# Patient Record
Sex: FEMALE | Race: WHITE | NOT HISPANIC OR LATINO | Employment: FULL TIME | URBAN - METROPOLITAN AREA
[De-identification: names, ages, dates, MRNs, and addresses within clinical notes are randomized per-mention and may not be internally consistent; named-entity substitution may affect disease eponyms.]

---

## 2017-02-14 ENCOUNTER — HOSPITAL ENCOUNTER (EMERGENCY)
Facility: CLINIC | Age: 37
Discharge: HOME | End: 2017-02-14
Attending: EMERGENCY MEDICINE | Admitting: EMERGENCY MEDICINE
Payer: COMMERCIAL

## 2017-02-14 ENCOUNTER — APPOINTMENT (OUTPATIENT)
Dept: GENERAL RADIOLOGY | Facility: CLINIC | Age: 37
End: 2017-02-14
Attending: EMERGENCY MEDICINE
Payer: COMMERCIAL

## 2017-02-14 VITALS
TEMPERATURE: 98.6 F | HEART RATE: 100 BPM | OXYGEN SATURATION: 97 % | DIASTOLIC BLOOD PRESSURE: 65 MMHG | RESPIRATION RATE: 18 BRPM | WEIGHT: 200 LBS | BODY MASS INDEX: 32.28 KG/M2 | SYSTOLIC BLOOD PRESSURE: 99 MMHG

## 2017-02-14 DIAGNOSIS — J11.1 INFLUENZA-LIKE ILLNESS: ICD-10-CM

## 2017-02-14 DIAGNOSIS — R05.9 COUGH: ICD-10-CM

## 2017-02-14 LAB
ALBUMIN SERPL-MCNC: 3.9 G/DL (ref 3.4–5)
ALBUMIN UR-MCNC: 30 MG/DL
ALP SERPL-CCNC: 91 U/L (ref 40–150)
ALT SERPL W P-5'-P-CCNC: 30 U/L (ref 0–50)
ANION GAP SERPL CALCULATED.3IONS-SCNC: 8 MMOL/L (ref 3–14)
APPEARANCE UR: ABNORMAL
AST SERPL W P-5'-P-CCNC: 30 U/L (ref 0–45)
BASOPHILS # BLD AUTO: 0 10E9/L (ref 0–0.2)
BASOPHILS NFR BLD AUTO: 0.1 %
BILIRUB SERPL-MCNC: 0.8 MG/DL (ref 0.2–1.3)
BILIRUB UR QL STRIP: NEGATIVE
BUN SERPL-MCNC: 19 MG/DL (ref 7–30)
CALCIUM SERPL-MCNC: 8.6 MG/DL (ref 8.5–10.1)
CHLORIDE SERPL-SCNC: 104 MMOL/L (ref 94–109)
CO2 SERPL-SCNC: 28 MMOL/L (ref 20–32)
COLOR UR AUTO: YELLOW
CREAT SERPL-MCNC: 1.21 MG/DL (ref 0.52–1.04)
DIFFERENTIAL METHOD BLD: ABNORMAL
EOSINOPHIL # BLD AUTO: 0.1 10E9/L (ref 0–0.7)
EOSINOPHIL NFR BLD AUTO: 0.7 %
ERYTHROCYTE [DISTWIDTH] IN BLOOD BY AUTOMATED COUNT: 14.4 % (ref 10–15)
FLUAV+FLUBV AG SPEC QL: NEGATIVE
FLUAV+FLUBV AG SPEC QL: NORMAL
GFR SERPL CREATININE-BSD FRML MDRD: 50 ML/MIN/1.7M2
GLUCOSE SERPL-MCNC: 115 MG/DL (ref 70–99)
GLUCOSE UR STRIP-MCNC: NEGATIVE MG/DL
HCG SERPL QL: NEGATIVE
HCT VFR BLD AUTO: 34.6 % (ref 35–47)
HGB BLD-MCNC: 11.4 G/DL (ref 11.7–15.7)
HGB UR QL STRIP: NEGATIVE
IMM GRANULOCYTES # BLD: 0 10E9/L (ref 0–0.4)
IMM GRANULOCYTES NFR BLD: 0.5 %
KETONES UR STRIP-MCNC: NEGATIVE MG/DL
LEUKOCYTE ESTERASE UR QL STRIP: ABNORMAL
LIPASE SERPL-CCNC: 220 U/L (ref 73–393)
LYMPHOCYTES # BLD AUTO: 0.3 10E9/L (ref 0.8–5.3)
LYMPHOCYTES NFR BLD AUTO: 4.3 %
MCH RBC QN AUTO: 30.2 PG (ref 26.5–33)
MCHC RBC AUTO-ENTMCNC: 32.9 G/DL (ref 31.5–36.5)
MCV RBC AUTO: 92 FL (ref 78–100)
MONOCYTES # BLD AUTO: 0.4 10E9/L (ref 0–1.3)
MONOCYTES NFR BLD AUTO: 4.7 %
MUCOUS THREADS #/AREA URNS LPF: PRESENT /LPF
NEUTROPHILS # BLD AUTO: 6.7 10E9/L (ref 1.6–8.3)
NEUTROPHILS NFR BLD AUTO: 89.7 %
NITRATE UR QL: NEGATIVE
NRBC # BLD AUTO: 0 10*3/UL
NRBC BLD AUTO-RTO: 0 /100
PH UR STRIP: 6 PH (ref 5–7)
PLATELET # BLD AUTO: 154 10E9/L (ref 150–450)
POTASSIUM SERPL-SCNC: 3.8 MMOL/L (ref 3.4–5.3)
PROT SERPL-MCNC: 7.7 G/DL (ref 6.8–8.8)
RBC # BLD AUTO: 3.77 10E12/L (ref 3.8–5.2)
RBC #/AREA URNS AUTO: 1 /HPF (ref 0–2)
SODIUM SERPL-SCNC: 140 MMOL/L (ref 133–144)
SP GR UR STRIP: 1.02 (ref 1–1.03)
SPECIMEN SOURCE: NORMAL
SQUAMOUS #/AREA URNS AUTO: 13 /HPF (ref 0–1)
TRANS CELLS #/AREA URNS HPF: <1 /HPF (ref 0–1)
URN SPEC COLLECT METH UR: ABNORMAL
UROBILINOGEN UR STRIP-MCNC: NORMAL MG/DL (ref 0–2)
WBC # BLD AUTO: 7.4 10E9/L (ref 4–11)
WBC #/AREA URNS AUTO: 6 /HPF (ref 0–2)

## 2017-02-14 PROCEDURE — 71020 XR CHEST 2 VW: CPT

## 2017-02-14 PROCEDURE — 83690 ASSAY OF LIPASE: CPT | Performed by: EMERGENCY MEDICINE

## 2017-02-14 PROCEDURE — 96361 HYDRATE IV INFUSION ADD-ON: CPT

## 2017-02-14 PROCEDURE — 25000128 H RX IP 250 OP 636: Performed by: EMERGENCY MEDICINE

## 2017-02-14 PROCEDURE — 80053 COMPREHEN METABOLIC PANEL: CPT | Performed by: EMERGENCY MEDICINE

## 2017-02-14 PROCEDURE — 25000125 ZZHC RX 250: Performed by: EMERGENCY MEDICINE

## 2017-02-14 PROCEDURE — 87804 INFLUENZA ASSAY W/OPTIC: CPT | Performed by: EMERGENCY MEDICINE

## 2017-02-14 PROCEDURE — 25000132 ZZH RX MED GY IP 250 OP 250 PS 637: Performed by: EMERGENCY MEDICINE

## 2017-02-14 PROCEDURE — 99284 EMERGENCY DEPT VISIT MOD MDM: CPT | Mod: 25

## 2017-02-14 PROCEDURE — 96360 HYDRATION IV INFUSION INIT: CPT

## 2017-02-14 PROCEDURE — 84703 CHORIONIC GONADOTROPIN ASSAY: CPT | Performed by: EMERGENCY MEDICINE

## 2017-02-14 PROCEDURE — 81001 URINALYSIS AUTO W/SCOPE: CPT | Performed by: EMERGENCY MEDICINE

## 2017-02-14 PROCEDURE — 85025 COMPLETE CBC W/AUTO DIFF WBC: CPT | Performed by: EMERGENCY MEDICINE

## 2017-02-14 RX ORDER — ONDANSETRON 4 MG/1
4 TABLET, ORALLY DISINTEGRATING ORAL ONCE
Status: COMPLETED | OUTPATIENT
Start: 2017-02-14 | End: 2017-02-14

## 2017-02-14 RX ORDER — ACETAMINOPHEN 500 MG
1000 TABLET ORAL ONCE
Status: COMPLETED | OUTPATIENT
Start: 2017-02-14 | End: 2017-02-14

## 2017-02-14 RX ORDER — IBUPROFEN 600 MG/1
600 TABLET, FILM COATED ORAL ONCE
Status: COMPLETED | OUTPATIENT
Start: 2017-02-14 | End: 2017-02-14

## 2017-02-14 RX ORDER — ONDANSETRON 2 MG/ML
4 INJECTION INTRAMUSCULAR; INTRAVENOUS ONCE
Status: DISCONTINUED | OUTPATIENT
Start: 2017-02-14 | End: 2017-02-14 | Stop reason: HOSPADM

## 2017-02-14 RX ADMIN — SODIUM CHLORIDE 1000 ML: 9 INJECTION, SOLUTION INTRAVENOUS at 15:11

## 2017-02-14 RX ADMIN — IBUPROFEN 600 MG: 600 TABLET ORAL at 14:23

## 2017-02-14 RX ADMIN — ONDANSETRON 4 MG: 4 TABLET, ORALLY DISINTEGRATING ORAL at 14:27

## 2017-02-14 RX ADMIN — ACETAMINOPHEN 1000 MG: 500 TABLET, FILM COATED ORAL at 14:23

## 2017-02-14 ASSESSMENT — ENCOUNTER SYMPTOMS
FEVER: 1
FATIGUE: 1
CONSTIPATION: 0
CHILLS: 1
DIARRHEA: 0
MYALGIAS: 1
VOMITING: 1
ABDOMINAL PAIN: 1
BACK PAIN: 0
NAUSEA: 1

## 2017-02-14 NOTE — ED AVS SNAPSHOT
[facility] Emergency Department    [address]    [facility] [address]    Phone:  [phone]    Fax:  [phone]                                       Enoch Barrios   MRN: [id number]    Department:  [facility] Emergency Department   Date of Visit:  2/14/2017           After Visit Summary Signature Page     I have received my discharge instructions, and my questions have been answered. I have discussed any challenges I see with this plan with the nurse or doctor.    ..........................................................................................................................................  Patient/Patient Representative Signature      ..........................................................................................................................................  Patient Representative Print Name and Relationship to Patient    ..................................................               ................................................  Date                                            Time    ..........................................................................................................................................  Reviewed by Signature/Title    ...................................................              ..............................................  Date                                                            Time

## 2017-02-14 NOTE — DISCHARGE INSTRUCTIONS
Discharge Instructions  Bronchitis, Pneumonia, Bronchospasm    You were seen today for a chest infection or inflammation. If your doctor decided this was due to a bacterial infection, you may need an antibiotic. Sometimes these are caused by a virus, and then an antibiotic will not help.     Return to the Emergency Department if:    Your breathing gets much worse.    You are very weak, or feel much more ill.    You develop new symptoms, such as chest pain.    You cough up blood.    You are vomiting enough that you can t keep fluids or your medicine down.    What can I do to help myself?    Fill any prescriptions the doctor gave you and take them right away--especially antibiotics. Be sure to finish the whole antibiotic prescription.    You may be given a prescription for an inhaler, which can help loosen tight air passages.  Use this as needed, but not more often than directed. Inhalers work much better when used with a spacer.     You may be given a prescription for a steroid to reduce inflammation. Used long-term, these can have many serious side effects, but for short courses these do not happen. You may notice restlessness or increased appetite.        You may use non-prescription cough or cold medicines. Cough medicines may help, but don t make the cough go away completely.     Avoid smoke, because this can make your symptoms worse. If you smoke, this may be a good time to quit! Consider using nicotine lozenges, gum, or patches to reduce cravings.     If you have a fever, Tylenol  (acetaminophen), Motrin  (ibuprofen), or Advil  (ibuprofen) may help bring fever down and may help you feel more comfortable. Be sure to read and follow the package directions, and ask your doctor if you have questions.    Be sure to get your flu shot each year.  The pneumonia shot can help prevent pneumonia.  Probiotics: If you have been given an antibiotic, you may want to also take a probiotic pill or eat yogurt with live cultures.  "Probiotics have \"good bacteria\" to help your intestines stay healthy. Studies have shown that probiotics help prevent diarrhea and other intestine problems (including C. diff infection) when you take antibiotics. You can buy these without a prescription in the pharmacy section of the store.     If your doctor has told you to follow-up at your clinic, be sure to call right away and go to your appointment.  If there is any problem with keeping your appointment, call your doctor or return to the Emergency Department.    If you were given a prescription for medicine here today, be sure to read all of the information (including the package insert) that comes with your prescription.  This will include important information about the medicine, its side effects, and any warnings that you need to know about.  The pharmacist who fills the prescription can provide more information and answer questions you may have about the medicine.  If you have questions or concerns that the pharmacist cannot address, please call or return to the Emergency Department.     Opioid Medication Information    Pain medications are among the most commonly prescribed medicines, so we are including this information for all our patients. If you did not receive pain medication or get a prescription for pain medicine, you can ignore it.     You may have been given a prescription for an opioid (narcotic) pain medicine and/or have received a pain medicine while here in the Emergency Department. These medicines can make you drowsy or impaired. You must not drive, operate dangerous equipment, or engage in any other dangerous activities while taking these medications. If you drive while taking these medications, you could be arrested for DUI, or driving under the influence. Do not drink any alcohol while you are taking these medications.     Opioid pain medications can cause addiction. If you have a history of chemical dependency of any type, you are at a " higher risk of becoming addicted to pain medications.  Only take these prescribed medications to treat your pain when all other options have been tried. Take it for as short a time and as few doses as possible. Store your pain pills in a secure place, as they are frequently stolen and provide a dangerous opportunity for children or visitors in your house to start abusing these powerful medications. We will not replace any lost or stolen medicine.  As soon as your pain is better, you should flush all your remaining medication.     Many prescription pain medications contain Tylenol  (acetaminophen), including Vicodin , Tylenol #3 , Norco , Lortab , and Percocet .  You should not take any extra pills of Tylenol  if you are using these prescription medications or you can get very sick.  Do not ever take more than 3000 mg of acetaminophen in any 24 hour period.    All opioids tend to cause constipation. Drink plenty of water and eat foods that have a lot of fiber, such as fruits, vegetables, prune juice, apple juice and high fiber cereal.  Take a laxative if you don t move your bowels at least every other day. Miralax , Milk of Magnesia, Colace , or Senna  can be used to keep you regular.      Remember that you can always come back to the Emergency Department if you are not able to see your regular doctor in the amount of time listed above, if you get any new symptoms, or if there is anything that worries you.

## 2017-02-14 NOTE — LETTER
[facility] EMERGENCY DEPARTMENT  [address] [place] [address]  [phone]    Enoch Barrios  [address] [address] [address]  [phone] (home) none (work)    : 1980      To Whom it may concern:    Enoch Barrios was seen in our Emergency Department today, 2017.  Please excuse until fever free for 24 hours off tylenol/ibuprofen.    Sincerely,        Giovany Zavala

## 2017-02-14 NOTE — ED AVS SNAPSHOT
[facility] Emergency Department    [address] [facility] [address]    Phone:  [phone]    Fax:  [phone]                                       Enoch Barrios   MRN: [id number]    Department:  [facility] Emergency Department   Date of Visit:  2/14/2017           Patient Information     Date Of Birth          1980        Your diagnoses for this visit were:     Cough     Influenza-like illness        You were seen by Giovany Zavala MD.      Follow-up Information     Follow up with Josué Goodman MD. Schedule an appointment as soon as possible for a visit in 3 days.    Specialty:  Family Practice    Contact information:    [facility]  [address] [id number] [phone]          Follow up with [facility] Emergency Department.    Specialty:  EMERGENCY MEDICINE    Why:  If symptoms worsen    Contact information:    [address] [facility] [id number] [phone]        Discharge Instructions       Discharge Instructions  Bronchitis, Pneumonia, Bronchospasm    You were seen today for a chest infection or inflammation. If your doctor decided this was due to a bacterial infection, you may need an antibiotic. Sometimes these are caused by a virus, and then an antibiotic will not help.     Return to the Emergency Department if:    Your breathing gets much worse.    You are very weak, or feel much more ill.    You develop new symptoms, such as chest pain.    You cough up blood.    You are vomiting enough that you can t keep fluids or your medicine down.    What can I do to help myself?    Fill any prescriptions the doctor gave you and take them right away--especially antibiotics. Be sure to finish the whole antibiotic prescription.    You may be given a prescription for an inhaler, which can help loosen tight air passages.  Use this as needed, but not more often than directed. Inhalers work much  "better when used with a spacer.     You may be given a prescription for a steroid to reduce inflammation. Used long-term, these can have many serious side effects, but for short courses these do not happen. You may notice restlessness or increased appetite.        You may use non-prescription cough or cold medicines. Cough medicines may help, but don t make the cough go away completely.     Avoid smoke, because this can make your symptoms worse. If you smoke, this may be a good time to quit! Consider using nicotine lozenges, gum, or patches to reduce cravings.     If you have a fever, Tylenol  (acetaminophen), Motrin  (ibuprofen), or Advil  (ibuprofen) may help bring fever down and may help you feel more comfortable. Be sure to read and follow the package directions, and ask your doctor if you have questions.    Be sure to get your flu shot each year.  The pneumonia shot can help prevent pneumonia.  Probiotics: If you have been given an antibiotic, you may want to also take a probiotic pill or eat yogurt with live cultures. Probiotics have \"good bacteria\" to help your intestines stay healthy. Studies have shown that probiotics help prevent diarrhea and other intestine problems (including C. diff infection) when you take antibiotics. You can buy these without a prescription in the pharmacy section of the store.     If your doctor has told you to follow-up at your clinic, be sure to call right away and go to your appointment.  If there is any problem with keeping your appointment, call your doctor or return to the Emergency Department.    If you were given a prescription for medicine here today, be sure to read all of the information (including the package insert) that comes with your prescription.  This will include important information about the medicine, its side effects, and any warnings that you need to know about.  The pharmacist who fills the prescription can provide more information and answer questions you " may have about the medicine.  If you have questions or concerns that the pharmacist cannot address, please call or return to the Emergency Department.     Opioid Medication Information    Pain medications are among the most commonly prescribed medicines, so we are including this information for all our patients. If you did not receive pain medication or get a prescription for pain medicine, you can ignore it.     You may have been given a prescription for an opioid (narcotic) pain medicine and/or have received a pain medicine while here in the Emergency Department. These medicines can make you drowsy or impaired. You must not drive, operate dangerous equipment, or engage in any other dangerous activities while taking these medications. If you drive while taking these medications, you could be arrested for DUI, or driving under the influence. Do not drink any alcohol while you are taking these medications.     Opioid pain medications can cause addiction. If you have a history of chemical dependency of any type, you are at a higher risk of becoming addicted to pain medications.  Only take these prescribed medications to treat your pain when all other options have been tried. Take it for as short a time and as few doses as possible. Store your pain pills in a secure place, as they are frequently stolen and provide a dangerous opportunity for children or visitors in your house to start abusing these powerful medications. We will not replace any lost or stolen medicine.  As soon as your pain is better, you should flush all your remaining medication.     Many prescription pain medications contain Tylenol  (acetaminophen), including Vicodin , Tylenol #3 , Norco , Lortab , and Percocet .  You should not take any extra pills of Tylenol  if you are using these prescription medications or you can get very sick.  Do not ever take more than 3000 mg of acetaminophen in any 24 hour period.    All opioids tend to cause  constipation. Drink plenty of water and eat foods that have a lot of fiber, such as fruits, vegetables, prune juice, apple juice and high fiber cereal.  Take a laxative if you don t move your bowels at least every other day. Miralax , Milk of Magnesia, Colace , or Senna  can be used to keep you regular.      Remember that you can always come back to the Emergency Department if you are not able to see your regular doctor in the amount of time listed above, if you get any new symptoms, or if there is anything that worries you.          24 Hour Appointment Hotline       To make an appointment at any [facility], call [phone] ([phone]). If you don't have a family doctor or clinic, we will help you find one. [facility] clinics are conveniently located to serve the needs of you and your family.             Review of your medicines      Our records show that you are taking the medicines listed below. If these are incorrect, please call your family doctor or clinic.        Dose / Directions Last dose taken    metoprolol 50 MG tablet   Commonly known as:  LOPRESSOR   Dose:  50 mg        Take 50 mg by mouth 2 times daily   Refills:  0        ondansetron 4 MG ODT tab   Commonly known as:  ZOFRAN-ODT   Dose:  4 mg   Quantity:  10 tablet        Take 1 tablet (4 mg) by mouth every 6 hours as needed for nausea   Refills:  0        oxyCODONE 5 MG IR tablet   Commonly known as:  ROXICODONE   Dose:  5-10 mg   Quantity:  10 tablet        Take 1-2 tablets (5-10 mg) by mouth every 4 hours as needed for moderate to severe pain   Refills:  0        senna-docusate 8.6-50 MG per tablet   Commonly known as:  SENOKOT-S;PERICOLACE   Dose:  1-2 tablet   Quantity:  30 tablet        Take 1-2 tablets by mouth 2 times daily   Refills:  0        SYNTHROID PO   Dose:  250 mcg   Indication:  Underactive Thyroid        Take 250 mcg by mouth daily   Refills:  0                Procedures and tests performed during your visit     CBC with  platelets differential    Chest XR,  PA & LAT    Comprehensive metabolic panel    HCG QUALitative    Influenza A/B antigen    Lipase    UA with Microscopic      Orders Needing Specimen Collection     None      Pending Results     Date and Time Order Name Status Description    2/14/2017 1601 Chest XR,  PA & LAT Preliminary     2/14/2017 1429 UA with Microscopic In process             Pending Culture Results     Date and Time Order Name Status Description    2/14/2017 1429 UA with Microscopic In process              Test Results from your hospital stay     2/14/2017  1:40 PM - Interface, Flexilab Results      Component Results     Component Value Ref Range & Units Status    Influenza A/B Agn Specimen Nasal  Final    Influenza A Negative NEG Final    Influenza B  NEG Final    Negative   Test results must be correlated with clinical data. If necessary, results   should be confirmed by a molecular assay or viral culture.           2/14/2017  3:08 PM - Interface, Flexilab Results      Component Results     Component Value Ref Range & Units Status    WBC 7.4 4.0 - 11.0 10e9/L Final    RBC Count 3.77 (L) 3.8 - 5.2 10e12/L Final    Hemoglobin 11.4 (L) 11.7 - 15.7 g/dL Final    Hematocrit 34.6 (L) 35.0 - 47.0 % Final    MCV 92 78 - 100 fl Final    MCH 30.2 26.5 - 33.0 pg Final    MCHC 32.9 31.5 - 36.5 g/dL Final    RDW 14.4 10.0 - 15.0 % Final    Platelet Count 154 150 - 450 10e9/L Final    Diff Method Automated Method  Final    % Neutrophils 89.7 % Final    % Lymphocytes 4.3 % Final    % Monocytes 4.7 % Final    % Eosinophils 0.7 % Final    % Basophils 0.1 % Final    % Immature Granulocytes 0.5 % Final    Nucleated RBCs 0 0 /100 Final    Absolute Neutrophil 6.7 1.6 - 8.3 10e9/L Final    Absolute Lymphocytes 0.3 (L) 0.8 - 5.3 10e9/L Final    Absolute Monocytes 0.4 0.0 - 1.3 10e9/L Final    Absolute Eosinophils 0.1 0.0 - 0.7 10e9/L Final    Absolute Basophils 0.0 0.0 - 0.2 10e9/L Final    Abs Immature Granulocytes 0.0 0 - 0.4  10e9/L Final    Absolute Nucleated RBC 0.0  Final         2/14/2017  3:27 PM - Interface, Flexilab Results      Component Results     Component Value Ref Range & Units Status    Sodium 140 133 - 144 mmol/L Final    Potassium 3.8 3.4 - 5.3 mmol/L Final    Chloride 104 94 - 109 mmol/L Final    Carbon Dioxide 28 20 - 32 mmol/L Final    Anion Gap 8 3 - 14 mmol/L Final    Glucose 115 (H) 70 - 99 mg/dL Final    Urea Nitrogen 19 7 - 30 mg/dL Final    Creatinine 1.21 (H) 0.52 - 1.04 mg/dL Final    GFR Estimate 50 (L) >60 mL/min/1.7m2 Final    Non  GFR Calc    GFR Estimate If Black 60 (L) >60 mL/min/1.7m2 Final    African American GFR Calc    Calcium 8.6 8.5 - 10.1 mg/dL Final    Bilirubin Total 0.8 0.2 - 1.3 mg/dL Final    Albumin 3.9 3.4 - 5.0 g/dL Final    Protein Total 7.7 6.8 - 8.8 g/dL Final    Alkaline Phosphatase 91 40 - 150 U/L Final    ALT 30 0 - 50 U/L Final    AST 30 0 - 45 U/L Final         2/14/2017  3:27 PM - Interface, Flexilab Results      Component Results     Component Value Ref Range & Units Status    Lipase 220 73 - 393 U/L Final         2/14/2017  3:29 PM - Interface, Flexilab Results      Component Results     Component Value Ref Range & Units Status    HCG Qualitative Serum Negative NEG Final         2/14/2017  5:58 PM - Interface, Flexilab Results         2/14/2017  4:58 PM - Interface, Radiant Ib      Narrative     CHEST TWO VIEWS    2/14/2017 4:56 PM     HISTORY: Cough    COMPARISON: None.        Impression     IMPRESSION: Normal.                Clinical Quality Measure: Blood Pressure Screening     Your blood pressure was checked while you were in the emergency department today. The last reading we obtained was  BP: 99/65 . Please read the guidelines below about what these numbers mean and what you should do about them.  If your systolic blood pressure (the top number) is less than 120 and your diastolic blood pressure (the bottom number) is less than 80, then your blood pressure  "is normal. There is nothing more that you need to do about it.  If your systolic blood pressure (the top number) is 120-139 or your diastolic blood pressure (the bottom number) is 80-89, your blood pressure may be higher than it should be. You should have your blood pressure rechecked within a year by a primary care provider.  If your systolic blood pressure (the top number) is 140 or greater or your diastolic blood pressure (the bottom number) is 90 or greater, you may have high blood pressure. High blood pressure is treatable, but if left untreated over time it can put you at risk for heart attack, stroke, or kidney failure. You should have your blood pressure rechecked by a primary care provider within the next 4 weeks.  If your provider in the emergency department today gave you specific instructions to follow-up with your doctor or provider even sooner than that, you should follow that instruction and not wait for up to 4 weeks for your follow-up visit.        Thank you for choosing [facility]       Thank you for choosing [facility] for your care. Our goal is always to provide you with excellent care. Hearing back from our patients is one way we can continue to improve our services. Please take a few minutes to complete the written survey that you may receive in the mail after you visit with us. Thank you!        [employer] Information     [employer] lets you send messages to your doctor, view your test results, renew your prescriptions, schedule appointments and more. To sign up, go to [email or web address] . Click on \"Log in\" on the left side of the screen, which will take you to the Welcome page. Then click on \"Sign up Now\" on the right side of the page.     You will be asked to enter the access code listed below, as well as some personal information. Please follow the directions to create your username and password.     Your access code is: [email or web address]  Expires: 5/15/2017  5:47 PM     Your access code will  " in 90 days. If you need help or a new code, please call your [facility] clinic or [phone].        Care EveryWhere ID     This is your Care EveryWhere ID. This could be used by other organizations to access your [facility] medical records  [id number]        After Visit Summary       This is your record. Keep this with you and show to your community pharmacist(s) and doctor(s) at your next visit.

## 2017-02-14 NOTE — ED NOTES
Pt woke up at 4am with diffuse abdominal pain and vomiting. C/o body aches. ABC's intact, alert and oriented X3. Fever also, took Tylenol this morning, c/o cough also.

## 2017-05-10 ENCOUNTER — APPOINTMENT (OUTPATIENT)
Dept: CT IMAGING | Facility: CLINIC | Age: 37
End: 2017-05-10
Attending: EMERGENCY MEDICINE

## 2017-05-10 ENCOUNTER — HOSPITAL ENCOUNTER (EMERGENCY)
Facility: CLINIC | Age: 37
Discharge: HOME | End: 2017-05-10
Attending: EMERGENCY MEDICINE | Admitting: EMERGENCY MEDICINE

## 2017-05-10 VITALS
OXYGEN SATURATION: 96 % | HEART RATE: 86 BPM | TEMPERATURE: 98.2 F | SYSTOLIC BLOOD PRESSURE: 139 MMHG | DIASTOLIC BLOOD PRESSURE: 101 MMHG | RESPIRATION RATE: 16 BRPM

## 2017-05-10 DIAGNOSIS — S00.83XA FOREHEAD CONTUSION, INITIAL ENCOUNTER: ICD-10-CM

## 2017-05-10 DIAGNOSIS — S00.81XA FOREHEAD ABRASION, INITIAL ENCOUNTER: ICD-10-CM

## 2017-05-10 PROCEDURE — 70450 CT HEAD/BRAIN W/O DYE: CPT

## 2017-05-10 PROCEDURE — 25000132 ZZH RX MED GY IP 250 OP 250 PS 637: Performed by: EMERGENCY MEDICINE

## 2017-05-10 PROCEDURE — 99284 EMERGENCY DEPT VISIT MOD MDM: CPT | Mod: 25

## 2017-05-10 RX ORDER — IBUPROFEN 200 MG
600 TABLET ORAL EVERY 6 HOURS PRN
Qty: 60 TABLET | Refills: 0 | Status: ON HOLD | OUTPATIENT
Start: 2017-05-10 | End: 2019-12-02

## 2017-05-10 RX ORDER — HYDROCODONE BITARTRATE AND ACETAMINOPHEN 5; 325 MG/1; MG/1
1 TABLET ORAL ONCE
Status: COMPLETED | OUTPATIENT
Start: 2017-05-10 | End: 2017-05-10

## 2017-05-10 RX ADMIN — HYDROCODONE BITARTRATE AND ACETAMINOPHEN 1 TABLET: 5; 325 TABLET ORAL at 13:40

## 2017-05-10 ASSESSMENT — ENCOUNTER SYMPTOMS
WOUND: 1
HEADACHES: 0
DIZZINESS: 0

## 2017-05-10 NOTE — ED NOTES
A&Ox3, ABC's intact  Pt states she was sleeping and she awoke to her closet door falling on her, cut to forehead, denies LOC, also c/o headache. Hematoma to forehead, small lac doesn't appear too deep and bleeding controlled.  PMH: See hx  Meds: Epic up to date per pt

## 2017-05-10 NOTE — ED AVS SNAPSHOT
[facility] Emergency Department    [address]    [facility] [address]    Phone:  [phone]    Fax:  [phone]                                       Enoch Bariros   MRN: [id number]    Department:  [facility] Emergency Department   Date of Visit:  5/10/2017           After Visit Summary Signature Page     I have received my discharge instructions, and my questions have been answered. I have discussed any challenges I see with this plan with the nurse or doctor.    ..........................................................................................................................................  Patient/Patient Representative Signature      ..........................................................................................................................................  Patient Representative Print Name and Relationship to Patient    ..................................................               ................................................  Date                                            Time    ..........................................................................................................................................  Reviewed by Signature/Title    ...................................................              ..............................................  Date                                                            Time

## 2017-05-10 NOTE — LETTER
[facility] EMERGENCY DEPARTMENT  [address] [place] [address]  [phone]    May 10, 2017    Enoch Barrios  [address] [address]  [phone] (home) none (work)    : 1980      To Whom it may concern:    Enoch Barrios was seen in our Emergency Department today, May 10, 2017.  I expect her condition to improve over the next 1 days.  She may return to work/school when improved.    Sincerely,        Ethel Patel

## 2017-05-10 NOTE — ED AVS SNAPSHOT
[facility] Emergency Department    [address] [facility] [address]    Phone:  [phone]    Fax:  [phone]                                       Enoch Barrios   MRN: [id number]    Department:  [facility] Emergency Department   Date of Visit:  5/10/2017           Patient Information     Date Of Birth          1980        Your diagnoses for this visit were:     Forehead abrasion, initial encounter     Forehead contusion, initial encounter        You were seen by Valentin Maradiaga DO.      Follow-up Information     Follow up with Josué Goodman MD. Call in 2 days.    Specialty:  Family Practice    Why:  As needed    Contact information:    [facility]  [address] [facility] [id number] [phone]          Follow up with [facility] Emergency Department.    Specialty:  EMERGENCY MEDICINE    Why:  If symptoms worsen    Contact information:    [address] [facility] [id number] [phone]        Discharge Instructions         Abrasions  Abrasions are skin scrapes. Their treatment depends on how large and deep the abrasion is.  Home care   You may be prescribed an antibiotic cream or ointment to apply to the wound. This helps prevent infection. Follow instructions when using this medication.  General care    To care for the abrasion, do the following each day for as long as directed by your health care provider.    If you were given a bandage, change it once a day. If your bandage sticks to the wound, soak it in warm water until it loosens.    Wash the area with soap and warm water. You may do this in a sink or under a tub faucet or shower. Rinse off the soap. Then pat the area dry with a clean towel.    If antibiotic ointment or cream was prescribed, reapply it to the wound as directed. Cover the wound with a fresh non-stick bandage. If the bandage becomes wet or dirty, change it as soon as  possible.    You may use acetaminophen or ibuprofen to control pain unless another pain medication was prescribed. Note: If you have chronic liver or kidney disease or ever had a stomach ulcer or GI bleeding, talk with your health care provider before using these medications. Do not use ibuprofen in children under six months of age.    Most skin wounds heal within ten days. But an infection may occur despite treatment. Therefore, monitor the wound for signs of infection as listed below.  Follow-up care  Follow up with your health care provider, or as advised.  When to seek medical advice  Call your health care provider right away if any of these occur:    Fever of 101 F (38.3 C) or higher, or as directed by your health care provider    Increasing pain, redness, swelling, or drainage from the wound    Bleeding from the wound that does not stop after a few minutes of steady, firm pressure    Decreased ability to move any body part near wound    [id number] [employer]. [address] [place] [address]. All rights reserved. This information is not intended as a substitute for professional medical care. Always follow your healthcare professional's instructions.         * HEAD INJURY, no wake-up (Adult)    You have had a head injury. It does not appear serious at this time. Sometimes symptoms of a more serious problem (concussion, bruising or bleeding in the brain) may appear later. Therefore, watch for the warning signs listed below.  HOME CARE:    During the next 24 hours someone must stay with you to check for the signs below. It is not necessary to stay awake or be awakened during the night.    If you have swelling of the face or scalp, apply an ice pack (ice cubes in a plastic bag, wrapped in a towel) for 20 minutes. Do this every 1-2 hours until the swelling starts to go down.    You may use acetaminophen (Tylenol) 650-1000 mg every 6 hours or ibuprofen (Motrin, Advil) 600 mg every 6-8 hours  with food to control pain, if you are able to take these medicines. [NOTE: If you have chronic liver or kidney disease or ever had a stomach ulcer or GI bleeding, talk with your doctor before using these medicines.] Do not take aspirin after a head injury.    For the next 24 hours:    Do not take alcohol, sedatives or medicines that make you sleepy.    Do not drive or operate machinery.    Avoid strenuous activities. No lifting or straining.    If you have had any symptoms of a concussion today (nausea, vomiting, dizziness, confusion, headache, memory loss or if you were knocked out), do not return to sports or any activity that could result in another head injury until 2 full weeks after all symptoms are gone and you have been cleared by your doctor. A second head injury before fully recovering from the first one can lead to serious brain injury.  FOLLOW UP with your doctor if symptoms are not improving after 24 hours, or as directed.  GET PROMPT MEDICAL ATTENTION if any of the following warning signs occur:    Repeated vomiting    Severe or worsening headache or dizziness    Unusual drowsiness, or unable to awaken as usual    Confusion or change in behavior or speech, memory loss, blurred vision    Convulsion (seizure)    Increasing scalp or face swelling    Redness, warmth or pus from the swollen area    Fluid drainage or bleeding from the nose or ears    [phone] [address], [place] [address]. All rights reserved. This information is not intended as a substitute for professional medical care. Always follow your healthcare professional's instructions.      24 Hour Appointment Hotline       To make an appointment at any [facility], call [phone] ([phone]). If you don't have a family doctor or clinic, we will help you find one. [facility] [place] clinics are conveniently located to serve the needs of you and your family.             Review of your medicines      Our records  show that you are taking the medicines listed below. If these are incorrect, please call your family doctor or clinic.        Dose / Directions Last dose taken    metoprolol 50 MG tablet   Commonly known as:  LOPRESSOR   Dose:  50 mg        Take 50 mg by mouth 2 times daily   Refills:  0        ondansetron 4 MG ODT tab   Commonly known as:  ZOFRAN-ODT   Dose:  4 mg   Quantity:  10 tablet        Take 1 tablet (4 mg) by mouth every 6 hours as needed for nausea   Refills:  0        oxyCODONE 5 MG IR tablet   Commonly known as:  ROXICODONE   Dose:  5-10 mg   Quantity:  10 tablet        Take 1-2 tablets (5-10 mg) by mouth every 4 hours as needed for moderate to severe pain   Refills:  0        senna-docusate 8.6-50 MG per tablet   Commonly known as:  SENOKOT-S;PERICOLACE   Dose:  1-2 tablet   Quantity:  30 tablet        Take 1-2 tablets by mouth 2 times daily   Refills:  0        SYNTHROID PO   Dose:  250 mcg   Indication:  Underactive Thyroid        Take 250 mcg by mouth daily   Refills:  0                Procedures and tests performed during your visit     Head CT w/o contrast      Orders Needing Specimen Collection     None      Pending Results     No orders found from 5/8/2017 to 5/11/2017.            Pending Culture Results     No orders found from 5/8/2017 to 5/11/2017.            Pending Results Instructions     If you had any lab results that were not finalized at the time of your Discharge, you can call the ED Lab Result RN at [phone]. You will be contacted by this team for any positive Lab results or changes in treatment. The nurses are available 7 days a week from 10A to 6:30P.  You can leave a message 24 hours per day and they will return your call.        Test Results From Your Hospital Stay        5/10/2017  3:10 PM      Narrative     CT SCAN OF THE HEAD WITHOUT CONTRAST   5/10/2017 2:38 PM     HISTORY: Right forehead injury.    TECHNIQUE:  Axial images of the head and coronal reformations without  IV  contrast material.  Radiation dose for this scan was reduced using  automated exposure control, adjustment of the mA and/or kV according  to patient size, or iterative reconstruction technique.    COMPARISON: None.    FINDINGS:  The ventricles are normal in size, shape and configuration.   The brain parenchyma and subarachnoid spaces are normal. There is no  evidence of intracranial hemorrhage, mass, acute infarct or anomaly.     The visualized portions of the sinuses and mastoids appear normal.  There is no evidence of trauma.        Impression     IMPRESSION:  Normal CT scan of the head.    AVNI CUI MD                Clinical Quality Measure: Blood Pressure Screening     Your blood pressure was checked while you were in the emergency department today. The last reading we obtained was  BP: (!) 139/101 . Please read the guidelines below about what these numbers mean and what you should do about them.  If your systolic blood pressure (the top number) is less than 120 and your diastolic blood pressure (the bottom number) is less than 80, then your blood pressure is normal. There is nothing more that you need to do about it.  If your systolic blood pressure (the top number) is 120-139 or your diastolic blood pressure (the bottom number) is 80-89, your blood pressure may be higher than it should be. You should have your blood pressure rechecked within a year by a primary care provider.  If your systolic blood pressure (the top number) is 140 or greater or your diastolic blood pressure (the bottom number) is 90 or greater, you may have high blood pressure. High blood pressure is treatable, but if left untreated over time it can put you at risk for heart attack, stroke, or kidney failure. You should have your blood pressure rechecked by a primary care provider within the next 4 weeks.  If your provider in the emergency department today gave you specific instructions to follow-up with your doctor or provider even  "sooner than that, you should follow that instruction and not wait for up to 4 weeks for your follow-up visit.        Thank you for choosing [facility]       Thank you for choosing [facility] for your care. Our goal is always to provide you with excellent care. Hearing back from our patients is one way we can continue to improve our services. Please take a few minutes to complete the written survey that you may receive in the mail after you visit with us. Thank you!        [employer] Information     [employer] lets you send messages to your doctor, view your test results, renew your prescriptions, schedule appointments and more. To sign up, go to [email or web address] . Click on \"Log in\" on the left side of the screen, which will take you to the Welcome page. Then click on \"Sign up Now\" on the right side of the page.     You will be asked to enter the access code listed below, as well as some personal information. Please follow the directions to create your username and password.     Your access code is: [email or web address]  Expires: 5/15/2017  6:47 PM     Your access code will  in 90 days. If you need help or a new code, please call your [facility] clinic or [phone].        Care EveryWhere ID     This is your Care EveryWhere ID. This could be used by other organizations to access your [facility] medical records  [id number]        After Visit Summary       This is your record. Keep this with you and show to your community pharmacist(s) and doctor(s) at your next visit.                  "

## 2017-05-10 NOTE — ED PROVIDER NOTES
History     Chief Complaint:  Head Injury    The history is provided by the patient.      Enoch Barrios is a 36 year old female who presents with a head injury. The patient states that she was sleeping this morning around 0920 when her daughter, whom was playing with a standing closet door, pulled the door down. The door hit Enoch's forehead resulting in a laceration across her forehead. At this time, she denies headaches, syncope and dizziness. She has not taken any medications since.     Allergies:  The patient has no known drug allergies.    Medications:    Lopressor  Synthroid   Oxycodone  Zofran ODT  Senna-docusate    Past Medical History:    CKD  HTN  Thyroid disease  Ureterolithiasis  Hypothyroidism     Past Surgical History:    Cystoscopy, retrograde, stent insertion in ureter combined  D & C combined    Family History:    History reviewed.  No significant family history.     Social History:  Relationship status: single  Tobacco use: neg  Alcohol use: neg  The patient presents with her daughter.      Review of Systems   Skin: Positive for wound.   Neurological: Negative for dizziness, syncope and headaches.   All other systems reviewed and are negative.    Physical Exam   First Vitals:  BP: (!) 164/107  Pulse: 86  Temp: 98.2  F (36.8  C)  Resp: 16  SpO2: 99 %      Physical Exam  Constitutional: Patient appears well-developed and well-nourished.   HENT:   Head: There is a linear contusion/abrasion on the right forehead.   Eyes: Pupils are equal, round, and reactive to light. No papilledema  Cardiovascular: Normal rate, regular rhythm, normal heart sounds and intact distal pulses.    Pulmonary/Chest: Effort normal and breath sounds normal. No respiratory distress. No wheezes noted.   Abdominal: Soft. There is no tenderness. There is no rebound.   Neurological:    Patient is alert and oriented to person, place, and time.    Speech is fluent, cognition is normal.   CN 2-12 intact (PERRL, EOMI, symmetric  smile, equal eye squeeze and forehead raise, normal and equal sensation to bilateral forehead/cheek/chin, equal hearing to finger rub, midline tongue protrusion with nl side-to-side movement, normal shoulder shrug).    RUE strength 5/5: , finger abd, wrist flex/ext, elbow flex/ext.    LUE strength 5/5: , finger abd, wrist flex/ext, elbow flex/ext.    RLE strength 5/5: ankle flex/ext, knee flex/ext, hip flex.    LLE strength 5/5: ankle flex/ext, knee flex/ext, hip flex.    Sensation equal in all 4 extremities.    No arm drift.     Cerebellar: Normal rapid alternating movements     ( finger-nose-finger, rapid pronation/supination, hand rolling)   Normal gait.   Skin: Skin is warm and dry. Contusion and abrasion on right forehead. No laceration noted.    Emergency Department Course     Imaging:  Radiographic findings were communicated with the patient and family who voiced understanding of the findings.  Head CT, without contrast, per radiology:   Normal CT scan of the head.     Interventions:  1340: Norco, 1 tablet, PO    Emergency Department Course:  Nursing notes and vitals reviewed.  I performed an exam of the patient as documented above.  The above workup was undertaken.  1520: I rechecked the patient and discussed results.  Findings and plan explained to the Patient. Patient discharged home with instructions regarding supportive care, medications, and reasons to return. The importance of close follow-up was reviewed.    Impression & Plan      Medical Decision Making:  Enoch Barrios is a 36 year old female who presents to the ER complaining of a head injury. Apparently a closet door fell on the patient's forehead while she was sleeping. There was an abrasion but there was no overt laceration that needed suturing. Head CT did not demonstrate any acute intracranial injury or skull fracture. At this time the patient is stable for discharge and should follow up in the outpatient setting. Anticipatory  guidance was given prior to patient discharge.     Diagnosis:    ICD-10-CM   1. Forehead abrasion, initial encounter S00.81XA   2. Forehead contusion, initial encounter S00.83XA     Disposition:  Discharge to home with primary care follow up.    I, Geoff Reyes, am serving as a scribe on 5/10/2017 at 1:31 PM to personally document services performed by Valentin Maradiaga DO, based on my observations and the provider's statements to me.    [facility] EMERGENCY DEPARTMENT       Valentin Maradiaga DO  05/10/17 1927

## 2017-06-03 ENCOUNTER — HEALTH MAINTENANCE LETTER (OUTPATIENT)
Age: 37
End: 2017-06-03

## 2017-10-22 ENCOUNTER — HOSPITAL ENCOUNTER (EMERGENCY)
Facility: CLINIC | Age: 37
Discharge: HOME | End: 2017-10-23
Attending: EMERGENCY MEDICINE | Admitting: EMERGENCY MEDICINE

## 2017-10-22 ENCOUNTER — APPOINTMENT (OUTPATIENT)
Dept: CT IMAGING | Facility: CLINIC | Age: 37
End: 2017-10-22
Attending: EMERGENCY MEDICINE

## 2017-10-22 VITALS
HEART RATE: 92 BPM | DIASTOLIC BLOOD PRESSURE: 101 MMHG | TEMPERATURE: 97.6 F | SYSTOLIC BLOOD PRESSURE: 137 MMHG | OXYGEN SATURATION: 98 % | RESPIRATION RATE: 18 BRPM

## 2017-10-22 DIAGNOSIS — R10.9 FLANK PAIN: ICD-10-CM

## 2017-10-22 LAB
ALBUMIN SERPL-MCNC: 3.4 G/DL (ref 3.4–5)
ALBUMIN UR-MCNC: 100 MG/DL
ALP SERPL-CCNC: 132 U/L (ref 40–150)
ALT SERPL W P-5'-P-CCNC: 88 U/L (ref 0–50)
ANION GAP SERPL CALCULATED.3IONS-SCNC: 8 MMOL/L (ref 3–14)
APPEARANCE UR: ABNORMAL
AST SERPL W P-5'-P-CCNC: 68 U/L (ref 0–45)
AST SERPL W P-5'-P-CCNC: ABNORMAL U/L (ref 0–45)
BASOPHILS # BLD AUTO: 0.1 10E9/L (ref 0–0.2)
BASOPHILS NFR BLD AUTO: 1.2 %
BILIRUB SERPL-MCNC: 0.5 MG/DL (ref 0.2–1.3)
BILIRUB UR QL STRIP: NEGATIVE
BUN SERPL-MCNC: 17 MG/DL (ref 7–30)
CALCIUM SERPL-MCNC: 7.9 MG/DL (ref 8.5–10.1)
CHLORIDE SERPL-SCNC: 102 MMOL/L (ref 94–109)
CO2 SERPL-SCNC: 25 MMOL/L (ref 20–32)
COLOR UR AUTO: YELLOW
CREAT SERPL-MCNC: 1.38 MG/DL (ref 0.52–1.04)
DIFFERENTIAL METHOD BLD: ABNORMAL
EOSINOPHIL # BLD AUTO: 0.8 10E9/L (ref 0–0.7)
EOSINOPHIL NFR BLD AUTO: 9 %
ERYTHROCYTE [DISTWIDTH] IN BLOOD BY AUTOMATED COUNT: 14.3 % (ref 10–15)
GFR SERPL CREATININE-BSD FRML MDRD: 43 ML/MIN/1.7M2
GLUCOSE SERPL-MCNC: 118 MG/DL (ref 70–99)
GLUCOSE UR STRIP-MCNC: NEGATIVE MG/DL
HCG UR QL: NEGATIVE
HCT VFR BLD AUTO: 34.1 % (ref 35–47)
HGB BLD-MCNC: 11.6 G/DL (ref 11.7–15.7)
HGB UR QL STRIP: NEGATIVE
IMM GRANULOCYTES # BLD: 0.1 10E9/L (ref 0–0.4)
IMM GRANULOCYTES NFR BLD: 1.5 %
KETONES UR STRIP-MCNC: NEGATIVE MG/DL
LEUKOCYTE ESTERASE UR QL STRIP: ABNORMAL
LIPASE SERPL-CCNC: 203 U/L (ref 73–393)
LYMPHOCYTES # BLD AUTO: 2.5 10E9/L (ref 0.8–5.3)
LYMPHOCYTES NFR BLD AUTO: 27.6 %
MCH RBC QN AUTO: 32.6 PG (ref 26.5–33)
MCHC RBC AUTO-ENTMCNC: 34 G/DL (ref 31.5–36.5)
MCV RBC AUTO: 96 FL (ref 78–100)
MONOCYTES # BLD AUTO: 0.5 10E9/L (ref 0–1.3)
MONOCYTES NFR BLD AUTO: 4.9 %
MUCOUS THREADS #/AREA URNS LPF: PRESENT /LPF
NEUTROPHILS # BLD AUTO: 5.1 10E9/L (ref 1.6–8.3)
NEUTROPHILS NFR BLD AUTO: 55.8 %
NITRATE UR QL: NEGATIVE
NRBC # BLD AUTO: 0 10*3/UL
NRBC BLD AUTO-RTO: 0 /100
PH UR STRIP: 5 PH (ref 5–7)
PLATELET # BLD AUTO: 194 10E9/L (ref 150–450)
POTASSIUM SERPL-SCNC: 4.4 MMOL/L (ref 3.4–5.3)
PROT SERPL-MCNC: 7.1 G/DL (ref 6.8–8.8)
RBC # BLD AUTO: 3.56 10E12/L (ref 3.8–5.2)
RBC #/AREA URNS AUTO: 3 /HPF (ref 0–2)
SODIUM SERPL-SCNC: 135 MMOL/L (ref 133–144)
SOURCE: ABNORMAL
SP GR UR STRIP: 1.02 (ref 1–1.03)
SQUAMOUS #/AREA URNS AUTO: 17 /HPF (ref 0–1)
UROBILINOGEN UR STRIP-MCNC: 0 MG/DL (ref 0–2)
WBC # BLD AUTO: 9.1 10E9/L (ref 4–11)
WBC #/AREA URNS AUTO: 15 /HPF (ref 0–2)

## 2017-10-22 PROCEDURE — 25000128 H RX IP 250 OP 636: Performed by: EMERGENCY MEDICINE

## 2017-10-22 PROCEDURE — 96375 TX/PRO/DX INJ NEW DRUG ADDON: CPT

## 2017-10-22 PROCEDURE — 99285 EMERGENCY DEPT VISIT HI MDM: CPT | Mod: 25

## 2017-10-22 PROCEDURE — 83690 ASSAY OF LIPASE: CPT | Performed by: EMERGENCY MEDICINE

## 2017-10-22 PROCEDURE — 85025 COMPLETE CBC W/AUTO DIFF WBC: CPT | Performed by: EMERGENCY MEDICINE

## 2017-10-22 PROCEDURE — 80053 COMPREHEN METABOLIC PANEL: CPT | Performed by: EMERGENCY MEDICINE

## 2017-10-22 PROCEDURE — 74176 CT ABD & PELVIS W/O CONTRAST: CPT

## 2017-10-22 PROCEDURE — 81025 URINE PREGNANCY TEST: CPT | Performed by: EMERGENCY MEDICINE

## 2017-10-22 PROCEDURE — 96365 THER/PROPH/DIAG IV INF INIT: CPT

## 2017-10-22 PROCEDURE — 87086 URINE CULTURE/COLONY COUNT: CPT | Performed by: EMERGENCY MEDICINE

## 2017-10-22 PROCEDURE — 96376 TX/PRO/DX INJ SAME DRUG ADON: CPT

## 2017-10-22 PROCEDURE — 81001 URINALYSIS AUTO W/SCOPE: CPT | Performed by: EMERGENCY MEDICINE

## 2017-10-22 RX ORDER — ONDANSETRON 2 MG/ML
4 INJECTION INTRAMUSCULAR; INTRAVENOUS EVERY 30 MIN PRN
Status: DISCONTINUED | OUTPATIENT
Start: 2017-10-22 | End: 2017-10-23 | Stop reason: HOSPADM

## 2017-10-22 RX ORDER — CEFTRIAXONE 1 G/1
1 INJECTION, POWDER, FOR SOLUTION INTRAMUSCULAR; INTRAVENOUS ONCE
Status: COMPLETED | OUTPATIENT
Start: 2017-10-22 | End: 2017-10-23

## 2017-10-22 RX ORDER — TRAMADOL HYDROCHLORIDE 50 MG/1
50-100 TABLET ORAL EVERY 6 HOURS PRN
Qty: 20 TABLET | Refills: 0 | Status: SHIPPED | OUTPATIENT
Start: 2017-10-22 | End: 2019-11-29

## 2017-10-22 RX ORDER — CIPROFLOXACIN 500 MG/1
500 TABLET, FILM COATED ORAL 2 TIMES DAILY
Qty: 14 TABLET | Refills: 0 | Status: SHIPPED | OUTPATIENT
Start: 2017-10-22 | End: 2017-10-29

## 2017-10-22 RX ORDER — HYDROMORPHONE HYDROCHLORIDE 1 MG/ML
0.5 INJECTION, SOLUTION INTRAMUSCULAR; INTRAVENOUS; SUBCUTANEOUS
Status: DISCONTINUED | OUTPATIENT
Start: 2017-10-22 | End: 2017-10-23 | Stop reason: HOSPADM

## 2017-10-22 RX ADMIN — HYDROMORPHONE HYDROCHLORIDE 0.5 MG: 1 INJECTION, SOLUTION INTRAMUSCULAR; INTRAVENOUS; SUBCUTANEOUS at 21:24

## 2017-10-22 RX ADMIN — HYDROMORPHONE HYDROCHLORIDE 0.5 MG: 1 INJECTION, SOLUTION INTRAMUSCULAR; INTRAVENOUS; SUBCUTANEOUS at 23:40

## 2017-10-22 RX ADMIN — ONDANSETRON 4 MG: 2 INJECTION INTRAMUSCULAR; INTRAVENOUS at 21:24

## 2017-10-22 RX ADMIN — CEFTRIAXONE SODIUM 1 G: 1 INJECTION, POWDER, FOR SOLUTION INTRAMUSCULAR; INTRAVENOUS at 23:40

## 2017-10-22 ASSESSMENT — ENCOUNTER SYMPTOMS
DIZZINESS: 1
BACK PAIN: 1
FREQUENCY: 1
CONSTIPATION: 0
VOMITING: 0
FLANK PAIN: 1
NAUSEA: 0
CHILLS: 0
DYSURIA: 0
DIARRHEA: 0
FEVER: 0

## 2017-10-22 NOTE — ED AVS SNAPSHOT
[facility] Emergency Department    [address]    [facility] [address]    Phone:  [phone]    Fax:  [phone]                                       Enoch Barrios   MRN: [id number]    Department:  [facility] Emergency Department   Date of Visit:  10/22/2017           After Visit Summary Signature Page     I have received my discharge instructions, and my questions have been answered. I have discussed any challenges I see with this plan with the nurse or doctor.    ..........................................................................................................................................  Patient/Patient Representative Signature      ..........................................................................................................................................  Patient Representative Print Name and Relationship to Patient    ..................................................               ................................................  Date                                            Time    ..........................................................................................................................................  Reviewed by Signature/Title    ...................................................              ..............................................  Date                                                            Time

## 2017-10-22 NOTE — ED AVS SNAPSHOT
[facility]    [address] [place] [address]    Phone:  [phone]    Fax:  [phone]                                       Enoch Barriso   MRN: [id number]    Department:  [facility]   Date of Visit:  10/22/2017           Patient Information     Date Of Birth          1980        Your diagnoses for this visit were:     Flank pain        You were seen by Pablo Romano MD.      Follow-up Information     Follow up with [facility].    Specialty:  EMERGENCY MEDICINE    Why:  As needed    Contact information:    [address] [place] [address] [phone]        Follow up with [facility], [place] In 3 days.    Specialty:  Family Practice    Contact information:    [address] [place] [address]  [phone]          Discharge Instructions       Take the below medications as prescribed.  Please do not miss any doses.    New Prescriptions    CIPROFLOXACIN (CIPRO) 500 MG TABLET    Take 1 tablet (500 mg) by mouth 2 times daily for 7 days    TRAMADOL (ULTRAM) 50 MG TABLET    Take 1-2 tablets ( mg) by mouth every 6 hours as needed for pain maximum 8 tablet(s) per day     1. -Take acetaminophen 500 to 1000 mg by mouth every 4 to 6 hours as needed for pain or fever.  Do not take more than 4000 mg in 24 hours.  Do not take within 6 hours of another acetaminophen containing medication such as norco (vicodin) or percocet.  - Take ibuprofen 600 to 800 mg by mouth every 6 to 8 hours as needed for pain or fever  2. Please follow-up with a primary doctor this week.  3. Please return to the ED as needed for new or worsening symptoms such as fever greater than 100.4 F, severe and uncontrollable pain, vomiting and unable to keep anything down, any other concerning symptoms.    Discharge Instructions  Urinary Tract Infection  You or your child have been  diagnosed with a urinary tract infection, or UTI. The urinary tract includes the kidneys (which make urine/pee), ureters (the tubes that carry urine/pee from the kidneys to the bladder), the bladder (which stores urine/pee), and urethra (the tube that carries urine/pee out of the bladder). Urinary tract infections occur when bacteria travel up the urethra into the bladder (bladder infection) and, in some cases, from there into the kidneys (kidney infection).  Generally, every Emergency Department visit should have a follow-up clinic visit with either a primary or a specialty clinic/provider. Please follow-up as instructed by your emergency provider today.  Return to the Emergency Department if:    You or your child have severe back pain.    You or your child are vomiting (throwing up) so that you cannot take your medicine.    You or your child have a new fever (had not previously had a fever) over 101 F.    You or your child have confusion or are very weak, or feel very ill.    Your child seems much more ill, will not wake up, will not respond right, or is crying for a long time and will not calm down.    You or your child are showing signs of dehydration. These signs may include decreased urination (pee), dry mouth/gums/tongue, or decreased activity.    Follow-up with your provider:     Children under 24 months need to be seen by their regular provider within one week after a diagnosis of a UTI. It may be necessary to do some more tests to look at the child s kidney or bladder.    You should begin to feel better within 24 - 48 hours of starting your antibiotic; follow-up with your regular clinic/doctor/provider if this is not the case.    Treatment:     You will be treated with an antibiotic to kill the bacteria. We have to make an educated guess, based on what we know about common bacteria and antibiotics, as to which antibiotic will work for your infection. We will be correct most times but there will be some  "cases where the antibiotic chosen is not correct (see urine cultures below).    Take a pain medication such as acetaminophen (Tylenol ) or ibuprofen (Advil , Motrin , Nuprin ).    Phenazopyridine (Pyridium , Uristat ) is a prescription medication that numbs the bladder to reduce the burning pain of some UTIs.  The same medication is available in a non-prescription version (Azo-Standard , Urodol ). This medication will change the color of the urine and tears (usually blue or orange). If you wear contacts, do not wear them while taking this medication as they may be stained by the medication.    Urine Cultures:    If indicated, a urine culture may have been performed today. This test generally takes 24-48 hours to complete so the results are not known at this time. The results can confirm that an infection is present but also determine which antibiotic is effective for the specific bacteria that is causing the infection. If your urine culture shows that the antibiotic you were given today will not work to treat your infection, we will attempt to contact you to make arrangements to change the antibiotic. If the culture confirms that the antibiotic is effective for your infection, you will not be contacted. We often recommend follow-up with your regular physician/provider on the culture results regardless of this process.    Antibiotic Warning:     If you have been placed on antibiotics - watch for signs of allergic reaction.  These include rash, lip swelling, difficulty breathing, wheezing, and dizziness.  If you develop any of these symptoms, stop the antibiotic immediately and go to an emergency room or urgent care for evaluation.    Probiotics: If you have been given an antibiotic, you may want to also take a probiotic pill or eat yogurt with live cultures. Probiotics have \"good bacteria\" to help your intestines stay healthy. Studies have shown that probiotics help prevent diarrhea and other intestine problems " (including C. diff infection) when you take antibiotics. You can buy these without a prescription in the pharmacy section of the store.   If you were given a prescription for medicine here today, be sure to read all of the information (including the package insert) that comes with your prescription.  This will include important information about the medicine, its side effects, and any warnings that you need to know about.  The pharmacist who fills the prescription can provide more information and answer questions you may have about the medicine.  If you have questions or concerns that the pharmacist cannot address, please call or return to the Emergency Department.   Remember that you can always come back to the Emergency Department if you are not able to see your regular provider in the amount of time listed above, if you get any new symptoms, or if there is anything that worries you.        24 Hour Appointment Hotline       To make an appointment at any [facility], call [phone] ([phone]). If you don't have a family doctor or clinic, we will help you find one. [facility] clinics are conveniently located to serve the needs of you and your family.             Review of your medicines      START taking        Dose / Directions Last dose taken    ciprofloxacin 500 MG tablet   Commonly known as:  CIPRO   Dose:  500 mg   Quantity:  14 tablet        Take 1 tablet (500 mg) by mouth 2 times daily for 7 days   Refills:  0        traMADol 50 MG tablet   Commonly known as:  ULTRAM   Dose:   mg   Quantity:  20 tablet        Take 1-2 tablets ( mg) by mouth every 6 hours as needed for pain maximum 8 tablet(s) per day   Refills:  0          Our records show that you are taking the medicines listed below. If these are incorrect, please call your family doctor or clinic.        Dose / Directions Last dose taken    ibuprofen 200 MG tablet   Commonly known as:  ADVIL/MOTRIN   Dose:  600 mg   Quantity:  60  tablet        Take 3 tablets (600 mg) by mouth every 6 hours as needed for mild pain or fever   Refills:  0        metoprolol 50 MG tablet   Commonly known as:  LOPRESSOR   Dose:  50 mg        Take 50 mg by mouth 2 times daily   Refills:  0        SYNTHROID PO   Dose:  250 mcg   Indication:  Underactive Thyroid        Take 250 mcg by mouth daily   Refills:  0                Prescriptions were sent or printed at these locations (2 Prescriptions)                   Other Prescriptions                Printed at Department/Unit printer (2 of 2)         traMADol (ULTRAM) 50 MG tablet               ciprofloxacin (CIPRO) 500 MG tablet                Procedures and tests performed during your visit     AST    CBC with platelets differential    CT Abdomen Pelvis WITHOUT Contrast (stone protocol)    Comprehensive metabolic panel    HCG qualitative urine    Lipase    UA reflex to Microscopic    Urine Culture      Orders Needing Specimen Collection     None      Pending Results     Date and Time Order Name Status Description    10/22/2017 2002 Urine Culture Preliminary             Pending Culture Results     Date and Time Order Name Status Description    10/22/2017 2002 Urine Culture Preliminary             Pending Results Instructions     If you had any lab results that were not finalized at the time of your Discharge, you can call the ED Lab Result RN at [phone]. You will be contacted by this team for any positive Lab results or changes in treatment. The nurses are available 7 days a week from 10A to 6:30P.  You can leave a message 24 hours per day and they will return your call.        Test Results From Your Hospital Stay        10/22/2017  9:31 PM      Component Results     Component Value Ref Range & Units Status    Color Urine Yellow  Final    Appearance Urine Slightly Cloudy  Final    Glucose Urine Negative NEG^Negative mg/dL Final    Bilirubin Urine Negative NEG^Negative Final    Ketones Urine Negative NEG^Negative  mg/dL Final    Specific Gravity Urine 1.019 1.003 - 1.035 Final    Blood Urine Negative NEG^Negative Final    pH Urine 5.0 5.0 - 7.0 pH Final    Protein Albumin Urine 100 (A) NEG^Negative mg/dL Final    Urobilinogen mg/dL 0.0 0.0 - 2.0 mg/dL Final    Nitrite Urine Negative NEG^Negative Final    Leukocyte Esterase Urine Small (A) NEG^Negative Final    Source Midstream Urine  Final    RBC Urine 3 (H) 0 - 2 /HPF Final    WBC Urine 15 (H) 0 - 2 /HPF Final    Squamous Epithelial /HPF Urine 17 (H) 0 - 1 /HPF Final    Mucous Urine Present (A) NEG^Negative /LPF Final         10/22/2017 10:42 PM      Component Results     Component    Specimen Description    Midstream Urine    Special Requests    Specimen received in preservative    Culture Micro    PENDING         10/22/2017  9:16 PM      Component Results     Component Value Ref Range & Units Status    HCG Qual Urine Negative NEG^Negative Final    This test is for screening purposes.  Results should be interpreted along with   the clinical picture.  Confirmation testing is available if warranted by   ordering [id number], HCG Quantitative Pregnancy.           10/22/2017  9:40 PM      Narrative     CT ABDOMEN AND PELVIS WITHOUT CONTRAST  10/22/2017 9:17 PM    HISTORY:  Right flank pain, evaluate stone.    TECHNIQUE: Scans obtained from the diaphragm through the pelvis  without oral or IV contrast.   Radiation dose for this scan was reduced using automated exposure  control, adjustment of the mA and/or kV according to patient size, or  iterative reconstruction technique.    COMPARISON:  None.    FINDINGS:   Visualized portions of the lung bases and mediastinal contents are  unremarkable. There are no aggressive osseous lesions.    The liver, gallbladder, pancreas, spleen, bilateral adrenal glands are  normal in appearance for a noncontrast CT. Nonobstructive calculi  numbering 3 on the right and approximately 4 on the left are noted.  These measure up to 0.4 cm on the right and  0.3 cm on the left. Right  renal scarring is noted with right renal atrophy. No hydronephrosis,  hydroureter or ureteral calculus is identified. Urinary bladder is  grossly unremarkable.    The uterus is grossly unremarkable. Structures which could represent  normal ovaries are seen next to the uterus. No adnexal mass is  identified.    No adenopathy, free fluid or free air is seen in the peritoneal  cavity. Aorta is grossly normal in appearance.    The colon is grossly of normal caliber without pericolonic  inflammatory change to suggest acute diverticulitis. There are  scattered diverticula in the descending colon. The appendix is normal  in appearance. Small bowel is of normal caliber. Stomach is filled by  ingested material but is otherwise unremarkable.    There is a small fat-containing umbilical hernia.        Impression     IMPRESSION:  1. Right renal atrophy and nonobstructive right intrarenal calculi are  noted.  2. Nonobstructive left intrarenal calculi with mild left renal  scarring.  3. No evidence for obstructive urinary system process.  4. Fat-containing small umbilical hernia.  5. No other significant abnormalities are identified. No evidence for  appendicitis is seen. No definite etiology for patient's symptoms is  identified.    OZ BAIG MD         10/22/2017 10:18 PM      Component Results     Component Value Ref Range & Units Status    Sodium 135 133 - 144 mmol/L Final    Potassium 4.4 3.4 - 5.3 mmol/L Final    Specimen slightly hemolyzed, potassium may be falsely elevated    Chloride 102 94 - 109 mmol/L Final    Carbon Dioxide 25 20 - 32 mmol/L Final    Anion Gap 8 3 - 14 mmol/L Final    Glucose 118 (H) 70 - 99 mg/dL Final    Urea Nitrogen 17 7 - 30 mg/dL Final    Specimen run with a dilution    Creatinine 1.38 (H) 0.52 - 1.04 mg/dL Final    GFR Estimate 43 (L) >60 mL/min/1.7m2 Final    Non  GFR Calc    GFR Estimate If Black 51 (L) >60 mL/min/1.7m2 Final    African American  GFR Calc    Calcium 7.9 (L) 8.5 - 10.1 mg/dL Final    Bilirubin Total 0.5 0.2 - 1.3 mg/dL Final    Albumin 3.4 3.4 - 5.0 g/dL Final    Protein Total 7.1 6.8 - 8.8 g/dL Final    Specimen run with a dilution    Alkaline Phosphatase 132 40 - 150 U/L Final    ALT 88 (H) 0 - 50 U/L Final    Specimen run with a dilution    AST  0 - 45 U/L Final    Unsatisfactory specimen - hemolyzed         10/22/2017  9:54 PM      Component Results     Component Value Ref Range & Units Status    WBC 9.1 4.0 - 11.0 10e9/L Final    RBC Count 3.56 (L) 3.8 - 5.2 10e12/L Final    Hemoglobin 11.6 (L) 11.7 - 15.7 g/dL Final    Hematocrit 34.1 (L) 35.0 - 47.0 % Final    MCV 96 78 - 100 fl Final    MCH 32.6 26.5 - 33.0 pg Final    MCHC 34.0 31.5 - 36.5 g/dL Final    RDW 14.3 10.0 - 15.0 % Final    Platelet Count 194 150 - 450 10e9/L Final    Diff Method Automated Method  Final    % Neutrophils 55.8 % Final    % Lymphocytes 27.6 % Final    % Monocytes 4.9 % Final    % Eosinophils 9.0 % Final    % Basophils 1.2 % Final    % Immature Granulocytes 1.5 % Final    Nucleated RBCs 0 0 /100 Final    Absolute Neutrophil 5.1 1.6 - 8.3 10e9/L Final    Absolute Lymphocytes 2.5 0.8 - 5.3 10e9/L Final    Absolute Monocytes 0.5 0.0 - 1.3 10e9/L Final    Absolute Eosinophils 0.8 (H) 0.0 - 0.7 10e9/L Final    Absolute Basophils 0.1 0.0 - 0.2 10e9/L Final    Abs Immature Granulocytes 0.1 0 - 0.4 10e9/L Final    Absolute Nucleated RBC 0.0  Final         10/22/2017 10:09 PM      Component Results     Component Value Ref Range & Units Status    Lipase 203 73 - 393 U/L Final         10/22/2017 11:20 PM      Component Results     Component Value Ref Range & Units Status    AST 68 (H) 0 - 45 U/L Final                Clinical Quality Measure: Blood Pressure Screening     Your blood pressure was checked while you were in the emergency department today. The last reading we obtained was  BP: (!) 137/101 . Please read the guidelines below about what these numbers mean and  "what you should do about them.  If your systolic blood pressure (the top number) is less than 120 and your diastolic blood pressure (the bottom number) is less than 80, then your blood pressure is normal. There is nothing more that you need to do about it.  If your systolic blood pressure (the top number) is 120-139 or your diastolic blood pressure (the bottom number) is 80-89, your blood pressure may be higher than it should be. You should have your blood pressure rechecked within a year by a primary care provider.  If your systolic blood pressure (the top number) is 140 or greater or your diastolic blood pressure (the bottom number) is 90 or greater, you may have high blood pressure. High blood pressure is treatable, but if left untreated over time it can put you at risk for heart attack, stroke, or kidney failure. You should have your blood pressure rechecked by a primary care provider within the next 4 weeks.  If your provider in the emergency department today gave you specific instructions to follow-up with your doctor or provider even sooner than that, you should follow that instruction and not wait for up to 4 weeks for your follow-up visit.        Thank you for choosing [facility]       Thank you for choosing [facility] for your care. Our goal is always to provide you with excellent care. Hearing back from our patients is one way we can continue to improve our services. Please take a few minutes to complete the written survey that you may receive in the mail after you visit with us. Thank you!        [employer] Information     [employer] lets you send messages to your doctor, view your test results, renew your prescriptions, schedule appointments and more. To sign up, go to [email or web address] . Click on \"Log in\" on the left side of the screen, which will take you to the Welcome page. Then click on \"Sign up Now\" on the right side of the page.     You will be asked to enter the access code listed below, as well as " some personal information. Please follow the directions to create your username and password.     Your access code is: [email or web address]  Expires: 2018 12:02 AM     Your access code will  in 90 days. If you need help or a new code, please call your [facility] clinic or [phone].        Care EveryWhere ID     This is your Care EveryWhere ID. This could be used by other organizations to access your [facility] medical records  [id number]        Equal Access to Services     [facility] : Marie corbino Soarden, waaxda luqadaha, qaybta kaalmada adeconnie, john taylor . So [facility] [phone].    ATENCIÓN: Si habla español, tiene a emmanuel disposición servicios gratuitos de asistencia lingüística. Llame al [phone].    We comply with applicable federal civil rights laws and [place] laws. We do not discriminate on the basis of race, color, national origin, age, disability, sex, sexual orientation, or gender identity.            After Visit Summary       This is your record. Keep this with you and show to your community pharmacist(s) and doctor(s) at your next visit.

## 2017-10-23 LAB
BACTERIA SPEC CULT: NORMAL
Lab: NORMAL
SPECIMEN SOURCE: NORMAL

## 2017-10-23 NOTE — ED NOTES
Right sided flank pain x 1 week. Hst of kidney stones and kidney infection. Pt states she doesn't get UTI symptoms.   Pain worsening despite ibuprofen.

## 2017-10-23 NOTE — DISCHARGE INSTRUCTIONS
Take the below medications as prescribed.  Please do not miss any doses.    New Prescriptions    CIPROFLOXACIN (CIPRO) 500 MG TABLET    Take 1 tablet (500 mg) by mouth 2 times daily for 7 days    TRAMADOL (ULTRAM) 50 MG TABLET    Take 1-2 tablets ( mg) by mouth every 6 hours as needed for pain maximum 8 tablet(s) per day     1. -Take acetaminophen 500 to 1000 mg by mouth every 4 to 6 hours as needed for pain or fever.  Do not take more than 4000 mg in 24 hours.  Do not take within 6 hours of another acetaminophen containing medication such as norco (vicodin) or percocet.  - Take ibuprofen 600 to 800 mg by mouth every 6 to 8 hours as needed for pain or fever  2. Please follow-up with a primary doctor this week.  3. Please return to the ED as needed for new or worsening symptoms such as fever greater than 100.4 F, severe and uncontrollable pain, vomiting and unable to keep anything down, any other concerning symptoms.    Discharge Instructions  Urinary Tract Infection  You or your child have been diagnosed with a urinary tract infection, or UTI. The urinary tract includes the kidneys (which make urine/pee), ureters (the tubes that carry urine/pee from the kidneys to the bladder), the bladder (which stores urine/pee), and urethra (the tube that carries urine/pee out of the bladder). Urinary tract infections occur when bacteria travel up the urethra into the bladder (bladder infection) and, in some cases, from there into the kidneys (kidney infection).  Generally, every Emergency Department visit should have a follow-up clinic visit with either a primary or a specialty clinic/provider. Please follow-up as instructed by your emergency provider today.  Return to the Emergency Department if:    You or your child have severe back pain.    You or your child are vomiting (throwing up) so that you cannot take your medicine.    You or your child have a new fever (had not previously had a fever) over 101 F.    You or your  child have confusion or are very weak, or feel very ill.    Your child seems much more ill, will not wake up, will not respond right, or is crying for a long time and will not calm down.    You or your child are showing signs of dehydration. These signs may include decreased urination (pee), dry mouth/gums/tongue, or decreased activity.    Follow-up with your provider:     Children under 24 months need to be seen by their regular provider within one week after a diagnosis of a UTI. It may be necessary to do some more tests to look at the child s kidney or bladder.    You should begin to feel better within 24 - 48 hours of starting your antibiotic; follow-up with your regular clinic/doctor/provider if this is not the case.    Treatment:     You will be treated with an antibiotic to kill the bacteria. We have to make an educated guess, based on what we know about common bacteria and antibiotics, as to which antibiotic will work for your infection. We will be correct most times but there will be some cases where the antibiotic chosen is not correct (see urine cultures below).    Take a pain medication such as acetaminophen (Tylenol ) or ibuprofen (Advil , Motrin , Nuprin ).    Phenazopyridine (Pyridium , Uristat ) is a prescription medication that numbs the bladder to reduce the burning pain of some UTIs.  The same medication is available in a non-prescription version (Azo-Standard , Urodol ). This medication will change the color of the urine and tears (usually blue or orange). If you wear contacts, do not wear them while taking this medication as they may be stained by the medication.    Urine Cultures:    If indicated, a urine culture may have been performed today. This test generally takes 24-48 hours to complete so the results are not known at this time. The results can confirm that an infection is present but also determine which antibiotic is effective for the specific bacteria that is causing the infection. If  "your urine culture shows that the antibiotic you were given today will not work to treat your infection, we will attempt to contact you to make arrangements to change the antibiotic. If the culture confirms that the antibiotic is effective for your infection, you will not be contacted. We often recommend follow-up with your regular physician/provider on the culture results regardless of this process.    Antibiotic Warning:     If you have been placed on antibiotics - watch for signs of allergic reaction.  These include rash, lip swelling, difficulty breathing, wheezing, and dizziness.  If you develop any of these symptoms, stop the antibiotic immediately and go to an emergency room or urgent care for evaluation.    Probiotics: If you have been given an antibiotic, you may want to also take a probiotic pill or eat yogurt with live cultures. Probiotics have \"good bacteria\" to help your intestines stay healthy. Studies have shown that probiotics help prevent diarrhea and other intestine problems (including C. diff infection) when you take antibiotics. You can buy these without a prescription in the pharmacy section of the store.   If you were given a prescription for medicine here today, be sure to read all of the information (including the package insert) that comes with your prescription.  This will include important information about the medicine, its side effects, and any warnings that you need to know about.  The pharmacist who fills the prescription can provide more information and answer questions you may have about the medicine.  If you have questions or concerns that the pharmacist cannot address, please call or return to the Emergency Department.   Remember that you can always come back to the Emergency Department if you are not able to see your regular provider in the amount of time listed above, if you get any new symptoms, or if there is anything that worries you.      "

## 2017-10-23 NOTE — ED PROVIDER NOTES
"  History     Chief Complaint:  Flank Pain    HPI   Enoch Barrios is a 37 year old female with a history of chronic kidney disease, kidney stones, and kidney infection who presents to the emergency department today for evaluation of flank pain. For the past week, the patient has been experiencing worsening right-sided flank pain. She rates the pain 7/10, which makes it difficult for her to stand up. Walking exacerbates the pain. For the last couple of days, her flank pain has been constant and \"throbbing\" with associated pain in the \"middle area\" of her back. She endorses occasional dizziness at work, occasional \"sharp pinching pain\" when sitting, and increased frequency of urination. She denies fever, chills, dysuria, nausea, or vomiting. She also denies any changes in her bowel movements.  Denies symptoms are worse with deep breaths.  She has taken Ibuprofen with no resolution of her symptoms.    Allergies:  Drug allergies reviewed. No pertinent drug allergies.     Medications:    ibuprofen (ADVIL/MOTRIN) 200 MG tablet  metoprolol (LOPRESSOR) 50 MG tablet  Levothyroxine Sodium (SYNTHROID PO)    Past Medical History:    Abnormal Pap smear   Chronic kidney disease   Hypertension   Thyroid disease     Past Surgical History:    CYSTOSCOPY, RETROGRADES, INSERT STENT URETER(S), COMBINED - Right   DILATION AND CURETTAGE SUCTION    Family History:    Family history reviewed. No pertinent family history.     Social History:  The patient was accompanied to the ED by family.  Smoking Status: Never Smoker  Smokeless Tobacco: Never Used  Alcohol Use: Negative   Marital Status:  Single     Review of Systems   Constitutional: Negative for chills and fever.   Gastrointestinal: Negative for constipation, diarrhea, nausea and vomiting.   Genitourinary: Positive for flank pain and frequency. Negative for dysuria.   Musculoskeletal: Positive for back pain.   Neurological: Positive for dizziness.   All other systems reviewed and are " negative.    Physical Exam     Patient Vitals for the past 24 hrs:   BP Temp Temp src Pulse Heart Rate Resp SpO2   10/22/17 2345 - - - - - - 98 %   10/22/17 2315 - - - - - - 98 %   10/22/17 2300 (!) 137/101 - - - - - 97 %   10/22/17 2245 - - - - - - 97 %   10/22/17 2230 (!) 123/93 - - - - - 100 %   10/22/17 2215 - - - - - - 98 %   10/22/17 2200 (!) 125/99 - - - - - 95 %   10/22/17 2145 - - - - - - 98 %   10/22/17 2130 (!) 123/96 - - - - - 94 %   10/22/17 2123 - - - - - - 97 %   10/22/17 2121 (!) 149/105 - - - - - -   10/22/17 1955 (!) 167/119 97.6  F (36.4  C) Oral 92 92 18 100 %         Physical Exam  Constitutional: Well developed, nontox appearance.  Head: Atraumatic.   Mouth/Throat: Oropharynx is clear and moist.   Neck:  no stridor  Eyes: no scleral icterus  Cardiovascular: RRR, 2+ bilat radial pulses  Pulmonary/Chest: nml resp effort, Clear BS bilat  Abdominal: ND, +BS, soft, NT, no rebound or guarding .  Unable to reproduce the patient's pain  : no CVA tenderness bilat  Ext: WWP, no edema  Neurological: A&O, symmetric facies, moves ext x4  Skin: Skin is warm and dry.   Psychiatric: Behavior is normal. Thought content normal.   Nursing note and vitals reviewed.      Emergency Department Course     Imaging:  Radiology findings were communicated with the patient who voiced understanding of the findings.    CT Abdomen Pelvis WITHOUT Contrast (stone protocol)  IMPRESSION:  1. Right renal atrophy and nonobstructive right intrarenal calculi are  noted.  2. Nonobstructive left intrarenal calculi with mild left renal  scarring.  3. No evidence for obstructive urinary system process.  4. Fat-containing small umbilical hernia.  5. No other significant abnormalities are identified. No evidence for  appendicitis is seen. No definite etiology for patient's symptoms is  Identified.  Reading per radiology     Laboratory:  Laboratory findings were communicated with the patient who voiced understanding of the  findings.    AST: 68 (H)  CBC: WBC 9.1, HGB 11.6 (L),   CMP: Glucose (Collected 2045) 118 (H), Calcium 7.9 (L), ALT 88 (H), GFR Estimate 43 (L), GFR Estimate if Black 51 (L) o/w WNL (Creatinine 1.38 (H))  Lipase: 203  UA reflex to Microscopic: Leukocyte Esterase small (A), Squamous Epithelial/HPF 17 (H), Protein Albumin 100 (A), RBC/HPF 3 (H), Mucous Present (A), WBC/HPF 15 (H) o/w WNL        Urine culture: Pending  HCG qualitative urine: Negative     Interventions:  2124 Dilaudid 0.5 mg IV   2124 Zofran 4 mg IV  2340 Dilaudid 0.5 mg IV  2340 Rocephin 1 g IV     Emergency Department Course:    Nursing notes and vitals reviewed.    2050 I performed an exam of the patient as documented above.     IV was inserted and blood was drawn for laboratory testing, results above.    The patient provided a urine sample here in the emergency department. This was sent for laboratory testing, findings above.    The patient was sent for a abdomen pelvis CT while in the emergency department, results above.     I personally reviewed the lab and imaging results with the patient and answered all related questions prior to discharge.    I discussed the treatment plan with the patient. They expressed understanding of this plan and consented to discharge. They will be discharged home with instructions for care and follow up. In addition, the patient will return to the emergency department if their symptoms persist, worsen, if new symptoms arise or if there is any concern.  All questions were answered.    Impression & Plan      Medical Decision Making:  Enoch Barrios is a 37 year old female who presenting with R flank pain    Ddx includes UTI, pyelonephritis, kidney stone, ureteral stone, abd pain NOS.  Labs ordered as noted above, UA as above, transaminases mildly elevated.  CT abd pelvis as above w/o explanation for pt's abdominal discomfort.  Not abd tenderness upon initial or rpt check.  Given w/ RBCs, WBCs the pt was given  ceftriaxone and started on cipro.  No bacterial noted despite presence of squamous cells.  I discussed with the pt that her UA is not overwhelmingly consistent with a UTI.  Doubt PE given pain is not pleuritic, pt PERC neg.  I this point I feel she is safe for dc.  Recommendations given regarding follow up with primary care doctor and return to the emergency department as needed for new or worsening symptoms.  Counseled on all results, disposition and diagnosis.  Pt is understanding and agreeable to plan. Patient discharged in stable condition.        Diagnosis:    ICD-10-CM    1. Flank pain R10.9      Disposition:   The patient is discharged to home.    Discharge Medications:  New Prescriptions    CIPROFLOXACIN (CIPRO) 500 MG TABLET    Take 1 tablet (500 mg) by mouth 2 times daily for 7 days    TRAMADOL (ULTRAM) 50 MG TABLET    Take 1-2 tablets ( mg) by mouth every 6 hours as needed for pain maximum 8 tablet(s) per day       Scribe Disclosure:  I, Stacey Bello, am serving as a scribe at 7:52 PM on 10/22/2017 to document services personally performed by Pablo Romano MD, based on my observations and the provider's statements to me.       [facility] EMERGENCY DEPARTMENT       Pablo Romano MD  10/23/17 1005

## 2018-03-01 ENCOUNTER — APPOINTMENT (OUTPATIENT)
Dept: ULTRASOUND IMAGING | Facility: CLINIC | Age: 38
End: 2018-03-01
Attending: EMERGENCY MEDICINE
Payer: COMMERCIAL

## 2018-03-01 ENCOUNTER — HOSPITAL ENCOUNTER (EMERGENCY)
Facility: CLINIC | Age: 38
Discharge: HOME | End: 2018-03-02
Attending: EMERGENCY MEDICINE | Admitting: EMERGENCY MEDICINE
Payer: COMMERCIAL

## 2018-03-01 DIAGNOSIS — R10.9 RIGHT FLANK PAIN: ICD-10-CM

## 2018-03-01 DIAGNOSIS — N39.0 URINARY TRACT INFECTION WITHOUT HEMATURIA, SITE UNSPECIFIED: ICD-10-CM

## 2018-03-01 LAB
ALBUMIN SERPL-MCNC: 3.9 G/DL (ref 3.4–5)
ALP SERPL-CCNC: 105 U/L (ref 40–150)
ALT SERPL W P-5'-P-CCNC: 43 U/L (ref 0–50)
ANION GAP SERPL CALCULATED.3IONS-SCNC: 6 MMOL/L (ref 3–14)
AST SERPL W P-5'-P-CCNC: 48 U/L (ref 0–45)
BASOPHILS # BLD AUTO: 0.1 10E9/L (ref 0–0.2)
BASOPHILS NFR BLD AUTO: 1.5 %
BILIRUB DIRECT SERPL-MCNC: <0.1 MG/DL (ref 0–0.2)
BILIRUB SERPL-MCNC: 0.7 MG/DL (ref 0.2–1.3)
BUN SERPL-MCNC: 18 MG/DL (ref 7–30)
CALCIUM SERPL-MCNC: 9.1 MG/DL (ref 8.5–10.1)
CHLORIDE SERPL-SCNC: 100 MMOL/L (ref 94–109)
CO2 SERPL-SCNC: 28 MMOL/L (ref 20–32)
CREAT SERPL-MCNC: 1.4 MG/DL (ref 0.52–1.04)
DIFFERENTIAL METHOD BLD: NORMAL
EOSINOPHIL # BLD AUTO: 0.5 10E9/L (ref 0–0.7)
EOSINOPHIL NFR BLD AUTO: 5.8 %
ERYTHROCYTE [DISTWIDTH] IN BLOOD BY AUTOMATED COUNT: 14.7 % (ref 10–15)
GFR SERPL CREATININE-BSD FRML MDRD: 42 ML/MIN/1.7M2
GLUCOSE SERPL-MCNC: 103 MG/DL (ref 70–99)
HCT VFR BLD AUTO: 36.9 % (ref 35–47)
HGB BLD-MCNC: 11.9 G/DL (ref 11.7–15.7)
IMM GRANULOCYTES # BLD: 0.3 10E9/L (ref 0–0.4)
IMM GRANULOCYTES NFR BLD: 2.8 %
LYMPHOCYTES # BLD AUTO: 3.1 10E9/L (ref 0.8–5.3)
LYMPHOCYTES NFR BLD AUTO: 32.6 %
MCH RBC QN AUTO: 30.4 PG (ref 26.5–33)
MCHC RBC AUTO-ENTMCNC: 32.2 G/DL (ref 31.5–36.5)
MCV RBC AUTO: 94 FL (ref 78–100)
MONOCYTES # BLD AUTO: 0.5 10E9/L (ref 0–1.3)
MONOCYTES NFR BLD AUTO: 5.7 %
NEUTROPHILS # BLD AUTO: 4.9 10E9/L (ref 1.6–8.3)
NEUTROPHILS NFR BLD AUTO: 51.6 %
NRBC # BLD AUTO: 0 10*3/UL
NRBC BLD AUTO-RTO: 0 /100
PLATELET # BLD AUTO: 176 10E9/L (ref 150–450)
POTASSIUM SERPL-SCNC: 4.3 MMOL/L (ref 3.4–5.3)
PROT SERPL-MCNC: 8 G/DL (ref 6.8–8.8)
RBC # BLD AUTO: 3.92 10E12/L (ref 3.8–5.2)
SODIUM SERPL-SCNC: 134 MMOL/L (ref 133–144)
WBC # BLD AUTO: 9.4 10E9/L (ref 4–11)

## 2018-03-01 PROCEDURE — 85025 COMPLETE CBC W/AUTO DIFF WBC: CPT | Performed by: EMERGENCY MEDICINE

## 2018-03-01 PROCEDURE — 80048 BASIC METABOLIC PNL TOTAL CA: CPT | Performed by: EMERGENCY MEDICINE

## 2018-03-01 PROCEDURE — 99284 EMERGENCY DEPT VISIT MOD MDM: CPT | Mod: 25

## 2018-03-01 PROCEDURE — 81001 URINALYSIS AUTO W/SCOPE: CPT | Performed by: EMERGENCY MEDICINE

## 2018-03-01 PROCEDURE — 80076 HEPATIC FUNCTION PANEL: CPT | Performed by: EMERGENCY MEDICINE

## 2018-03-01 PROCEDURE — 81025 URINE PREGNANCY TEST: CPT | Performed by: EMERGENCY MEDICINE

## 2018-03-01 PROCEDURE — 76775 US EXAM ABDO BACK WALL LIM: CPT

## 2018-03-01 RX ORDER — IPRATROPIUM BROMIDE AND ALBUTEROL SULFATE 2.5; .5 MG/3ML; MG/3ML
SOLUTION RESPIRATORY (INHALATION)
Status: DISCONTINUED
Start: 2018-03-01 | End: 2018-03-02 | Stop reason: HOSPADM

## 2018-03-01 RX ORDER — OXYCODONE AND ACETAMINOPHEN 5; 325 MG/1; MG/1
1 TABLET ORAL ONCE
Status: COMPLETED | OUTPATIENT
Start: 2018-03-01 | End: 2018-03-02

## 2018-03-01 ASSESSMENT — ENCOUNTER SYMPTOMS
DYSURIA: 0
FLANK PAIN: 1
FEVER: 0
HEMATURIA: 0
FREQUENCY: 0
ABDOMINAL PAIN: 1

## 2018-03-01 NOTE — ED AVS SNAPSHOT
[facility]    [address] [place] [address]    Phone:  [phone]    Fax:  [phone]                                       Enoch Barrios   MRN: [id number]    Department:  [facility]   Date of Visit:  3/1/2018           Patient Information     Date Of Birth          1980        Your diagnoses for this visit were:     Urinary tract infection without hematuria, site unspecified     Right flank pain        You were seen by Lauri Lopez MD.      Follow-up Information     Follow up with [facility], [place] In 3 days.    Specialty:  Family Practice    Contact information:    [address] [facility]   [place] [address]  [phone]          Follow up with [facility].    Specialty:  EMERGENCY MEDICINE    Why:  If symptoms worsen    Contact information:    [address] [place] [address]  [phone]        Discharge Instructions       Discharge Instructions  Urinary Tract Infection  You or your child have been diagnosed with a urinary tract infection, or UTI. The urinary tract includes the kidneys (which make urine/pee), ureters (the tubes that carry urine/pee from the kidneys to the bladder), the bladder (which stores urine/pee), and urethra (the tube that carries urine/pee out of the bladder). Urinary tract infections occur when bacteria travel up the urethra into the bladder (bladder infection) and, in some cases, from there into the kidneys (kidney infection).  Generally, every Emergency Department visit should have a follow-up clinic visit with either a primary or a specialty clinic/provider. Please follow-up as instructed by your emergency provider today.  Return to the Emergency Department if:    You or your child have severe back pain.    You or your child are vomiting (throwing up) so that you cannot take your medicine.    You or your child have a new  fever (had not previously had a fever) over 101 F.    You or your child have confusion or are very weak, or feel very ill.    Your child seems much more ill, will not wake up, will not respond right, or is crying for a long time and will not calm down.    You or your child are showing signs of dehydration. These signs may include decreased urination (pee), dry mouth/gums/tongue, or decreased activity.    Follow-up with your provider:     Children under 24 months need to be seen by their regular provider within one week after a diagnosis of a UTI. It may be necessary to do some more tests to look at the child s kidney or bladder.    You should begin to feel better within 24 - 48 hours of starting your antibiotic; follow-up with your regular clinic/doctor/provider if this is not the case.    Treatment:     You will be treated with an antibiotic to kill the bacteria. We have to make an educated guess, based on what we know about common bacteria and antibiotics, as to which antibiotic will work for your infection. We will be correct most times but there will be some cases where the antibiotic chosen is not correct (see urine cultures below).    Take a pain medication such as acetaminophen (Tylenol ) or ibuprofen (Advil , Motrin , Nuprin ).    Phenazopyridine (Pyridium , Uristat ) is a prescription medication that numbs the bladder to reduce the burning pain of some UTIs.  The same medication is available in a non-prescription version (Azo-Standard , Urodol ). This medication will change the color of the urine and tears (usually blue or orange). If you wear contacts, do not wear them while taking this medication as they may be stained by the medication.    Urine Cultures:    If indicated, a urine culture may have been performed today. This test generally takes 24-48 hours to complete so the results are not known at this time. The results can confirm that an infection is present but also determine which antibiotic is  "effective for the specific bacteria that is causing the infection. If your urine culture shows that the antibiotic you were given today will not work to treat your infection, we will attempt to contact you to make arrangements to change the antibiotic. If the culture confirms that the antibiotic is effective for your infection, you will not be contacted. We often recommend follow-up with your regular physician/provider on the culture results regardless of this process.    Antibiotic Warning:     If you have been placed on antibiotics - watch for signs of allergic reaction.  These include rash, lip swelling, difficulty breathing, wheezing, and dizziness.  If you develop any of these symptoms, stop the antibiotic immediately and go to an emergency room or urgent care for evaluation.    Probiotics: If you have been given an antibiotic, you may want to also take a probiotic pill or eat yogurt with live cultures. Probiotics have \"good bacteria\" to help your intestines stay healthy. Studies have shown that probiotics help prevent diarrhea and other intestine problems (including C. diff infection) when you take antibiotics. You can buy these without a prescription in the pharmacy section of the store.   If you were given a prescription for medicine here today, be sure to read all of the information (including the package insert) that comes with your prescription.  This will include important information about the medicine, its side effects, and any warnings that you need to know about.  The pharmacist who fills the prescription can provide more information and answer questions you may have about the medicine.  If you have questions or concerns that the pharmacist cannot address, please call or return to the Emergency Department.   Remember that you can always come back to the Emergency Department if you are not able to see your regular provider in the amount of time listed above, if you get any new symptoms, or if there is " anything that worries you.    24 Hour Appointment Hotline       To make an appointment at any [facility], call [phone] ([phone]). If you don't have a family doctor or clinic, we will help you find one. [facility] are conveniently located to serve the needs of you and your family.             Review of your medicines      START taking        Dose / Directions Last dose taken    ciprofloxacin 500 MG tablet   Commonly known as:  CIPRO   Dose:  500 mg   Quantity:  20 tablet        Take 1 tablet (500 mg) by mouth 2 times daily for 10 days   Refills:  0        HYDROcodone-acetaminophen 5-325 MG per tablet   Commonly known as:  NORCO   Dose:  1 tablet   Quantity:  8 tablet        Take 1 tablet by mouth every 6 hours as needed for pain   Refills:  0        ondansetron 4 MG tablet   Commonly known as:  ZOFRAN   Dose:  4 mg   Quantity:  8 tablet        Take 1 tablet (4 mg) by mouth every 6 hours   Refills:  0          Our records show that you are taking the medicines listed below. If these are incorrect, please call your family doctor or clinic.        Dose / Directions Last dose taken    ibuprofen 200 MG tablet   Commonly known as:  ADVIL/MOTRIN   Dose:  600 mg   Quantity:  60 tablet        Take 3 tablets (600 mg) by mouth every 6 hours as needed for mild pain or fever   Refills:  0        metoprolol tartrate 50 MG tablet   Commonly known as:  LOPRESSOR   Dose:  50 mg        Take 50 mg by mouth 2 times daily   Refills:  0        SYNTHROID PO   Dose:  250 mcg   Indication:  Underactive Thyroid        Take 250 mcg by mouth daily   Refills:  0        traMADol 50 MG tablet   Commonly known as:  ULTRAM   Dose:   mg   Quantity:  20 tablet        Take 1-2 tablets ( mg) by mouth every 6 hours as needed for pain maximum 8 tablet(s) per day   Refills:  0                Prescriptions were sent or printed at these locations (3 Prescriptions)                   Other Prescriptions                 Printed at Department/Unit printer (3 of 3)         ciprofloxacin (CIPRO) 500 MG tablet               HYDROcodone-acetaminophen (NORCO) 5-325 MG per tablet               ondansetron (ZOFRAN) 4 MG tablet                Procedures and tests performed during your visit     Basic metabolic panel (BMP)    CBC + differential    HCG qualitative urine    Hepatic panel    IV access    UA with Microscopic    US Renal Limited      Orders Needing Specimen Collection     None      Pending Results     No orders found for last 3 day(s).            Pending Culture Results     No orders found for last 3 day(s).            Pending Results Instructions     If you had any lab results that were not finalized at the time of your Discharge, you can call the ED Lab Result RN at [phone]. You will be contacted by this team for any positive Lab results or changes in treatment. The nurses are available 7 days a week from 10A to 6:30P.  You can leave a message 24 hours per day and they will return your call.        Test Results From Your Hospital Stay        3/1/2018 11:21 PM      Component Results     Component Value Ref Range & Units Status    WBC 9.4 4.0 - 11.0 10e9/L Final    RBC Count 3.92 3.8 - 5.2 10e12/L Final    Hemoglobin 11.9 11.7 - 15.7 g/dL Final    Hematocrit 36.9 35.0 - 47.0 % Final    MCV 94 78 - 100 fl Final    MCH 30.4 26.5 - 33.0 pg Final    MCHC 32.2 31.5 - 36.5 g/dL Final    RDW 14.7 10.0 - 15.0 % Final    Platelet Count 176 150 - 450 10e9/L Final    Diff Method Automated Method  Final    % Neutrophils 51.6 % Final    % Lymphocytes 32.6 % Final    % Monocytes 5.7 % Final    % Eosinophils 5.8 % Final    % Basophils 1.5 % Final    % Immature Granulocytes 2.8 % Final    Nucleated RBCs 0 0 /100 Final    Absolute Neutrophil 4.9 1.6 - 8.3 10e9/L Final    Absolute Lymphocytes 3.1 0.8 - 5.3 10e9/L Final    Absolute Monocytes 0.5 0.0 - 1.3 10e9/L Final    Absolute Eosinophils 0.5 0.0 - 0.7 10e9/L Final    Absolute Basophils 0.1  0.0 - 0.2 10e9/L Final    Abs Immature Granulocytes 0.3 0 - 0.4 10e9/L Final    Absolute Nucleated RBC 0.0  Final         3/1/2018 11:28 PM      Component Results     Component Value Ref Range & Units Status    Sodium 134 133 - 144 mmol/L Final    Potassium 4.3 3.4 - 5.3 mmol/L Final    Specimen slightly hemolyzed, potassium may be falsely elevated    Chloride 100 94 - 109 mmol/L Final    Carbon Dioxide 28 20 - 32 mmol/L Final    Anion Gap 6 3 - 14 mmol/L Final    Glucose 103 (H) 70 - 99 mg/dL Final    Urea Nitrogen 18 7 - 30 mg/dL Final    Creatinine 1.40 (H) 0.52 - 1.04 mg/dL Final    GFR Estimate 42 (L) >60 mL/min/1.7m2 Final    Non  GFR Calc    GFR Estimate If Black 51 (L) >60 mL/min/1.7m2 Final    African American GFR Calc    Calcium 9.1 8.5 - 10.1 mg/dL Final         3/2/2018 12:13 AM      Narrative     US RENAL LIMITED  3/1/2018 11:52 PM     INDICATION: Right flank pain.    COMPARISON: None    FINDINGS: Ultrasound examination demonstrates a 0.8 cm echogenic focus  with posterior shadowing at the lower pole of the right kidney  compatible with a nonobstructing stone. No renal masses or  hydronephrosis on either side. The right kidney measures 8 cm pole to  pole; The left kidney measures 11.2 cm.    There are some low-level echoes in the bladder. Incidentally noted is  a fatty-infiltrated liver.        Impression     IMPRESSION:  1. No hydronephrosis or acute findings.  2. Nonobstructing stone lower right kidney. Right kidney is smaller  than the left.  3. Low-level echoes in the bladder which may be due to proteinaceous  debris or sediment. Correlate with urinalysis.  4. Fatty liver.    ROBERT HARPER MD         3/1/2018 11:28 PM      Component Results     Component Value Ref Range & Units Status    Bilirubin Direct <0.1 0.0 - 0.2 mg/dL Final    Bilirubin Total 0.7 0.2 - 1.3 mg/dL Final    Albumin 3.9 3.4 - 5.0 g/dL Final    Protein Total 8.0 6.8 - 8.8 g/dL Final    Alkaline Phosphatase 105  40 - 150 U/L Final    ALT 43 0 - 50 U/L Final    AST 48 (H) 0 - 45 U/L Final    Specimen is hemolyzed which can falsely elevate AST. Analysis of a   non-hemolyzed specimen may result in a lower value.           3/2/2018 12:11 AM      Component Results     Component Value Ref Range & Units Status    Color Urine Yellow  Final    Appearance Urine Cloudy  Final    Glucose Urine Negative NEG^Negative mg/dL Final    Bilirubin Urine Negative NEG^Negative Final    Ketones Urine Negative NEG^Negative mg/dL Final    Specific Gravity Urine 1.015 1.003 - 1.035 Final    Blood Urine Negative NEG^Negative Final    pH Urine 5.0 5.0 - 7.0 pH Final    Protein Albumin Urine 30 (A) NEG^Negative mg/dL Final    Urobilinogen mg/dL 0.0 0.0 - 2.0 mg/dL Final    Nitrite Urine Negative NEG^Negative Final    Leukocyte Esterase Urine Large (A) NEG^Negative Final    Source Midstream Urine  Final    WBC Urine 109 (H) 0 - 5 /HPF Final    RBC Urine 18 (H) 0 - 2 /HPF Final    WBC Clumps Present (A) NEG^Negative /HPF Final    Bacteria Urine Many (A) NEG^Negative /HPF Final    Squamous Epithelial /HPF Urine 42 (H) 0 - 1 /HPF Final    Mucous Urine Present (A) NEG^Negative /LPF Final         3/2/2018 12:19 AM      Component Results     Component Value Ref Range & Units Status    HCG Qual Urine Negative NEG^Negative Final    This test is for screening purposes.  Results should be interpreted along with   the clinical picture.  Confirmation testing is available if warranted by   ordering UNT677, HCG Quantitative Pregnancy.                  Clinical Quality Measure: Blood Pressure Screening     Your blood pressure was checked while you were in the emergency department today. The last reading we obtained was  BP: (!) 127/95 . Please read the guidelines below about what these numbers mean and what you should do about them.  If your systolic blood pressure (the top number) is less than 120 and your diastolic blood pressure (the bottom number) is less than 80,  "then your blood pressure is normal. There is nothing more that you need to do about it.  If your systolic blood pressure (the top number) is 120-139 or your diastolic blood pressure (the bottom number) is 80-89, your blood pressure may be higher than it should be. You should have your blood pressure rechecked within a year by a primary care provider.  If your systolic blood pressure (the top number) is 140 or greater or your diastolic blood pressure (the bottom number) is 90 or greater, you may have high blood pressure. High blood pressure is treatable, but if left untreated over time it can put you at risk for heart attack, stroke, or kidney failure. You should have your blood pressure rechecked by a primary care provider within the next 4 weeks.  If your provider in the emergency department today gave you specific instructions to follow-up with your doctor or provider even sooner than that, you should follow that instruction and not wait for up to 4 weeks for your follow-up visit.        Thank you for choosing [facility]       Thank you for choosing [facility] for your care. Our goal is always to provide you with excellent care. Hearing back from our patients is one way we can continue to improve our services. Please take a few minutes to complete the written survey that you may receive in the mail after you visit with us. Thank you!        [employer] Information     SM[employer] lets you send messages to your doctor, view your test results, renew your prescriptions, schedule appointments and more. To sign up, go to [email or web address] . Click on \"Log in\" on the left side of the screen, which will take you to the Welcome page. Then click on \"Sign up Now\" on the right side of the page.     You will be asked to enter the access code listed below, as well as some personal information. Please follow the directions to create your username and password.     Your access code is: [email or web address]  Expires: 5/31/2018 12:22 AM     Your " access code will  in 90 days. If you need help or a new code, please call your [facility] clinic or [phone].        Care EveryWhere ID     This is your Care EveryWhere ID. This could be used by other organizations to access your [facility] medical records  [id number]        Equal Access to Services     HOA OROZCO : Hadii aad ku hadasho Soarden, waaxda luqadaha, qaybta kaalmada shanti, john buck. So [place] -[phone].    ATENCIÓN: Si habla español, tiene a emmanuel disposición servicios gratuitos de asistencia lingüística. Llame al [phone].    We comply with applicable federal civil rights laws and [place] laws. We do not discriminate on the basis of race, color, national origin, age, disability, sex, sexual orientation, or gender identity.            After Visit Summary       This is your record. Keep this with you and show to your community pharmacist(s) and doctor(s) at your next visit.

## 2018-03-01 NOTE — LETTER
March 2, 2018      To Whom It May Concern:      Enoch Barrios was seen in our Emergency Department today, 03/02/18.  She needs to be excused from work today due to medical reasons.    Sincerely,        Lauri Lopez MD

## 2018-03-01 NOTE — ED AVS SNAPSHOT
[facility]    [address]    [facility] [address]    Phone:  [phone]    Fax:  [phone]                                       Enoch Barrios   MRN: [id number]    Department:  [facility]   Date of Visit:  3/1/2018           After Visit Summary Signature Page     I have received my discharge instructions, and my questions have been answered. I have discussed any challenges I see with this plan with the nurse or doctor.    ..........................................................................................................................................  Patient/Patient Representative Signature      ..........................................................................................................................................  Patient Representative Print Name and Relationship to Patient    ..................................................               ................................................  Date                                            Time    ..........................................................................................................................................  Reviewed by Signature/Title    ...................................................              ..............................................  Date                                                            Time

## 2018-03-02 VITALS
HEIGHT: 66 IN | RESPIRATION RATE: 18 BRPM | OXYGEN SATURATION: 100 % | TEMPERATURE: 97.7 F | SYSTOLIC BLOOD PRESSURE: 122 MMHG | WEIGHT: 226 LBS | DIASTOLIC BLOOD PRESSURE: 77 MMHG | BODY MASS INDEX: 36.32 KG/M2 | HEART RATE: 96 BPM

## 2018-03-02 LAB
ALBUMIN UR-MCNC: 30 MG/DL
APPEARANCE UR: ABNORMAL
BACTERIA #/AREA URNS HPF: ABNORMAL /HPF
BILIRUB UR QL STRIP: NEGATIVE
COLOR UR AUTO: YELLOW
GLUCOSE UR STRIP-MCNC: NEGATIVE MG/DL
HCG UR QL: NEGATIVE
HGB UR QL STRIP: NEGATIVE
KETONES UR STRIP-MCNC: NEGATIVE MG/DL
LEUKOCYTE ESTERASE UR QL STRIP: ABNORMAL
MUCOUS THREADS #/AREA URNS LPF: PRESENT /LPF
NITRATE UR QL: NEGATIVE
PH UR STRIP: 5 PH (ref 5–7)
RBC #/AREA URNS AUTO: 18 /HPF (ref 0–2)
SOURCE: ABNORMAL
SP GR UR STRIP: 1.01 (ref 1–1.03)
SQUAMOUS #/AREA URNS AUTO: 42 /HPF (ref 0–1)
UROBILINOGEN UR STRIP-MCNC: 0 MG/DL (ref 0–2)
WBC #/AREA URNS AUTO: 109 /HPF (ref 0–5)
WBC CLUMPS #/AREA URNS HPF: PRESENT /HPF

## 2018-03-02 PROCEDURE — 25000132 ZZH RX MED GY IP 250 OP 250 PS 637: Performed by: EMERGENCY MEDICINE

## 2018-03-02 RX ORDER — HYDROCODONE BITARTRATE AND ACETAMINOPHEN 5; 325 MG/1; MG/1
1 TABLET ORAL EVERY 6 HOURS PRN
Qty: 8 TABLET | Refills: 0 | Status: SHIPPED | OUTPATIENT
Start: 2018-03-02 | End: 2019-11-29

## 2018-03-02 RX ORDER — CIPROFLOXACIN 500 MG/1
500 TABLET, FILM COATED ORAL 2 TIMES DAILY
Qty: 20 TABLET | Refills: 0 | Status: SHIPPED | OUTPATIENT
Start: 2018-03-02 | End: 2018-03-12

## 2018-03-02 RX ORDER — ONDANSETRON 4 MG/1
4 TABLET, FILM COATED ORAL EVERY 6 HOURS
Qty: 8 TABLET | Refills: 0 | Status: ON HOLD | OUTPATIENT
Start: 2018-03-02 | End: 2019-12-02

## 2018-03-02 RX ADMIN — OXYCODONE HYDROCHLORIDE AND ACETAMINOPHEN 1 TABLET: 5; 325 TABLET ORAL at 00:04

## 2018-03-02 NOTE — ED NOTES
Right sided upper abdominal pain x 3 days that wraps around to the back. ABC intact alert and no distress.

## 2018-03-02 NOTE — ED PROVIDER NOTES
"  History     Chief Complaint:  Abdominal pain    HPI   Enoch Barrios is a 37 year old female with a history of kidney stones who presents with abdominal pain. The patient reports that she has been experiencing right sided abdominal and flank pain for the past three days.  Here has been no associated fever or urinary symptoms, though the patient states that she does not typically experience these symptoms with infections.  She states the pain has been fairly constant and is not colicky.  She denies vomiting or diarrhea, and has not noticed any increased pain with eating.        Allergies:  No Known Drug Allergies      Medications:    Tramadol  Metoprolol  Levothyroxine     Past Medical History:    Abnormal Pap smear  Chronic kidney disease  Hypertension  Thyroid disease   Ureterolithiasis     Past Surgical History:    Cystoscopy retrogrades, insert stent ureters combined  D&C    Family History:    The patient denies any relevant family medical history.     Social History:  The patient was accompanied to the ED by her family.  Smoking Status: No  Smokeless Tobacco: No  Alcohol Use: No   Marital Status:  Single [1]    Review of Systems   Constitutional: Negative for fever.   Gastrointestinal: Positive for abdominal pain.   Genitourinary: Positive for flank pain. Negative for dysuria, frequency, hematuria and urgency.   All other systems reviewed and are negative.      Physical Exam   Vitals:  Patient Vitals for the past 24 hrs:   BP Temp Temp src Pulse Resp SpO2 Height Weight   03/02/18 0025 - - - - - 100 % - -   03/02/18 0024 122/77 - - - - - - -   03/01/18 2233 - - - - - 97 % - -   03/01/18 2232 (!) 127/95 - - - - - - -   03/01/18 2231 (!) 127/95 97.7  F (36.5  C) Oral 96 18 94 % 1.676 m (5' 6\") 102.5 kg (226 lb)        Physical Exam  Vital signs and nursing notes reviewed.     Constitutional: laying on gurney appears mildly uncomfortable  HENT: Oropharynx is clear and moist  Eyes: Conjunctivae are normal " bilaterally. Pupils equal  Neck: normal range of motion  Cardiovascular: Normal rate, regular rhythm, normal heart sounds.   Pulmonary/Chest: Effort normal and breath sounds normal. No respiratory distress.   Abdominal: Soft. Bowel sounds are normal. No significant abdominal tenderness to palpation. Mild right flank pain. No rebound or guarding.   Musculoskeletal: No joint swelling or edema.   Neurological: Alert and oriented. No focal weakness  Skin: Skin is warm and dry. No rash noted.   Psych: normal affect    Emergency Department Course     Imaging:  Radiology findings were communicated with the patient who voiced understanding of the findings.  US renal limited:  IMPRESSION:  1. No hydronephrosis or acute findings.  2. Nonobstructing stone lower right kidney. Right kidney is smaller  than the left.  3. Low-level echoes in the bladder which may be due to proteinaceous  debris or sediment. Correlate with urinalysis.  4. Fatty liver.  Reading per radiology.     Laboratory:  Laboratory findings were communicated with the patient who voiced understanding of the findings.  CBC: AWNL (WBC 9.4, HGB 11.9, PLT 1767)  BMP: Glucose: 103(H), Creatinine: 1.40(H), GFR: 51(L), o/w Creatinine: WNL   Hepatic panel: AST: 48(H), o/w WNL  UA: Albumin: 30, Leukocyte esterase: large, WBC: 109(H), RBC: 18(H), WBC clumps: Present, Bacteria: many, Squamous epithelial: 42(H), Mucous: present  HCG qualitative: Negative    Interventions:  0004 Percocet 5-325 mg 1 tablet oral    Emergency Department Course:  Nursing notes and vitals reviewed.  I performed an exam of the patient as documented above.   IV was inserted and blood was drawn for laboratory testing, results above.   The patient was sent for a US while in the emergency department, results above.      2321 I rechecked with the patient    I discussed the treatment plan with the patient. They expressed understanding of this plan and consented to discharge. They will be discharged home  with instructions for care and follow up. In addition, the patient will return to the emergency department if their symptoms persist, worsen, if new symptoms arise or if there is any concern.  All questions were answered.     I personally reviewed the laboratory results with the patient and answered all related questions prior to discharge.    Impression & Plan      Medical Decision Making:  Enoch Barrios is a 37 year old female who presents to the emergency department today with a right sided flank pain for the last couple of days. She is not having any definable fevers, chills, rigors, vomiting, or diarrhea but has not felt her normal self. On examination she has a mild right flank area discomfort but no specific abdominal pain. Her lab tests show no significant change form her baseline creatinine.  Her urinalysis shows findings that are suspicious for urinary tract infection. She does have a history of kidney stones and I obtained renal ultrasound that showed no hydronephrosis to suggest an obvious obstructive process.  Patients understands that without CT imaging we cannot exclude this. Based on her passed culture results I started her on Ciprofloxacin. She is aware of potential side effects from Cipro including tendinopathies and neuropathies and is will stop if these occur. If she has any worsened symptoms vomiting, high fevers, worsened pain, or others she is to return here, otherwise follow up with primary physician in a couple of days.     Diagnosis:    ICD-10-CM   1. Urinary tract infection without hematuria, site unspecified N39.0   2. Right flank pain R10.9        Disposition:   Discharged    CMS Diagnoses: None     Discharge Medications:  Discharge Medication List as of 3/2/2018 12:22 AM      START taking these medications    Details   ciprofloxacin (CIPRO) 500 MG tablet Take 1 tablet (500 mg) by mouth 2 times daily for 10 days, Disp-20 tablet, R-0, Local Print      HYDROcodone-acetaminophen (NORCO)  5-325 MG per tablet Take 1 tablet by mouth every 6 hours as needed for pain, Disp-8 tablet, R-0, Local Print      ondansetron (ZOFRAN) 4 MG tablet Take 1 tablet (4 mg) by mouth every 6 hours, Disp-8 tablet, R-0, Local Print             Scribe Disclosure:  I, Mc Quan, am serving as a scribe at 10:28 PM on 3/1/2018 to document services personally performed by Lauri Lopez MD, based on my observations and the provider's statements to me.   [facility] EMERGENCY DEPARTMENT       Lauri Lopez MD  03/02/18 1133

## 2019-11-24 ENCOUNTER — APPOINTMENT (OUTPATIENT)
Dept: GENERAL RADIOLOGY | Facility: CLINIC | Age: 39
End: 2019-11-24
Attending: EMERGENCY MEDICINE
Payer: COMMERCIAL

## 2019-11-24 ENCOUNTER — HOSPITAL ENCOUNTER (EMERGENCY)
Facility: CLINIC | Age: 39
Discharge: HOME | End: 2019-11-24
Attending: EMERGENCY MEDICINE | Admitting: EMERGENCY MEDICINE
Payer: COMMERCIAL

## 2019-11-24 VITALS
HEART RATE: 84 BPM | OXYGEN SATURATION: 99 % | RESPIRATION RATE: 20 BRPM | DIASTOLIC BLOOD PRESSURE: 90 MMHG | TEMPERATURE: 97.5 F | SYSTOLIC BLOOD PRESSURE: 114 MMHG

## 2019-11-24 DIAGNOSIS — S93.324A LISFRANC DISLOCATION, RIGHT, INITIAL ENCOUNTER: ICD-10-CM

## 2019-11-24 PROCEDURE — 73630 X-RAY EXAM OF FOOT: CPT | Mod: RT

## 2019-11-24 PROCEDURE — 25000132 ZZH RX MED GY IP 250 OP 250 PS 637: Performed by: EMERGENCY MEDICINE

## 2019-11-24 PROCEDURE — 99284 EMERGENCY DEPT VISIT MOD MDM: CPT | Mod: 25

## 2019-11-24 PROCEDURE — 73610 X-RAY EXAM OF ANKLE: CPT | Mod: RT

## 2019-11-24 PROCEDURE — 28470 CLTX METATARSAL FX WO MNP EA: CPT | Mod: RT

## 2019-11-24 RX ORDER — OXYCODONE HYDROCHLORIDE 5 MG/1
5 TABLET ORAL EVERY 4 HOURS PRN
Qty: 15 TABLET | Refills: 0 | Status: ON HOLD | OUTPATIENT
Start: 2019-11-24 | End: 2019-12-02

## 2019-11-24 RX ORDER — OXYCODONE HYDROCHLORIDE 5 MG/1
10 TABLET ORAL ONCE
Status: COMPLETED | OUTPATIENT
Start: 2019-11-24 | End: 2019-11-24

## 2019-11-24 RX ORDER — IBUPROFEN 600 MG/1
600 TABLET, FILM COATED ORAL ONCE
Status: COMPLETED | OUTPATIENT
Start: 2019-11-24 | End: 2019-11-24

## 2019-11-24 RX ADMIN — OXYCODONE HYDROCHLORIDE 10 MG: 5 TABLET ORAL at 08:37

## 2019-11-24 RX ADMIN — IBUPROFEN 600 MG: 600 TABLET, FILM COATED ORAL at 07:54

## 2019-11-24 ASSESSMENT — ENCOUNTER SYMPTOMS
JOINT SWELLING: 1
ARTHRALGIAS: 1

## 2019-11-24 NOTE — LETTER
November 24, 2019      To Whom It May Concern:      Enoch Barrios was seen in our Emergency Department today, 11/24/19.  I expect her condition to improve over the next 2-3 days.  She may return to work/school when improved. No weight bearing on right leg until cleared by Orthopedist.    Sincerely,        Gayle Saucedo RN

## 2019-11-24 NOTE — ED AVS SNAPSHOT
[facility] Emergency Department  [address]  [facility] [address]  Phone:  [phone]  Fax:  [phone]                                    Enoch Barrios   MRN: [id number]    Department:  [facility] Emergency Department   Date of Visit:  11/24/2019           After Visit Summary Signature Page    I have received my discharge instructions, and my questions have been answered. I have discussed any challenges I see with this plan with the nurse or doctor.    ..........................................................................................................................................  Patient/Patient Representative Signature      ..........................................................................................................................................  Patient Representative Print Name and Relationship to Patient    ..................................................               ................................................  Date                                   Time    ..........................................................................................................................................  Reviewed by Signature/Title    ...................................................              ..............................................  Date                                               Time          22EPIC Rev 08/18

## 2019-11-24 NOTE — DISCHARGE INSTRUCTIONS
Please make an appointment to follow up with [facility] [phone] in 3-5 days even if entirely better.    Do not put any weight on the foot.  Wear the boot at all times except for bathing/changing.

## 2019-11-24 NOTE — ED TRIAGE NOTES
Arrives with right foot and leg pain after a fall this morning, alert and oriented, some swelling to foot, ABCs intact.

## 2019-11-24 NOTE — ED PROVIDER NOTES
History     Chief Complaint:  Foot Pain    HPI  Enoch Barrios is a 39 year old female with a history of HTN and thyroid disease who presents to the emergency department today for evaluation of right foot pain. Last night her grandchildren had put Barbies at the end of the bed and when the patient stepped on these to stand up she felt her foot twist and then hear a loud crack. Since then she has been unable to bear weight on the foot and describes a burning pain worst in the top of her foot. The pain is constant and radiates to the lower leg. She is still able to bend her toes and denies any numbness.       Allergies:  No known drug allergies    Medications:    Roxicodone  Norco  Synthroid   Lopressor  Zofran  Tramadol    Past Medical History:    Abnormal Pap smear of cervix  Hypothyroidism  Ureterolithiasis  Miscarriage   CKD  HTN  Thyroid disease    Past Surgical History:    Insert stent ureter right    Family History:    History reviewed. No pertinent family history.     Social History:  The patient reports that she has never smoked. She does not have any smokeless tobacco history on file. She reports that she does not drink alcohol or use drugs.   PCP: [facility], [place]  Marital Status: Single [1]      Review of Systems   Musculoskeletal: Positive for arthralgias and joint swelling.   All other systems reviewed and are negative.      Physical Exam     Patient Vitals for the past 24 hrs:   BP Temp Temp src Pulse Resp SpO2   11/24/19 0742 (!) 114/90 97.5  F (36.4  C) Temporal 84 20 99 %     Physical Exam    Constitutional:  Pleasant, age appropriate.   EYES:   Conjunctiva normal.  NECK:    Supple, no meningismus.   CV:     Regular rate and rhythm     No murmurs, rubs or gallops.       2+ DP pulses bilateral.  PULM:    Clear to auscultation bilateral.       No respiratory distress.      No wheezing, rales or stridor.  ABD:    Soft, non-tender, non-distended.  No pulsatile masses.       No rebound or  guarding.  MSK:     Right lower extremity : No gross deformity.       No tenderness to the proximal fibula..       Durham test within normal limits.        Achilles tendon is palpated without tenderness and without defect.           Mild tenderness to the talotibial joint      Severe pain at the 2-3rd metatarsal with mild soft tissue swelling.    LYMPH:   No cervical lymphadenopathy.  NEURO:   Alert.      Right lower extremity:      Strength and sensation intact.  SKIN:    Warm, dry and intact.       No rash.  PSYCH:    Mood is good and affect is appropriate.    Emergency Department Course     Imaging:  Radiology findings were communicated with the patient who voiced understanding of the findings.  XR Foot GE 3 views left  IMPRESSION: Lisfranc fracture/subluxation. CT recommended for further  Characterization  Reading per radiology    Ankle XR GE 3 views left  IMPRESSION: Lisfranc fracture/subluxation. CT recommended for further  characterization  Reading per radiology    Interventions:  0754 Advil 600 mg PO  0837 Roxicodone 10 mg PO    Emergency Department Course:  Past medical records, nursing notes, and vitals reviewed.  0754: I performed an exam of the patient and obtained history, as documented above.     The patient was sent for a foot and ankle XR while in the emergency department, findings above.    I personally reviewed the laboratory/imaging results with the Patient and answered all related questions prior to discharge.    0838: I rechecked the patient.  Findings and plan explained to the Patient. Patient discharged home with instructions regarding supportive care, medications, and reasons to return. The importance of close follow-up was reviewed.     Impression & Plan      Medical Decision Making:  Enoch Barrios is a 39 year old female who presents to the emergency department today for evaluation of traumatic foot and ankle pain.  Achilles tendon intact.  Plain films of the ankle unremarkable.   Unfortunately patient does have a Lisfranc injury with fracture of the base of the second and likely third metatarsal with significant displacement particularly of the third metatarsal.  Patient will be nonweightbearing, immobilized the right lower extremity with crutches.  Limited supply of analgesics and close follow-up with orthopedic surgery.    Diagnosis:    ICD-10-CM   1. Lisfranc dislocation, right, initial encounter S93.324A       Disposition:   discharged to home    Discharge Medications:     Medication List      Started    oxyCODONE 5 MG tablet  Commonly known as:  ROXICODONE  5 mg, Oral, EVERY 4 HOURS PRN            Scribe Disclosure:  Rosa MITTAL, am serving as a scribe at 7:54 AM on 11/24/2019 to document services personally performed by Woody Singh MD based on my observations and the provider's statements to me.    [facility] EMERGENCY DEPARTMENT       Woody Singh MD  11/24/19 3041

## 2019-11-29 ASSESSMENT — MIFFLIN-ST. JEOR: SCORE: 1702.34

## 2019-12-02 ENCOUNTER — ANESTHESIA (OUTPATIENT)
Dept: SURGERY | Facility: CLINIC | Age: 39
End: 2019-12-02
Payer: COMMERCIAL

## 2019-12-02 ENCOUNTER — ANESTHESIA EVENT (OUTPATIENT)
Dept: SURGERY | Facility: CLINIC | Age: 39
End: 2019-12-02
Payer: COMMERCIAL

## 2019-12-02 ENCOUNTER — HOSPITAL ENCOUNTER (OUTPATIENT)
Facility: CLINIC | Age: 39
Discharge: HOME | End: 2019-12-02
Attending: ORTHOPAEDIC SURGERY | Admitting: ORTHOPAEDIC SURGERY
Payer: COMMERCIAL

## 2019-12-02 ENCOUNTER — APPOINTMENT (OUTPATIENT)
Dept: GENERAL RADIOLOGY | Facility: CLINIC | Age: 39
End: 2019-12-02
Attending: ORTHOPAEDIC SURGERY
Payer: COMMERCIAL

## 2019-12-02 VITALS
RESPIRATION RATE: 16 BRPM | TEMPERATURE: 97 F | HEART RATE: 82 BPM | OXYGEN SATURATION: 97 % | BODY MASS INDEX: 37.93 KG/M2 | HEIGHT: 66 IN | SYSTOLIC BLOOD PRESSURE: 134 MMHG | WEIGHT: 236 LBS | DIASTOLIC BLOOD PRESSURE: 103 MMHG

## 2019-12-02 DIAGNOSIS — S93.324A LISFRANC DISLOCATION, RIGHT, INITIAL ENCOUNTER: Primary | ICD-10-CM

## 2019-12-02 LAB
CREAT SERPL-MCNC: 1.44 MG/DL (ref 0.52–1.04)
GFR SERPL CREATININE-BSD FRML MDRD: 45 ML/MIN/{1.73_M2}
HCG UR QL: NEGATIVE
POTASSIUM SERPL-SCNC: 4.1 MMOL/L (ref 3.4–5.3)

## 2019-12-02 PROCEDURE — 25800030 ZZH RX IP 258 OP 636: Performed by: ANESTHESIOLOGY

## 2019-12-02 PROCEDURE — 71000012 ZZH RECOVERY PHASE 1 LEVEL 1 FIRST HR: Performed by: ORTHOPAEDIC SURGERY

## 2019-12-02 PROCEDURE — 25000125 ZZHC RX 250: Performed by: ORTHOPAEDIC SURGERY

## 2019-12-02 PROCEDURE — 25000132 ZZH RX MED GY IP 250 OP 250 PS 637: Performed by: ORTHOPAEDIC SURGERY

## 2019-12-02 PROCEDURE — 25000128 H RX IP 250 OP 636: Performed by: ORTHOPAEDIC SURGERY

## 2019-12-02 PROCEDURE — 25000125 ZZHC RX 250: Performed by: ANESTHESIOLOGY

## 2019-12-02 PROCEDURE — 37000009 ZZH ANESTHESIA TECHNICAL FEE, EACH ADDTL 15 MIN: Performed by: ORTHOPAEDIC SURGERY

## 2019-12-02 PROCEDURE — 25000125 ZZHC RX 250: Performed by: NURSE ANESTHETIST, CERTIFIED REGISTERED

## 2019-12-02 PROCEDURE — 25000128 H RX IP 250 OP 636: Performed by: NURSE ANESTHETIST, CERTIFIED REGISTERED

## 2019-12-02 PROCEDURE — 25000128 H RX IP 250 OP 636: Performed by: ANESTHESIOLOGY

## 2019-12-02 PROCEDURE — 37000008 ZZH ANESTHESIA TECHNICAL FEE, 1ST 30 MIN: Performed by: ORTHOPAEDIC SURGERY

## 2019-12-02 PROCEDURE — 40000306 ZZH STATISTIC PRE PROC ASSESS II: Performed by: ORTHOPAEDIC SURGERY

## 2019-12-02 PROCEDURE — 40000277 XR SURGERY CARM FLUORO LESS THAN 5 MIN W STILLS: Mod: TC

## 2019-12-02 PROCEDURE — C1713 ANCHOR/SCREW BN/BN,TIS/BN: HCPCS | Performed by: ORTHOPAEDIC SURGERY

## 2019-12-02 PROCEDURE — 84132 ASSAY OF SERUM POTASSIUM: CPT | Performed by: ANESTHESIOLOGY

## 2019-12-02 PROCEDURE — 71000027 ZZH RECOVERY PHASE 2 EACH 15 MINS: Performed by: ORTHOPAEDIC SURGERY

## 2019-12-02 PROCEDURE — 36000058 ZZH SURGERY LEVEL 3 EA 15 ADDTL MIN: Performed by: ORTHOPAEDIC SURGERY

## 2019-12-02 PROCEDURE — 71000013 ZZH RECOVERY PHASE 1 LEVEL 1 EA ADDTL HR: Performed by: ORTHOPAEDIC SURGERY

## 2019-12-02 PROCEDURE — 27211024 ZZHC OR SUPPLY OTHER OPNP: Performed by: ORTHOPAEDIC SURGERY

## 2019-12-02 PROCEDURE — 81025 URINE PREGNANCY TEST: CPT | Performed by: ANESTHESIOLOGY

## 2019-12-02 PROCEDURE — 25000128 H RX IP 250 OP 636: Performed by: PHYSICIAN ASSISTANT

## 2019-12-02 PROCEDURE — 27210794 ZZH OR GENERAL SUPPLY STERILE: Performed by: ORTHOPAEDIC SURGERY

## 2019-12-02 PROCEDURE — 82565 ASSAY OF CREATININE: CPT | Performed by: ANESTHESIOLOGY

## 2019-12-02 PROCEDURE — 36000060 ZZH SURGERY LEVEL 3 W FLUORO 1ST 30 MIN: Performed by: ORTHOPAEDIC SURGERY

## 2019-12-02 PROCEDURE — 36415 COLL VENOUS BLD VENIPUNCTURE: CPT | Performed by: ANESTHESIOLOGY

## 2019-12-02 PROCEDURE — 93010 ELECTROCARDIOGRAM REPORT: CPT | Performed by: INTERNAL MEDICINE

## 2019-12-02 RX ORDER — LABETALOL 20 MG/4 ML (5 MG/ML) INTRAVENOUS SYRINGE
10
Status: DISCONTINUED | OUTPATIENT
Start: 2019-12-02 | End: 2019-12-02 | Stop reason: HOSPADM

## 2019-12-02 RX ORDER — BUPIVACAINE HYDROCHLORIDE 5 MG/ML
INJECTION, SOLUTION PERINEURAL PRN
Status: DISCONTINUED | OUTPATIENT
Start: 2019-12-02 | End: 2019-12-02 | Stop reason: HOSPADM

## 2019-12-02 RX ORDER — IBUPROFEN 600 MG/1
600 TABLET, FILM COATED ORAL
Status: COMPLETED | OUTPATIENT
Start: 2019-12-02 | End: 2019-12-02

## 2019-12-02 RX ORDER — ONDANSETRON 4 MG/1
4 TABLET, ORALLY DISINTEGRATING ORAL
Status: DISCONTINUED | OUTPATIENT
Start: 2019-12-02 | End: 2019-12-02 | Stop reason: HOSPADM

## 2019-12-02 RX ORDER — ONDANSETRON 2 MG/ML
INJECTION INTRAMUSCULAR; INTRAVENOUS PRN
Status: DISCONTINUED | OUTPATIENT
Start: 2019-12-02 | End: 2019-12-02

## 2019-12-02 RX ORDER — CEFAZOLIN SODIUM 1 G/3ML
1 INJECTION, POWDER, FOR SOLUTION INTRAMUSCULAR; INTRAVENOUS SEE ADMIN INSTRUCTIONS
Status: DISCONTINUED | OUTPATIENT
Start: 2019-12-02 | End: 2019-12-02 | Stop reason: HOSPADM

## 2019-12-02 RX ORDER — MAGNESIUM HYDROXIDE 1200 MG/15ML
LIQUID ORAL PRN
Status: DISCONTINUED | OUTPATIENT
Start: 2019-12-02 | End: 2019-12-02 | Stop reason: HOSPADM

## 2019-12-02 RX ORDER — MEPERIDINE HYDROCHLORIDE 50 MG/ML
12.5 INJECTION INTRAMUSCULAR; INTRAVENOUS; SUBCUTANEOUS
Status: DISCONTINUED | OUTPATIENT
Start: 2019-12-02 | End: 2019-12-02 | Stop reason: HOSPADM

## 2019-12-02 RX ORDER — NALOXONE HYDROCHLORIDE 0.4 MG/ML
.1-.4 INJECTION, SOLUTION INTRAMUSCULAR; INTRAVENOUS; SUBCUTANEOUS
Status: DISCONTINUED | OUTPATIENT
Start: 2019-12-02 | End: 2019-12-02 | Stop reason: HOSPADM

## 2019-12-02 RX ORDER — PROPOFOL 10 MG/ML
INJECTION, EMULSION INTRAVENOUS PRN
Status: DISCONTINUED | OUTPATIENT
Start: 2019-12-02 | End: 2019-12-02

## 2019-12-02 RX ORDER — HYDRALAZINE HYDROCHLORIDE 20 MG/ML
2.5-5 INJECTION INTRAMUSCULAR; INTRAVENOUS EVERY 10 MIN PRN
Status: DISCONTINUED | OUTPATIENT
Start: 2019-12-02 | End: 2019-12-02 | Stop reason: HOSPADM

## 2019-12-02 RX ORDER — FENTANYL CITRATE 50 UG/ML
25-50 INJECTION, SOLUTION INTRAMUSCULAR; INTRAVENOUS
Status: DISCONTINUED | OUTPATIENT
Start: 2019-12-02 | End: 2019-12-02 | Stop reason: HOSPADM

## 2019-12-02 RX ORDER — OXYCODONE HYDROCHLORIDE 5 MG/1
5 TABLET ORAL EVERY 4 HOURS PRN
Qty: 15 TABLET | Refills: 0 | Status: ON HOLD | OUTPATIENT
Start: 2019-12-02 | End: 2020-03-24

## 2019-12-02 RX ORDER — DEXAMETHASONE SODIUM PHOSPHATE 4 MG/ML
INJECTION, SOLUTION INTRA-ARTICULAR; INTRALESIONAL; INTRAMUSCULAR; INTRAVENOUS; SOFT TISSUE PRN
Status: DISCONTINUED | OUTPATIENT
Start: 2019-12-02 | End: 2019-12-02

## 2019-12-02 RX ORDER — FENTANYL CITRATE 50 UG/ML
INJECTION, SOLUTION INTRAMUSCULAR; INTRAVENOUS PRN
Status: DISCONTINUED | OUTPATIENT
Start: 2019-12-02 | End: 2019-12-02

## 2019-12-02 RX ORDER — SODIUM CHLORIDE, SODIUM LACTATE, POTASSIUM CHLORIDE, CALCIUM CHLORIDE 600; 310; 30; 20 MG/100ML; MG/100ML; MG/100ML; MG/100ML
INJECTION, SOLUTION INTRAVENOUS CONTINUOUS
Status: DISCONTINUED | OUTPATIENT
Start: 2019-12-02 | End: 2019-12-02 | Stop reason: HOSPADM

## 2019-12-02 RX ORDER — HYDROMORPHONE HYDROCHLORIDE 1 MG/ML
.3-.5 INJECTION, SOLUTION INTRAMUSCULAR; INTRAVENOUS; SUBCUTANEOUS EVERY 10 MIN PRN
Status: DISCONTINUED | OUTPATIENT
Start: 2019-12-02 | End: 2019-12-02 | Stop reason: HOSPADM

## 2019-12-02 RX ORDER — NICOTINE POLACRILEX 4 MG/1
20 GUM, CHEWING ORAL DAILY
COMMUNITY

## 2019-12-02 RX ORDER — ACETAMINOPHEN 500 MG
1000 TABLET ORAL EVERY 6 HOURS PRN
Status: ON HOLD | COMMUNITY
End: 2019-12-02

## 2019-12-02 RX ORDER — PROPOFOL 10 MG/ML
INJECTION, EMULSION INTRAVENOUS CONTINUOUS PRN
Status: DISCONTINUED | OUTPATIENT
Start: 2019-12-02 | End: 2019-12-02

## 2019-12-02 RX ORDER — PHENYLEPHRINE HYDROCHLORIDE 10 MG/ML
INJECTION INTRAVENOUS PRN
Status: DISCONTINUED | OUTPATIENT
Start: 2019-12-02 | End: 2019-12-02

## 2019-12-02 RX ORDER — OXYCODONE HYDROCHLORIDE 5 MG/1
5 TABLET ORAL
Status: COMPLETED | OUTPATIENT
Start: 2019-12-02 | End: 2019-12-02

## 2019-12-02 RX ORDER — LIDOCAINE 40 MG/G
CREAM TOPICAL
Status: DISCONTINUED | OUTPATIENT
Start: 2019-12-02 | End: 2019-12-02 | Stop reason: HOSPADM

## 2019-12-02 RX ORDER — IBUPROFEN 600 MG/1
600 TABLET, FILM COATED ORAL EVERY 6 HOURS
Qty: 40 TABLET | Refills: 1 | Status: ON HOLD | OUTPATIENT
Start: 2019-12-02 | End: 2020-03-24

## 2019-12-02 RX ORDER — HYDROXYZINE PAMOATE 50 MG/1
50 CAPSULE ORAL 3 TIMES DAILY PRN
Qty: 30 CAPSULE | Refills: 0 | Status: ON HOLD | OUTPATIENT
Start: 2019-12-02 | End: 2020-03-24

## 2019-12-02 RX ORDER — DIMENHYDRINATE 50 MG/ML
25 INJECTION, SOLUTION INTRAMUSCULAR; INTRAVENOUS
Status: DISCONTINUED | OUTPATIENT
Start: 2019-12-02 | End: 2019-12-02 | Stop reason: HOSPADM

## 2019-12-02 RX ORDER — CEFAZOLIN SODIUM 2 G/100ML
2 INJECTION, SOLUTION INTRAVENOUS
Status: COMPLETED | OUTPATIENT
Start: 2019-12-02 | End: 2019-12-02

## 2019-12-02 RX ORDER — ACETAMINOPHEN 325 MG/1
975 TABLET ORAL EVERY 6 HOURS
Qty: 50 TABLET | Refills: 1 | Status: SHIPPED | OUTPATIENT
Start: 2019-12-02

## 2019-12-02 RX ORDER — ONDANSETRON 2 MG/ML
4 INJECTION INTRAMUSCULAR; INTRAVENOUS EVERY 30 MIN PRN
Status: DISCONTINUED | OUTPATIENT
Start: 2019-12-02 | End: 2019-12-02 | Stop reason: HOSPADM

## 2019-12-02 RX ORDER — GABAPENTIN 300 MG/1
300 CAPSULE ORAL 3 TIMES DAILY
Qty: 9 CAPSULE | Refills: 0 | Status: ON HOLD | OUTPATIENT
Start: 2019-12-02 | End: 2020-03-24

## 2019-12-02 RX ORDER — GLYCOPYRROLATE 0.2 MG/ML
INJECTION, SOLUTION INTRAMUSCULAR; INTRAVENOUS PRN
Status: DISCONTINUED | OUTPATIENT
Start: 2019-12-02 | End: 2019-12-02

## 2019-12-02 RX ORDER — ONDANSETRON 4 MG/1
4 TABLET, ORALLY DISINTEGRATING ORAL EVERY 30 MIN PRN
Status: DISCONTINUED | OUTPATIENT
Start: 2019-12-02 | End: 2019-12-02 | Stop reason: HOSPADM

## 2019-12-02 RX ORDER — HYDROXYZINE HYDROCHLORIDE 25 MG/1
25 TABLET, FILM COATED ORAL
Status: DISCONTINUED | OUTPATIENT
Start: 2019-12-02 | End: 2019-12-02 | Stop reason: HOSPADM

## 2019-12-02 RX ADMIN — HYDROMORPHONE HYDROCHLORIDE 0.5 MG: 1 INJECTION, SOLUTION INTRAMUSCULAR; INTRAVENOUS; SUBCUTANEOUS at 17:25

## 2019-12-02 RX ADMIN — PHENYLEPHRINE HYDROCHLORIDE 100 MCG: 10 INJECTION INTRAVENOUS at 15:43

## 2019-12-02 RX ADMIN — SODIUM CHLORIDE, POTASSIUM CHLORIDE, SODIUM LACTATE AND CALCIUM CHLORIDE: 600; 310; 30; 20 INJECTION, SOLUTION INTRAVENOUS at 17:32

## 2019-12-02 RX ADMIN — OXYCODONE HYDROCHLORIDE 5 MG: 5 TABLET ORAL at 17:57

## 2019-12-02 RX ADMIN — SODIUM CHLORIDE, POTASSIUM CHLORIDE, SODIUM LACTATE AND CALCIUM CHLORIDE: 600; 310; 30; 20 INJECTION, SOLUTION INTRAVENOUS at 15:21

## 2019-12-02 RX ADMIN — CEFAZOLIN SODIUM 2 G: 2 INJECTION, SOLUTION INTRAVENOUS at 15:26

## 2019-12-02 RX ADMIN — PHENYLEPHRINE HYDROCHLORIDE 100 MCG: 10 INJECTION INTRAVENOUS at 16:18

## 2019-12-02 RX ADMIN — SODIUM CHLORIDE, POTASSIUM CHLORIDE, SODIUM LACTATE AND CALCIUM CHLORIDE: 600; 310; 30; 20 INJECTION, SOLUTION INTRAVENOUS at 16:42

## 2019-12-02 RX ADMIN — LIDOCAINE HYDROCHLORIDE 50 MG: 10 INJECTION, SOLUTION EPIDURAL; INFILTRATION; INTRACAUDAL; PERINEURAL at 15:32

## 2019-12-02 RX ADMIN — PHENYLEPHRINE HYDROCHLORIDE 100 MCG: 10 INJECTION INTRAVENOUS at 16:25

## 2019-12-02 RX ADMIN — PROPOFOL 200 MG: 10 INJECTION, EMULSION INTRAVENOUS at 15:32

## 2019-12-02 RX ADMIN — PROPOFOL 50 MCG/KG/MIN: 10 INJECTION, EMULSION INTRAVENOUS at 15:35

## 2019-12-02 RX ADMIN — DEXAMETHASONE SODIUM PHOSPHATE 4 MG: 4 INJECTION, SOLUTION INTRA-ARTICULAR; INTRALESIONAL; INTRAMUSCULAR; INTRAVENOUS; SOFT TISSUE at 15:32

## 2019-12-02 RX ADMIN — HYDROMORPHONE HYDROCHLORIDE 0.5 MG: 1 INJECTION, SOLUTION INTRAMUSCULAR; INTRAVENOUS; SUBCUTANEOUS at 17:14

## 2019-12-02 RX ADMIN — FENTANYL CITRATE 100 MCG: 50 INJECTION, SOLUTION INTRAMUSCULAR; INTRAVENOUS at 15:32

## 2019-12-02 RX ADMIN — Medication 100 MG: at 15:32

## 2019-12-02 RX ADMIN — IBUPROFEN 600 MG: 600 TABLET, FILM COATED ORAL at 18:34

## 2019-12-02 RX ADMIN — GLYCOPYRROLATE 0.2 MG: 0.2 INJECTION, SOLUTION INTRAMUSCULAR; INTRAVENOUS at 15:32

## 2019-12-02 RX ADMIN — FENTANYL CITRATE 50 MCG: 50 INJECTION, SOLUTION INTRAMUSCULAR; INTRAVENOUS at 17:38

## 2019-12-02 RX ADMIN — ONDANSETRON HYDROCHLORIDE 4 MG: 2 INJECTION, SOLUTION INTRAVENOUS at 15:32

## 2019-12-02 RX ADMIN — Medication 4 MG: at 15:32

## 2019-12-02 RX ADMIN — MIDAZOLAM 2 MG: 1 INJECTION INTRAMUSCULAR; INTRAVENOUS at 15:26

## 2019-12-02 ASSESSMENT — LIFESTYLE VARIABLES: TOBACCO_USE: 0

## 2019-12-02 ASSESSMENT — ENCOUNTER SYMPTOMS
SEIZURES: 0
STRIDOR: 0
DYSRHYTHMIAS: 0

## 2019-12-02 ASSESSMENT — COPD QUESTIONNAIRES: COPD: 0

## 2019-12-02 ASSESSMENT — MIFFLIN-ST. JEOR: SCORE: 1751.99

## 2019-12-02 NOTE — ANESTHESIA PREPROCEDURE EVALUATION
Anesthesia Pre-Procedure Evaluation    Patient: Encoh Barrios   MRN: [id number] : 1980          Preoperative Diagnosis: Lisfranc dislocation, right, initial encounter [S93.324A]    Procedure(s):  Open reduction internal fixation right LisFranc fracture with third tarsometatarsal arthrodesis    Past Medical History:   Diagnosis Date     Abnormal Pap smear     colposcopy     Chronic kidney disease      Gastroesophageal reflux disease      Hypertension      Thyroid disease     on thyroid meds/hypothyroid     Past Surgical History:   Procedure Laterality Date     CYSTOSCOPY, RETROGRADES, INSERT STENT URETER(S), COMBINED Right 9/3/2015    Procedure: COMBINED CYSTOSCOPY, RETROGRADES, INSERT STENT URETER(S);  Surgeon: Joseph Dangelo MD;  Location: [facility] OR     DILATION AND CURETTAGE SUCTION N/A 2016    Procedure: DILATION AND CURETTAGE SUCTION;  Surgeon: Albert Hammer MD;  Location: [facility] OR     Anesthesia Evaluation     . Pt has had prior anesthetic. Type: General    No history of anesthetic complications          ROS/MED HX    ENT/Pulmonary:     (+)ANICETO risk factors hypertension, obese, , recent URI unresolved low grade fever, nonproductive occassional cough,  not toxic lokking or feeling poorly: . .   (-) tobacco use, asthma and COPD   Neurologic:  - neg neurologic ROS    (-) seizures and CVA   Cardiovascular:     (+) hypertension----. : . . . :. .      (-) CAD, arrhythmias and valvular problems/murmurs   METS/Exercise Tolerance:     Hematologic: Comments: Hgb 11.4  K 3.9  Cr 1.80 - neg hematologic  ROS       Musculoskeletal:  - neg musculoskeletal ROS       GI/Hepatic:     (+) GERD Asymptomatic on medication,       Renal/Genitourinary:     (+) chronic renal disease, type: CRI, Nephrolithiasis , Pt does not require dialysis,       Endo:  - neg endo ROS   (+) thyroid problem hypothyroidism, Obesity, .   (-) Type I DM, Type II DM and chronic steroid usage   Psychiatric:  - neg psychiatric ROS      "  Infectious Disease:  - neg infectious disease ROS       Malignancy:      - no malignancy   Other:    - neg other ROS                      Physical Exam  Normal systems: cardiovascular, pulmonary and dental    Airway   Mallampati: III  TM distance: >3 FB  Neck ROM: full    Dental     Cardiovascular   Rhythm and rate: regular and normal  (-) no friction rub, no systolic click and no murmur    Pulmonary    breath sounds clear to auscultation(-) no rhonchi, no decreased breath sounds, no wheezes, no rales and no stridor            Lab Results   Component Value Date    WBC 9.4 03/01/2018    HGB 11.9 03/01/2018    HCT 36.9 03/01/2018     03/01/2018     03/01/2018    POTASSIUM 4.3 03/01/2018    CHLORIDE 100 03/01/2018    CO2 28 03/01/2018    BUN 18 03/01/2018    CR 1.40 (H) 03/01/2018     (H) 03/01/2018    YENY 9.1 03/01/2018    MAG 1.6 09/03/2015    ALBUMIN 3.9 03/01/2018    PROTTOTAL 8.0 03/01/2018    ALT 43 03/01/2018    AST 48 (H) 03/01/2018    ALKPHOS 105 03/01/2018    BILITOTAL 0.7 03/01/2018    BILIDIRECT 0.1 04/23/2012    LIPASE 203 10/22/2017    INR 0.92 05/07/2016    T4 0.5 (L) 08/17/2012    HCG Negative 03/01/2018    HCGS Negative 02/14/2017       Preop Vitals  BP Readings from Last 3 Encounters:   11/24/19 (!) 114/90   03/02/18 122/77   10/22/17 (!) 137/101    Pulse Readings from Last 3 Encounters:   11/24/19 84   03/01/18 96   10/22/17 92      Resp Readings from Last 3 Encounters:   11/24/19 20   03/01/18 18   10/22/17 18    SpO2 Readings from Last 3 Encounters:   11/24/19 99%   03/02/18 100%   10/22/17 98%      Temp Readings from Last 1 Encounters:   11/24/19 97.5  F (36.4  C) (Temporal)    Ht Readings from Last 1 Encounters:   12/02/19 1.676 m (5' 5.98\")      Wt Readings from Last 1 Encounters:   12/02/19 107 kg (236 lb)    Estimated body mass index is 38.11 kg/m  as calculated from the following:    Height as of this encounter: 1.676 m (5' 5.98\").    Weight as of this encounter: 107 kg " (236 lb).       Anesthesia Plan      History & Physical Review  History and physical reviewed and following examination; no interval change.    ASA Status:  2 .    NPO Status:  > 8 hours    Plan for General and LMA with Intravenous induction.   PONV prophylaxis:  Ondansetron (or other 5HT-3) and Dexamethasone or Solumedrol  Discussed the ill-defined increased risk of pulmonary complications with patient and she wants to proceed.      Postoperative Care  Postoperative pain management:  IV analgesics.      Consents  Anesthetic plan, risks, benefits and alternatives discussed with:  Patient or representative and Patient..                 Scott Browning MD                    .

## 2019-12-02 NOTE — PROVIDER NOTIFICATION
Febrile.  Pt states infrequent, non productive cough.  A couple recent episodes of diarhhea the last week.  Dr. Browning notified.  PIV insertion delayed until speaking with Dr. Skelton.

## 2019-12-02 NOTE — ANESTHESIA CARE TRANSFER NOTE
Patient: Enoch Barrios    Procedure(s):  Open reduction internal fixation right LisFranc fracture, Open reduction internal fixation right Third Metatarsal Base    Diagnosis: Lisfranc dislocation, right, initial encounter [S93.324A]  Diagnosis Additional Information: No value filed.    Anesthesia Type:   General, LMA     Note:  Airway :Face Mask  Patient transferred to:PACU  Comments: VSS.  Spontaneously breathing O2 per open face mask.  Report given to RN.Handoff Report: Identifed the Patient, Identified the Reponsible Provider, Reviewed the pertinent medical history, Discussed the surgical course, Reviewed Intra-OP anesthesia mangement and issues during anesthesia, Set expectations for post-procedure period and Allowed opportunity for questions and acknowledgement of understanding      Vitals: (Last set prior to Anesthesia Care Transfer)    CRNA VITALS  12/2/2019 1631 - 12/2/2019 1709      12/2/2019             Pulse:  97    SpO2:  93 %    Resp Rate (observed):  (!) 2                Electronically Signed By: MARY Maurice CRNA  December 2, 2019  5:09 PM

## 2019-12-02 NOTE — BRIEF OP NOTE
[facility]    Brief Operative Note    Pre-operative diagnosis: Lisfranc fractue-dislocation, right, initial encounter   Post-operative diagnosis Same as pre-operative diagnosis    Procedure: Procedure(s):  Open reduction internal fixation right LisFranc fracture, Open reduction internal fixation right Third Metatarsal Base  Surgeon: Surgeon(s) and Role:     * Monroe Skelton MD - Primary     * Dbebie Garcia PA-C - Assisting  Anesthesia: General   Estimated blood loss: Minimal  Drains: None  Specimens: * No specimens in log *  Findings:   None.  Complications: None.  Implants:   Implant Name Type Inv. Item Serial No.  Lot No. LRB No. Used   Mini-Monster Screw 3.5 x 42mm, Short Threaded    PARAGON 28 LOAD [id number] 28NOV 2019 Right 1   Mini-Monster Screw 3.5 x 36mm, Short Threaded    [employer] 28 LOAD [id number] 28NOV 2019 Right 1   Mini-Monster 3.5 x 26mm, Short Threaded    [employer]ON 28 LOAD [id number] 28NOV 2019 Right 1   Mini-Monster 3.0 x 14mm Full Threaded    [employer]ON 28 LOAD [id number] 28NOV 2019 Right 2

## 2019-12-03 NOTE — DISCHARGE INSTRUCTIONS
HOME CARE FOLLOWING MINOR SURGERY        DRESSING  Keep dressing dry and in place until your doctor instructs you to remove the dressing.    DRAINAGE  There should be minimal drainage. If bleeding should occur and soaks through the dressing apply a sterile, dry dressing over it and tape in place. If bleeding persists, apply gentle, steady pressure with your hand over the dressing for 5 minutes. If the bleeding does not stop, call your doctor.    SKIN CLOSURE  You may have stitches or special skin closures. You will be given an appointment for the removal of any external stitches. You may have stitches under the skin which will absorb. You may have steri-strips over the incision. These look like thin tapes and will peel off in 5-7 days. If they don t after 7 days, you may carefully remove them. You may shower with the steri-strips but do not soak them, as with swimming or taking a bath. A protective covering of plastic may be placed over external stitches for showering.    NOTIFY YOUR PHYSICIAN IF YOU HAVE ANY OF THE FOLLOWING SYMPTOMS:    1. Fever greater than 101 degrees  2. Excessive bleeding or drainage  3. Disruption of the skin closure  4. Swelling, redness or excessive tenderness at the site  5. Severe pain  6. Drainage that is green, yellow, thick white or has a bad odor          GENERAL ANESTHESIA OR SEDATION ADULT DISCHARGE INSTRUCTIONS   SPECIAL PRECAUTIONS FOR 24 HOURS AFTER SURGERY    IT IS NOT UNUSUAL TO FEEL LIGHT-HEADED OR FAINT, UP TO 24 HOURS AFTER SURGERY OR WHILE TAKING PAIN MEDICATION.  IF YOU HAVE THESE SYMPTOMS; SIT FOR A FEW MINUTES BEFORE STANDING AND HAVE SOMEONE ASSIST YOU WHEN YOU GET UP TO WALK OR USE THE BATHROOM.    YOU SHOULD REST AND RELAX FOR THE NEXT 24 HOURS AND YOU MUST MAKE ARRANGEMENTS TO HAVE SOMEONE STAY WITH YOU FOR AT LEAST 24 HOURS AFTER YOUR DISCHARGE.  AVOID HAZARDOUS AND STRENUOUS ACTIVITIES.  DO NOT MAKE IMPORTANT DECISIONS FOR 24 HOURS.    DO NOT DRIVE ANY VEHICLE OR  OPERATE MECHANICAL EQUIPMENT FOR 24 HOURS FOLLOWING THE END OF YOUR SURGERY.  EVEN THOUGH YOU MAY FEEL NORMAL, YOUR REACTIONS MAY BE AFFECTED BY THE MEDICATION YOU HAVE RECEIVED.    DO NOT DRINK ALCOHOLIC BEVERAGES FOR 24 HOURS FOLLOWING YOUR SURGERY.    DRINK CLEAR LIQUIDS (APPLE JUICE, GINGER ALE, 7-UP, BROTH, ETC.).  PROGRESS TO YOUR REGULAR DIET AS YOU FEEL ABLE.    YOU MAY HAVE A DRY MOUTH, A SORE THROAT, MUSCLES ACHES OR TROUBLE SLEEPING.  THESE SHOULD GO AWAY AFTER 24 HOURS.    CALL YOUR DOCTOR FOR ANY OF THE FOLLOWING:  SIGNS OF INFECTION (FEVER, GROWING TENDERNESS AT THE SURGERY SITE, A LARGE AMOUNT OF DRAINAGE OR BLEEDING, SEVERE PAIN, FOUL-SMELLING DRAINAGE, REDNESS OR SWELLING.    IT HAS BEEN OVER 8 TO 10 HOURS SINCE SURGERY AND YOU ARE STILL NOT ABLE TO URINATE (PASS WATER).         Caring For Your Cast     This information was prepared for you to help you take care of your cast. Please read each section carefully and ask your physician if you have questions.    HOW TO CARE FOR YOUR CAST    If your cast is made of plaster of Lisa and it becomes soiled, clean using a damp cloth with dry cleanser or use white shoe polish sparingly. If your cast is made of fiberglass, clean with a damp cloth with a small amount of mild soap.    Avoid bumping or knocking the cast against any hard object. Cover rough areas with tape.    Never trim or cut down the length of your cast. Please call your physician if the cast becomes loose, broken or cracked.    If skin under the cast begins to itch, do not use anything to scratch under the cast since it may break the skin and cause an infection. Parents should be alert to prevent children from sticking forks, sticks or other objects inside the cast.    If your physician orders a cast boot, be sure to wear it.    You may wash areas around the cast, but do not saturate the cast in the process. Some coverings can be used to protect your cast. Please ask you physician to recommend  one.  WHAT TO CHECK FOR    Check your fingers or toes for color changes, swelling, loss of motion, numbness, tingling or severe pain. In infants or young children, check for crying that cannot be soothed by usual methods. Call your physician if these symptoms occur.    If swelling is noted during the first 24 to 48 hours, elevate the extremity higher than your head. After this time, elevate it whenever you are in bed.    Check cast for undesirable odors or drainage. Also check for any areas of local pain under your cast. These may be signs of an area of pressure under the cast.    Be sure any padding used is well anchored to the cast. Be careful not to pull padding out. Without the padding. Loose material may slip into cast and cause irritation.    WHEN TO CALL YOUR PHYSICIAN  You should call IF you notice:    A bluish color, increased swelling, loss of motion, increased numbness/tingling or severe pain of fingers or toes.    Unusually foul odor from cast.    Unusual drainage (blood is expected if there has been surgery).    Broken, cracked or very loose cast.    Localized pain under cast.  CAST REMOVAL AND POST CAST CARE    A specially designed saw will be used to remove your cast. Cast removal is fast and painless.    Use of skin lotion or bathing with bath oil may eliminate some of the dry, flaky skin which is present after your cast is removed. Do not scrub your skin since this may cause skin breakdown.     Elevate your extremity if you notice any swelling after your cast is removed.    Your arm or leg may appear thinner and lighter because you haven t been using it. Your physician will determine how much physical activity is advisable following removal of your cast.    REMEMBER: Cast care must continue as long as your cast is on.        Oxycodone 5 mg at 6:00 pm,  You can take another at 10:00 pm      YOU HAD IBUPROFEN 600MG AT 6:30 PM.  NEXT DOSE DUE AT 12:30 AM.      START TAKING TYLENOL AT 9:30 PM.        MARIA LUISA DOZIER M.D.          CLINIC PHONE NUMBER:  [phone]  [facility]

## 2019-12-03 NOTE — ANESTHESIA POSTPROCEDURE EVALUATION
Patient: Enoch Barrios    Procedure(s):  Open reduction internal fixation right LisFranc fracture, open reduction internal fixation right third metatarsal base fracture    Diagnosis:Lisfranc dislocation, right, initial encounter [S93.324A]  Diagnosis Additional Information: No value filed.    Anesthesia Type:  General, LMA    Note:  Anesthesia Post Evaluation    Patient location during evaluation: PACU  Patient participation: Able to fully participate in evaluation  Level of consciousness: awake  Pain management: adequate  Airway patency: patent  Cardiovascular status: acceptable  Respiratory status: acceptable  Hydration status: acceptable  PONV: none     Anesthetic complications: None          Last vitals:  Vitals:    12/02/19 1825 12/02/19 1900 12/02/19 1930   BP: (!) 140/113 (!) 143/105 (!) 134/103   Pulse:      Resp: 28 16 16   Temp: 98.9  F (37.2  C) 97.2  F (36.2  C) 97  F (36.1  C)   SpO2: 98% 98% 97%         Electronically Signed By: Osiel Kaye MD  December 3, 2019  12:28 PM  
General

## 2019-12-04 LAB — INTERPRETATION ECG - MUSE: NORMAL

## 2019-12-06 NOTE — OP NOTE
Procedure Date: 12/02/2019      PREOPERATIVE DIAGNOSIS:  Lisfranc fracture-dislocation, right foot.      POSTOPERATIVE DIAGNOSIS:  Lisfranc fracture-dislocation, right foot.      PROCEDURES:     1.  Open reduction and internal fixation, right Lisfranc fracture dislocation.   2.  Open reduction and internal fixation, right third metatarsal base fracture.      IMPLANTS:  Henlawson 28, 8 Mini Monster screws (3.5 and 3.0).      SURGEON:  Monroe Skelton MD      ASSISTANT:  Meliton Garcia PA-C      ANESTHESIA:  General plus local.      ESTIMATED BLOOD LOSS:  Minimal.      DRAINS:  None.      SPECIMENS:  None.      COMPLICATIONS:  No complications.      INDICATIONS FOR PROCEDURE:  Enoch Barrios is a 39-year-old woman who suffered a midfoot injury.  X-rays and a CT scan were performed.  She had clear widening of the Lisfranc articulation as well as an injury to the third metatarsal base.  After discussion of risks, benefits and alternatives, she elected to proceed with surgical treatment.      PROCEDURE IN DETAIL AND FINDINGS:  The patient was identified in the preoperative area and appropriately marked.  She was brought to the operating room, where general anesthetic was administered and airway secured without difficulty.  Preoperative antibiotic prophylaxis was provided within an hour of the incision.  Prophylaxis was discontinued the day of surgery.  A tourniquet was placed on the right calf.  The right leg and foot were prepped and draped in sterile fashion.  The limb was elevated for exsanguination and tourniquet inflated.  A pause for the cause was performed.      I began with a dorsal incision over the Lisfranc articulation.  This was carried down, and the joint was exposed.  There was a clear disruption of the dorsal ligament.  I irrigated the hematoma in the area and then reduced the base of the second metatarsal within an AO clamp.  X-rays were taken and confirmed good reduction.  I then secured this with a 3.5 mm  screw.  There was excellent reduction of the medial base fracture fragment of the second metatarsal.      I then made a separate incision over the base of the third metatarsal.  I evaluated the third tarsometatarsal joint.  Cartilage was well maintained.  I was able to reduce the joint and provisionally pin it.  I then elected to fix this rather than fuse the joint.  I placed two 3.5 mm screws that had excellent purchase.      AP, lateral and oblique views of the right foot were taken and reviewed.  Alignment was good.  Instrumentation was stable.      The wounds were irrigated and closed in layers.  Adaptic, sterile dressings and a well-padded slipper cast were applied.      The patient tolerated the procedure well.  There were no complications.  All sponge and needle counts were correct.      PLAN:  She will be nonweightbearing for 2 weeks in her cast.  She will then return for cast off, wound check, sutures out, and placement of a short Aircast boot.  She can then heel weightbear with the use of crutches or a walker.  I will see her back at 6 weeks with AP, lateral, oblique weightbearing views of the right foot and reexamination.  We can certainly discuss removal of the Lisfranc screw at some point if she so chooses.  Given the cannulated nature of the screw and the slight prominence at the lateral aspect of the base of the second metatarsal, I do not think this would have to be removed prophylactically and would be relatively straightforward to remove if it should bring cause her any complication.         MARIA LUISA DOZIER MD             D: 2019   T: 2019   MT: ADAMA      Name:     MARY GUARDADO   MRN:      [id number]        Account:        [id number]   :      1980           Procedure Date: 2019      Document: [id number]

## 2020-03-23 ENCOUNTER — HOSPITAL ENCOUNTER (INPATIENT)
Facility: CLINIC | Age: 40
LOS: 2 days | Discharge: HOME | DRG: 661 | End: 2020-03-26
Attending: PHYSICIAN ASSISTANT | Admitting: INTERNAL MEDICINE
Payer: COMMERCIAL

## 2020-03-23 DIAGNOSIS — N30.01 ACUTE CYSTITIS WITH HEMATURIA: ICD-10-CM

## 2020-03-23 DIAGNOSIS — K59.00 CONSTIPATION, UNSPECIFIED CONSTIPATION TYPE: ICD-10-CM

## 2020-03-23 DIAGNOSIS — N20.0 KIDNEY STONE: Primary | ICD-10-CM

## 2020-03-23 DIAGNOSIS — N20.1 URETERAL STONE: ICD-10-CM

## 2020-03-23 DIAGNOSIS — N20.1 RIGHT URETERAL STONE: ICD-10-CM

## 2020-03-23 LAB
BASOPHILS # BLD AUTO: 0.1 10E9/L (ref 0–0.2)
BASOPHILS NFR BLD AUTO: 0.6 %
DIFFERENTIAL METHOD BLD: ABNORMAL
EOSINOPHIL # BLD AUTO: 0.4 10E9/L (ref 0–0.7)
EOSINOPHIL NFR BLD AUTO: 2.9 %
ERYTHROCYTE [DISTWIDTH] IN BLOOD BY AUTOMATED COUNT: 14.6 % (ref 10–15)
HCT VFR BLD AUTO: 37.4 % (ref 35–47)
HGB BLD-MCNC: 11.7 G/DL (ref 11.7–15.7)
IMM GRANULOCYTES # BLD: 0.2 10E9/L (ref 0–0.4)
IMM GRANULOCYTES NFR BLD: 1.1 %
LYMPHOCYTES # BLD AUTO: 1.3 10E9/L (ref 0.8–5.3)
LYMPHOCYTES NFR BLD AUTO: 8.6 %
MCH RBC QN AUTO: 31 PG (ref 26.5–33)
MCHC RBC AUTO-ENTMCNC: 31.3 G/DL (ref 31.5–36.5)
MCV RBC AUTO: 99 FL (ref 78–100)
MONOCYTES # BLD AUTO: 0.7 10E9/L (ref 0–1.3)
MONOCYTES NFR BLD AUTO: 4.5 %
NEUTROPHILS # BLD AUTO: 12.3 10E9/L (ref 1.6–8.3)
NEUTROPHILS NFR BLD AUTO: 82.3 %
NRBC # BLD AUTO: 0 10*3/UL
NRBC BLD AUTO-RTO: 0 /100
PLATELET # BLD AUTO: 167 10E9/L (ref 150–450)
RBC # BLD AUTO: 3.77 10E12/L (ref 3.8–5.2)
WBC # BLD AUTO: 14.9 10E9/L (ref 4–11)

## 2020-03-23 PROCEDURE — 80076 HEPATIC FUNCTION PANEL: CPT | Performed by: PHYSICIAN ASSISTANT

## 2020-03-23 PROCEDURE — 25000128 H RX IP 250 OP 636: Performed by: PHYSICIAN ASSISTANT

## 2020-03-23 PROCEDURE — 99285 EMERGENCY DEPT VISIT HI MDM: CPT | Mod: 25

## 2020-03-23 PROCEDURE — 85025 COMPLETE CBC W/AUTO DIFF WBC: CPT | Performed by: PHYSICIAN ASSISTANT

## 2020-03-23 PROCEDURE — 96375 TX/PRO/DX INJ NEW DRUG ADDON: CPT

## 2020-03-23 PROCEDURE — 84702 CHORIONIC GONADOTROPIN TEST: CPT

## 2020-03-23 PROCEDURE — 83690 ASSAY OF LIPASE: CPT | Performed by: PHYSICIAN ASSISTANT

## 2020-03-23 PROCEDURE — 80048 BASIC METABOLIC PNL TOTAL CA: CPT | Performed by: PHYSICIAN ASSISTANT

## 2020-03-23 RX ORDER — HYDROMORPHONE HYDROCHLORIDE 1 MG/ML
0.5 INJECTION, SOLUTION INTRAMUSCULAR; INTRAVENOUS; SUBCUTANEOUS ONCE
Status: COMPLETED | OUTPATIENT
Start: 2020-03-23 | End: 2020-03-23

## 2020-03-23 RX ORDER — ONDANSETRON 2 MG/ML
4 INJECTION INTRAMUSCULAR; INTRAVENOUS ONCE
Status: COMPLETED | OUTPATIENT
Start: 2020-03-23 | End: 2020-03-23

## 2020-03-23 RX ADMIN — HYDROMORPHONE HYDROCHLORIDE 0.5 MG: 1 INJECTION, SOLUTION INTRAMUSCULAR; INTRAVENOUS; SUBCUTANEOUS at 23:50

## 2020-03-23 RX ADMIN — ONDANSETRON 4 MG: 2 INJECTION INTRAMUSCULAR; INTRAVENOUS at 23:50

## 2020-03-23 ASSESSMENT — ENCOUNTER SYMPTOMS
FEVER: 0
COUGH: 0
FLANK PAIN: 1
SHORTNESS OF BREATH: 0
DYSURIA: 0
BACK PAIN: 1

## 2020-03-23 NOTE — LETTER
[place] [address] MEDICAL SUR[facility]ICAL  [address] [facility] [address]  [phone] March 26, 2020    RE:  Enoch Barrios                                                                                                                                                       [address] [facility] [address]            To whom it may concern:    Enoch Barrios was hospitalized from 3/24/2020-03/26/20.  She remains at increased risk for infection and should not work for the next two weeks.      Sincerely,            Yajaira García MD

## 2020-03-24 ENCOUNTER — ANESTHESIA EVENT (OUTPATIENT)
Dept: SURGERY | Facility: CLINIC | Age: 40
DRG: 661 | End: 2020-03-24
Payer: COMMERCIAL

## 2020-03-24 ENCOUNTER — PREP FOR PROCEDURE (OUTPATIENT)
Dept: UROLOGY | Facility: CLINIC | Age: 40
End: 2020-03-24

## 2020-03-24 ENCOUNTER — APPOINTMENT (OUTPATIENT)
Dept: ULTRASOUND IMAGING | Facility: CLINIC | Age: 40
DRG: 661 | End: 2020-03-24
Attending: PHYSICIAN ASSISTANT
Payer: COMMERCIAL

## 2020-03-24 ENCOUNTER — APPOINTMENT (OUTPATIENT)
Dept: GENERAL RADIOLOGY | Facility: CLINIC | Age: 40
DRG: 661 | End: 2020-03-24
Attending: INTERNAL MEDICINE
Payer: COMMERCIAL

## 2020-03-24 ENCOUNTER — ANESTHESIA (OUTPATIENT)
Dept: SURGERY | Facility: CLINIC | Age: 40
DRG: 661 | End: 2020-03-24
Payer: COMMERCIAL

## 2020-03-24 ENCOUNTER — APPOINTMENT (OUTPATIENT)
Dept: CT IMAGING | Facility: CLINIC | Age: 40
DRG: 661 | End: 2020-03-24
Attending: PHYSICIAN ASSISTANT
Payer: COMMERCIAL

## 2020-03-24 DIAGNOSIS — N30.01 ACUTE CYSTITIS WITH HEMATURIA: Primary | ICD-10-CM

## 2020-03-24 DIAGNOSIS — N20.1 RIGHT URETERAL STONE: ICD-10-CM

## 2020-03-24 PROBLEM — N20.0 KIDNEY STONE: Status: ACTIVE | Noted: 2020-03-24

## 2020-03-24 LAB
ALBUMIN SERPL-MCNC: 4.3 G/DL (ref 3.4–5)
ALBUMIN UR-MCNC: 50 MG/DL
ALP SERPL-CCNC: 179 U/L (ref 40–150)
ALT SERPL W P-5'-P-CCNC: 75 U/L (ref 0–50)
AMORPH CRY #/AREA URNS HPF: ABNORMAL /HPF
ANION GAP SERPL CALCULATED.3IONS-SCNC: 7 MMOL/L (ref 3–14)
APPEARANCE UR: ABNORMAL
AST SERPL W P-5'-P-CCNC: 68 U/L (ref 0–45)
B-HCG FREE SERPL-ACNC: <5 IU/L
BACTERIA #/AREA URNS HPF: ABNORMAL /HPF
BILIRUB DIRECT SERPL-MCNC: 0.1 MG/DL (ref 0–0.2)
BILIRUB SERPL-MCNC: 0.8 MG/DL (ref 0.2–1.3)
BILIRUB UR QL STRIP: NEGATIVE
BUN SERPL-MCNC: 18 MG/DL (ref 7–30)
CALCIUM SERPL-MCNC: 9.4 MG/DL (ref 8.5–10.1)
CHLORIDE SERPL-SCNC: 100 MMOL/L (ref 94–109)
CO2 SERPL-SCNC: 26 MMOL/L (ref 20–32)
COLOR UR AUTO: YELLOW
CREAT SERPL-MCNC: 1.65 MG/DL (ref 0.52–1.04)
GFR SERPL CREATININE-BSD FRML MDRD: 38 ML/MIN/{1.73_M2}
GLUCOSE SERPL-MCNC: 133 MG/DL (ref 70–99)
GLUCOSE UR STRIP-MCNC: NEGATIVE MG/DL
HCG UR QL: NEGATIVE
HGB UR QL STRIP: ABNORMAL
KETONES UR STRIP-MCNC: NEGATIVE MG/DL
LACTATE BLD-SCNC: 2 MMOL/L (ref 0.7–2)
LEUKOCYTE ESTERASE UR QL STRIP: ABNORMAL
LIPASE SERPL-CCNC: 200 U/L (ref 73–393)
MUCOUS THREADS #/AREA URNS LPF: PRESENT /LPF
NITRATE UR QL: POSITIVE
PH UR STRIP: 5.5 PH (ref 5–7)
POTASSIUM SERPL-SCNC: 4.1 MMOL/L (ref 3.4–5.3)
PROT SERPL-MCNC: 8.1 G/DL (ref 6.8–8.8)
RBC #/AREA URNS AUTO: 55 /HPF (ref 0–2)
SODIUM SERPL-SCNC: 133 MMOL/L (ref 133–144)
SOURCE: ABNORMAL
SP GR UR STRIP: 1.02 (ref 1–1.03)
SQUAMOUS #/AREA URNS AUTO: 17 /HPF (ref 0–1)
UROBILINOGEN UR STRIP-MCNC: NORMAL MG/DL (ref 0–2)
WBC #/AREA URNS AUTO: >182 /HPF (ref 0–5)
WBC CLUMPS #/AREA URNS HPF: PRESENT /HPF

## 2020-03-24 PROCEDURE — 25800030 ZZH RX IP 258 OP 636: Performed by: INTERNAL MEDICINE

## 2020-03-24 PROCEDURE — 25000128 H RX IP 250 OP 636: Performed by: UROLOGY

## 2020-03-24 PROCEDURE — 25000128 H RX IP 250 OP 636: Performed by: INTERNAL MEDICINE

## 2020-03-24 PROCEDURE — 25000128 H RX IP 250 OP 636: Performed by: NURSE ANESTHETIST, CERTIFIED REGISTERED

## 2020-03-24 PROCEDURE — 36000052 ZZH SURGERY LEVEL 2 EA 15 ADDTL MIN: Performed by: UROLOGY

## 2020-03-24 PROCEDURE — 71000013 ZZH RECOVERY PHASE 1 LEVEL 1 EA ADDTL HR: Performed by: UROLOGY

## 2020-03-24 PROCEDURE — 74176 CT ABD & PELVIS W/O CONTRAST: CPT

## 2020-03-24 PROCEDURE — 25000125 ZZHC RX 250: Performed by: NURSE ANESTHETIST, CERTIFIED REGISTERED

## 2020-03-24 PROCEDURE — 36415 COLL VENOUS BLD VENIPUNCTURE: CPT | Performed by: PHYSICIAN ASSISTANT

## 2020-03-24 PROCEDURE — C1877 STENT, NON-COAT/COV W/O DEL: HCPCS | Performed by: UROLOGY

## 2020-03-24 PROCEDURE — 40000277 XR SURGERY CARM FLUORO LESS THAN 5 MIN W STILLS: Mod: TC

## 2020-03-24 PROCEDURE — 36000054 ZZH SURGERY LEVEL 2 W FLUORO 1ST 30 MIN: Performed by: UROLOGY

## 2020-03-24 PROCEDURE — 25000125 ZZHC RX 250: Performed by: INTERNAL MEDICINE

## 2020-03-24 PROCEDURE — 99232 SBSQ HOSP IP/OBS MODERATE 35: CPT | Mod: 25 | Performed by: UROLOGY

## 2020-03-24 PROCEDURE — 71000012 ZZH RECOVERY PHASE 1 LEVEL 1 FIRST HR: Performed by: UROLOGY

## 2020-03-24 PROCEDURE — 96365 THER/PROPH/DIAG IV INF INIT: CPT

## 2020-03-24 PROCEDURE — 40000306 ZZH STATISTIC PRE PROC ASSESS II: Performed by: UROLOGY

## 2020-03-24 PROCEDURE — 99207 ZZC APP CREDIT; MD BILLING SHARED VISIT: CPT | Performed by: INTERNAL MEDICINE

## 2020-03-24 PROCEDURE — 0T768DZ DILATION OF RIGHT URETER WITH INTRALUMINAL DEVICE, VIA NATURAL OR ARTIFICIAL OPENING ENDOSCOPIC: ICD-10-PCS | Performed by: UROLOGY

## 2020-03-24 PROCEDURE — 37000009 ZZH ANESTHESIA TECHNICAL FEE, EACH ADDTL 15 MIN: Performed by: UROLOGY

## 2020-03-24 PROCEDURE — C1769 GUIDE WIRE: HCPCS | Performed by: UROLOGY

## 2020-03-24 PROCEDURE — 27210794 ZZH OR GENERAL SUPPLY STERILE: Performed by: UROLOGY

## 2020-03-24 PROCEDURE — 25000125 ZZHC RX 250: Performed by: UROLOGY

## 2020-03-24 PROCEDURE — 81025 URINE PREGNANCY TEST: CPT | Performed by: ANESTHESIOLOGY

## 2020-03-24 PROCEDURE — 25000128 H RX IP 250 OP 636: Performed by: PHYSICIAN ASSISTANT

## 2020-03-24 PROCEDURE — 87088 URINE BACTERIA CULTURE: CPT | Performed by: PHYSICIAN ASSISTANT

## 2020-03-24 PROCEDURE — 87186 SC STD MICRODIL/AGAR DIL: CPT | Performed by: PHYSICIAN ASSISTANT

## 2020-03-24 PROCEDURE — G0378 HOSPITAL OBSERVATION PER HR: HCPCS

## 2020-03-24 PROCEDURE — 25000132 ZZH RX MED GY IP 250 OP 250 PS 637: Performed by: INTERNAL MEDICINE

## 2020-03-24 PROCEDURE — 25800030 ZZH RX IP 258 OP 636: Performed by: PHYSICIAN ASSISTANT

## 2020-03-24 PROCEDURE — 96376 TX/PRO/DX INJ SAME DRUG ADON: CPT

## 2020-03-24 PROCEDURE — 25800030 ZZH RX IP 258 OP 636: Performed by: NURSE ANESTHETIST, CERTIFIED REGISTERED

## 2020-03-24 PROCEDURE — 12000000 ZZH R&B MED SURG/OB

## 2020-03-24 PROCEDURE — 99223 1ST HOSP IP/OBS HIGH 75: CPT | Mod: AI | Performed by: INTERNAL MEDICINE

## 2020-03-24 PROCEDURE — 81001 URINALYSIS AUTO W/SCOPE: CPT | Performed by: PHYSICIAN ASSISTANT

## 2020-03-24 PROCEDURE — 76705 ECHO EXAM OF ABDOMEN: CPT

## 2020-03-24 PROCEDURE — 83605 ASSAY OF LACTIC ACID: CPT | Performed by: PHYSICIAN ASSISTANT

## 2020-03-24 PROCEDURE — 37000008 ZZH ANESTHESIA TECHNICAL FEE, 1ST 30 MIN: Performed by: UROLOGY

## 2020-03-24 PROCEDURE — 52332 CYSTOSCOPY AND TREATMENT: CPT | Mod: RT | Performed by: UROLOGY

## 2020-03-24 PROCEDURE — 87086 URINE CULTURE/COLONY COUNT: CPT | Performed by: PHYSICIAN ASSISTANT

## 2020-03-24 RX ORDER — PROCHLORPERAZINE 25 MG
25 SUPPOSITORY, RECTAL RECTAL EVERY 12 HOURS PRN
Status: DISCONTINUED | OUTPATIENT
Start: 2020-03-24 | End: 2020-03-26 | Stop reason: HOSPADM

## 2020-03-24 RX ORDER — ONDANSETRON 2 MG/ML
4 INJECTION INTRAMUSCULAR; INTRAVENOUS EVERY 6 HOURS PRN
Status: DISCONTINUED | OUTPATIENT
Start: 2020-03-24 | End: 2020-03-26 | Stop reason: HOSPADM

## 2020-03-24 RX ORDER — NALOXONE HYDROCHLORIDE 0.4 MG/ML
.1-.4 INJECTION, SOLUTION INTRAMUSCULAR; INTRAVENOUS; SUBCUTANEOUS
Status: DISCONTINUED | OUTPATIENT
Start: 2020-03-24 | End: 2020-03-24 | Stop reason: HOSPADM

## 2020-03-24 RX ORDER — ONDANSETRON 4 MG/1
4 TABLET, ORALLY DISINTEGRATING ORAL EVERY 30 MIN PRN
Status: DISCONTINUED | OUTPATIENT
Start: 2020-03-24 | End: 2020-03-24 | Stop reason: HOSPADM

## 2020-03-24 RX ORDER — NALOXONE HYDROCHLORIDE 0.4 MG/ML
.1-.4 INJECTION, SOLUTION INTRAMUSCULAR; INTRAVENOUS; SUBCUTANEOUS
Status: DISCONTINUED | OUTPATIENT
Start: 2020-03-24 | End: 2020-03-26 | Stop reason: HOSPADM

## 2020-03-24 RX ORDER — ONDANSETRON 2 MG/ML
INJECTION INTRAMUSCULAR; INTRAVENOUS PRN
Status: DISCONTINUED | OUTPATIENT
Start: 2020-03-24 | End: 2020-03-24

## 2020-03-24 RX ORDER — DEXAMETHASONE SODIUM PHOSPHATE 4 MG/ML
INJECTION, SOLUTION INTRA-ARTICULAR; INTRALESIONAL; INTRAMUSCULAR; INTRAVENOUS; SOFT TISSUE PRN
Status: DISCONTINUED | OUTPATIENT
Start: 2020-03-24 | End: 2020-03-24

## 2020-03-24 RX ORDER — FENTANYL CITRATE 50 UG/ML
25-50 INJECTION, SOLUTION INTRAMUSCULAR; INTRAVENOUS
Status: DISCONTINUED | OUTPATIENT
Start: 2020-03-24 | End: 2020-03-24 | Stop reason: HOSPADM

## 2020-03-24 RX ORDER — ONDANSETRON 2 MG/ML
4 INJECTION INTRAMUSCULAR; INTRAVENOUS ONCE
Status: COMPLETED | OUTPATIENT
Start: 2020-03-24 | End: 2020-03-24

## 2020-03-24 RX ORDER — MEPERIDINE HYDROCHLORIDE 50 MG/ML
12.5 INJECTION INTRAMUSCULAR; INTRAVENOUS; SUBCUTANEOUS
Status: DISCONTINUED | OUTPATIENT
Start: 2020-03-24 | End: 2020-03-24 | Stop reason: HOSPADM

## 2020-03-24 RX ORDER — PROCHLORPERAZINE MALEATE 5 MG
5 TABLET ORAL EVERY 6 HOURS PRN
Status: DISCONTINUED | OUTPATIENT
Start: 2020-03-24 | End: 2020-03-26 | Stop reason: HOSPADM

## 2020-03-24 RX ORDER — IBUPROFEN 200 MG
600 TABLET ORAL EVERY 6 HOURS PRN
Status: ON HOLD | COMMUNITY
End: 2020-03-26

## 2020-03-24 RX ORDER — CALCIUM CARBONATE 500 MG/1
500 TABLET, CHEWABLE ORAL DAILY PRN
Status: DISCONTINUED | OUTPATIENT
Start: 2020-03-24 | End: 2020-03-26 | Stop reason: HOSPADM

## 2020-03-24 RX ORDER — ALUMINA, MAGNESIA, AND SIMETHICONE 2400; 2400; 240 MG/30ML; MG/30ML; MG/30ML
5 SUSPENSION ORAL EVERY 4 HOURS PRN
Status: DISCONTINUED | OUTPATIENT
Start: 2020-03-24 | End: 2020-03-26 | Stop reason: HOSPADM

## 2020-03-24 RX ORDER — ONDANSETRON 2 MG/ML
4 INJECTION INTRAMUSCULAR; INTRAVENOUS EVERY 30 MIN PRN
Status: DISCONTINUED | OUTPATIENT
Start: 2020-03-24 | End: 2020-03-24 | Stop reason: HOSPADM

## 2020-03-24 RX ORDER — CEFTRIAXONE 1 G/1
1 INJECTION, POWDER, FOR SOLUTION INTRAMUSCULAR; INTRAVENOUS EVERY 24 HOURS
Status: DISCONTINUED | OUTPATIENT
Start: 2020-03-24 | End: 2020-03-26 | Stop reason: HOSPADM

## 2020-03-24 RX ORDER — LEVOTHYROXINE SODIUM 125 UG/1
250 TABLET ORAL DAILY
Status: DISCONTINUED | OUTPATIENT
Start: 2020-03-24 | End: 2020-03-26 | Stop reason: HOSPADM

## 2020-03-24 RX ORDER — PROPOFOL 10 MG/ML
INJECTION, EMULSION INTRAVENOUS PRN
Status: DISCONTINUED | OUTPATIENT
Start: 2020-03-24 | End: 2020-03-24

## 2020-03-24 RX ORDER — METOPROLOL TARTRATE 50 MG
50 TABLET ORAL 2 TIMES DAILY
Status: DISCONTINUED | OUTPATIENT
Start: 2020-03-24 | End: 2020-03-26 | Stop reason: HOSPADM

## 2020-03-24 RX ORDER — SODIUM CHLORIDE, SODIUM LACTATE, POTASSIUM CHLORIDE, CALCIUM CHLORIDE 600; 310; 30; 20 MG/100ML; MG/100ML; MG/100ML; MG/100ML
INJECTION, SOLUTION INTRAVENOUS CONTINUOUS
Status: DISCONTINUED | OUTPATIENT
Start: 2020-03-24 | End: 2020-03-24 | Stop reason: HOSPADM

## 2020-03-24 RX ORDER — ACETAMINOPHEN 325 MG/1
650 TABLET ORAL EVERY 4 HOURS PRN
Status: DISCONTINUED | OUTPATIENT
Start: 2020-03-24 | End: 2020-03-26 | Stop reason: HOSPADM

## 2020-03-24 RX ORDER — SODIUM CHLORIDE, SODIUM LACTATE, POTASSIUM CHLORIDE, CALCIUM CHLORIDE 600; 310; 30; 20 MG/100ML; MG/100ML; MG/100ML; MG/100ML
INJECTION, SOLUTION INTRAVENOUS CONTINUOUS PRN
Status: DISCONTINUED | OUTPATIENT
Start: 2020-03-24 | End: 2020-03-24

## 2020-03-24 RX ORDER — HYDROMORPHONE HYDROCHLORIDE 1 MG/ML
0.5 INJECTION, SOLUTION INTRAMUSCULAR; INTRAVENOUS; SUBCUTANEOUS ONCE
Status: COMPLETED | OUTPATIENT
Start: 2020-03-24 | End: 2020-03-24

## 2020-03-24 RX ORDER — HYDROMORPHONE HYDROCHLORIDE 1 MG/ML
.3-.5 INJECTION, SOLUTION INTRAMUSCULAR; INTRAVENOUS; SUBCUTANEOUS EVERY 10 MIN PRN
Status: DISCONTINUED | OUTPATIENT
Start: 2020-03-24 | End: 2020-03-24 | Stop reason: HOSPADM

## 2020-03-24 RX ORDER — LIDOCAINE 40 MG/G
CREAM TOPICAL
Status: DISCONTINUED | OUTPATIENT
Start: 2020-03-24 | End: 2020-03-26 | Stop reason: HOSPADM

## 2020-03-24 RX ORDER — GLYCOPYRROLATE 0.2 MG/ML
INJECTION, SOLUTION INTRAMUSCULAR; INTRAVENOUS PRN
Status: DISCONTINUED | OUTPATIENT
Start: 2020-03-24 | End: 2020-03-24

## 2020-03-24 RX ORDER — PHENYLEPHRINE HYDROCHLORIDE 10 MG/ML
INJECTION INTRAVENOUS PRN
Status: DISCONTINUED | OUTPATIENT
Start: 2020-03-24 | End: 2020-03-24

## 2020-03-24 RX ORDER — FENTANYL CITRATE 50 UG/ML
INJECTION, SOLUTION INTRAMUSCULAR; INTRAVENOUS PRN
Status: DISCONTINUED | OUTPATIENT
Start: 2020-03-24 | End: 2020-03-24

## 2020-03-24 RX ORDER — CEFTRIAXONE 1 G/1
1 INJECTION, POWDER, FOR SOLUTION INTRAMUSCULAR; INTRAVENOUS ONCE
Status: COMPLETED | OUTPATIENT
Start: 2020-03-24 | End: 2020-03-24

## 2020-03-24 RX ORDER — SODIUM CHLORIDE, SODIUM LACTATE, POTASSIUM CHLORIDE, CALCIUM CHLORIDE 600; 310; 30; 20 MG/100ML; MG/100ML; MG/100ML; MG/100ML
INJECTION, SOLUTION INTRAVENOUS CONTINUOUS
Status: DISCONTINUED | OUTPATIENT
Start: 2020-03-24 | End: 2020-03-26 | Stop reason: HOSPADM

## 2020-03-24 RX ORDER — HYDROMORPHONE HYDROCHLORIDE 1 MG/ML
.3-.5 INJECTION, SOLUTION INTRAMUSCULAR; INTRAVENOUS; SUBCUTANEOUS
Status: DISCONTINUED | OUTPATIENT
Start: 2020-03-24 | End: 2020-03-26 | Stop reason: HOSPADM

## 2020-03-24 RX ORDER — ONDANSETRON 4 MG/1
4 TABLET, ORALLY DISINTEGRATING ORAL EVERY 6 HOURS PRN
Status: DISCONTINUED | OUTPATIENT
Start: 2020-03-24 | End: 2020-03-26 | Stop reason: HOSPADM

## 2020-03-24 RX ADMIN — ONDANSETRON HYDROCHLORIDE 4 MG: 2 INJECTION, SOLUTION INTRAVENOUS at 11:24

## 2020-03-24 RX ADMIN — SODIUM CHLORIDE, POTASSIUM CHLORIDE, SODIUM LACTATE AND CALCIUM CHLORIDE: 600; 310; 30; 20 INJECTION, SOLUTION INTRAVENOUS at 19:02

## 2020-03-24 RX ADMIN — HYDROMORPHONE HYDROCHLORIDE 0.3 MG: 1 INJECTION, SOLUTION INTRAMUSCULAR; INTRAVENOUS; SUBCUTANEOUS at 03:53

## 2020-03-24 RX ADMIN — MIDAZOLAM 2 MG: 1 INJECTION INTRAMUSCULAR; INTRAVENOUS at 10:55

## 2020-03-24 RX ADMIN — DEXAMETHASONE SODIUM PHOSPHATE 4 MG: 4 INJECTION, SOLUTION INTRA-ARTICULAR; INTRALESIONAL; INTRAMUSCULAR; INTRAVENOUS; SOFT TISSUE at 11:14

## 2020-03-24 RX ADMIN — LIDOCAINE HYDROCHLORIDE 50 MG: 10 INJECTION, SOLUTION INFILTRATION; PERINEURAL at 11:06

## 2020-03-24 RX ADMIN — HYDROMORPHONE HYDROCHLORIDE 0.3 MG: 1 INJECTION, SOLUTION INTRAMUSCULAR; INTRAVENOUS; SUBCUTANEOUS at 20:04

## 2020-03-24 RX ADMIN — CEFTRIAXONE 1 G: 1 INJECTION, POWDER, FOR SOLUTION INTRAMUSCULAR; INTRAVENOUS at 01:35

## 2020-03-24 RX ADMIN — HYDROMORPHONE HYDROCHLORIDE 0.5 MG: 1 INJECTION, SOLUTION INTRAMUSCULAR; INTRAVENOUS; SUBCUTANEOUS at 01:12

## 2020-03-24 RX ADMIN — Medication 140 MG: at 11:06

## 2020-03-24 RX ADMIN — SODIUM CHLORIDE, POTASSIUM CHLORIDE, SODIUM LACTATE AND CALCIUM CHLORIDE: 600; 310; 30; 20 INJECTION, SOLUTION INTRAVENOUS at 03:53

## 2020-03-24 RX ADMIN — SODIUM CHLORIDE, POTASSIUM CHLORIDE, SODIUM LACTATE AND CALCIUM CHLORIDE: 600; 310; 30; 20 INJECTION, SOLUTION INTRAVENOUS at 10:46

## 2020-03-24 RX ADMIN — SODIUM CHLORIDE 1000 ML: 9 INJECTION, SOLUTION INTRAVENOUS at 01:24

## 2020-03-24 RX ADMIN — ROCURONIUM BROMIDE 10 MG: 10 INJECTION INTRAVENOUS at 11:06

## 2020-03-24 RX ADMIN — LEVOTHYROXINE SODIUM 250 MCG: 125 TABLET ORAL at 16:46

## 2020-03-24 RX ADMIN — GLYCOPYRROLATE 0.2 MG: 0.2 INJECTION, SOLUTION INTRAMUSCULAR; INTRAVENOUS at 11:20

## 2020-03-24 RX ADMIN — Medication 1 LOZENGE: at 17:45

## 2020-03-24 RX ADMIN — PHENYLEPHRINE HYDROCHLORIDE 150 MCG: 10 INJECTION INTRAVENOUS at 11:20

## 2020-03-24 RX ADMIN — SODIUM CHLORIDE, POTASSIUM CHLORIDE, SODIUM LACTATE AND CALCIUM CHLORIDE: 600; 310; 30; 20 INJECTION, SOLUTION INTRAVENOUS at 11:27

## 2020-03-24 RX ADMIN — Medication 1 LOZENGE: at 20:16

## 2020-03-24 RX ADMIN — ONDANSETRON 4 MG: 2 INJECTION INTRAMUSCULAR; INTRAVENOUS at 01:11

## 2020-03-24 RX ADMIN — OMEPRAZOLE 20 MG: 20 CAPSULE, DELAYED RELEASE ORAL at 16:47

## 2020-03-24 RX ADMIN — FENTANYL CITRATE 100 MCG: 50 INJECTION, SOLUTION INTRAMUSCULAR; INTRAVENOUS at 11:06

## 2020-03-24 RX ADMIN — PROPOFOL 200 MG: 10 INJECTION, EMULSION INTRAVENOUS at 11:06

## 2020-03-24 RX ADMIN — HYDROMORPHONE HYDROCHLORIDE 0.3 MG: 1 INJECTION, SOLUTION INTRAMUSCULAR; INTRAVENOUS; SUBCUTANEOUS at 06:08

## 2020-03-24 RX ADMIN — METOPROLOL TARTRATE 50 MG: 50 TABLET, FILM COATED ORAL at 20:04

## 2020-03-24 RX ADMIN — CEFTRIAXONE 1 G: 1 INJECTION, POWDER, FOR SOLUTION INTRAMUSCULAR; INTRAVENOUS at 21:35

## 2020-03-24 ASSESSMENT — MIFFLIN-ST. JEOR: SCORE: 1770.38

## 2020-03-24 ASSESSMENT — ACTIVITIES OF DAILY LIVING (ADL): ADLS_ACUITY_SCORE: 11

## 2020-03-24 ASSESSMENT — ENCOUNTER SYMPTOMS
SEIZURES: 0
DYSRHYTHMIAS: 0

## 2020-03-24 NOTE — PHARMACY-ADMISSION MEDICATION HISTORY
Admission medication history interview status for this patient is complete. See [facility] admission navigator for allergy information, prior to admission medications and immunization status.     Medication history interview source(s):Patient  Medication history resources (including written lists, pill bottles, clinic record):[facility] list, care everywhere  Primary pharmacy: [facility], [place]/JANAE Liu    Changes made to PTA medication list:  Added: none  Deleted: Hydroxyzine, oxycodone  Changed: none    Actions taken by pharmacist (provider contacted, etc):None     Additional medication history information:None    Medication reconciliation/reorder completed by provider prior to medication history? No      Prior to Admission medications    Medication Sig Last Dose Taking? Auth Provider   acetaminophen (TYLENOL) 325 MG tablet Take 3 tablets (975 mg) by mouth every 6 hours Scheduled x48hrs, then 650-975mg every 6hrs as needed, pain. Alternate with ibuprofen  Yes Monroe Skelton MD   ibuprofen (ADVIL/MOTRIN) 200 MG tablet Take 600 mg by mouth every 6 hours as needed for mild pain  Yes Unknown, Entered By History   Levothyroxine Sodium (SYNTHROID PO) Take 250 mcg by mouth daily  3/21/2020 at Unknown time Yes Reported, Patient   metoprolol (LOPRESSOR) 50 MG tablet Take 50 mg by mouth 2 times daily 3/21/2020 at Unknown time Yes Unknown, Entered By History   omeprazole 20 MG tablet Take 20 mg by mouth daily 3/21/2020 at Unknown time Yes Reported, Patient

## 2020-03-24 NOTE — ED PROVIDER NOTES
Emergency Department Attending Supervision Note  3/24/2020  1:42 AM      I evaluated this patient in conjunction with Brii Parr       Briefly, the patient presented with  Flank pain      On my exam, Gen- well appearing abd- BS+ mild TTP right flank    Results:    ED course:    My impression is Infected ureteral calculi. Vitally stable and discussed with Urology. Abx started and will admit for Stent placement in the AM        Diagnosis    ICD-10-CM    1. Ureteral stone  N20.1 UA with Microscopic     Lactic acid whole blood     Urine Culture    Right   2. Acute cystitis with hematuria  N30.01          Brii Walters, *        Reji Barillas MD  03/24/20 0144

## 2020-03-24 NOTE — PROGRESS NOTES
[facility]  Hospitalist Progress Note  Yajaira García MD 03/24/20  Text Page  Pager: [phone] (7am-6pm)    Reason for Stay (Diagnosis): ureterolithiasis and UTI         Assessment and Plan:      Summary of Stay: Enoch Barrios is a 39 year old female with past medical history of hypothyroidism, GERD and nephrolithiasis who was admitted on 3/23/2020 with right flank pain and was found to have right ureterolithiasis with hydronephrosis as well as associated UTI.  Patient underwent stent placement today and was found to have purulent discharge from the right ureter so will remain in hospital for IV antibiotics.    Problem List/Assessment and Plan:     1. Right Ureterolithiasis with associated UTI s/p ALLAN stent Placement: Presented with right flank pain.  She had CT which showed a right ureterolithiasis with associated hydronephrosis.  UA was also concerning for infection and she was started on IV ceftriaxone.  Urology was consulted and she underwent cystoscopy with ALLAN stent placement.  There was purulent discharge during this procedure and she will remain on IV antibiotics tonight.  -Continue IV ceftriaxone  -Continue IV fluids  -Urology consulted, appreciate recommendations  -We will need to follow-up with her local urologist for lithotripsy after she has completed antibiotic course    2.  Hypothyroidism: Continue Synthroid.    3.  GERD: Continue omeprazole.    4.  Hypertension: Continue metoprolol with hold parameters.    5.  Acute on chronic kidney disease: Patient's baseline creatinine approximately 1.2-1.4.  Creatinine was elevated to 1.65 on admission.  This was in the setting of ureterolithiasis with hydronephrosis.  She has had cystoscopy and stent placement as above.  Continue IV fluids and repeat BMP tomorrow.  Avoid NSAIDs.    6.  Fatty liver: Patient has mild transaminitis.  She has fatty liver is seen on imaging.  She does need to follow-up with primary care for monitoring of  "this.    Diet: Regular Diet Adult    DVT Prophylaxis: Low Risk/Ambulatory with no VTE prophylaxis indicated  Erazo Catheter: not present  Code Status: Full Code      Disposition Plan   Expected discharge: Tomorrow, recommended to prior living arrangement once antibiotic plan established.  Entered: Yajaira García MD 03/24/2020, 2:59 PM       The patient's care was discussed with the Bedside Nurse and Patient.    Hospitalist Service  [facility]          Interval History (Subjective):      Patient was seen after cystoscopy.  At this time she denies pain.  No chest pain or shortness of breath.  She has been drinking fluids since her procedure, no nausea or vomiting.                  Physical Exam:      Last Vital Signs:  BP (!) 130/90 (BP Location: Right arm)   Pulse 97   Temp 98.7  F (37.1  C) (Oral)   Resp 20   Ht 1.676 m (5' 6\")   Wt 108.9 kg (240 lb)   LMP 02/10/2020 (Within Days)   SpO2 97%   BMI 38.74 kg/m      General: Alert, awake, no acute distress.  HEENT: Normocephalic and atraumatic, eyes anicteric and without scleral injection, EOMI, face symmetric, MMM.  Cardiac: RRR, normal S1, S2. No m/g/r, no LE edema.  Pulmonary: Normal chest rise, normal work of breathing.  Lungs CTAB without crackles or wheezing.  Abdomen: soft, non-tender, non-distended.  Normoactive bowel sounds, no guarding or rebound tenderness.  Extremities: no deformities.  Warm, well perfused.  Skin: no rashes or lesions.  Warm and Dry.  Neuro: No focal deficits.  Speech clear.  Coordination and strength grossly normal.  Psych: Alert and oriented x3. Appropriate affect.         Medications:      All current medications were reviewed with changes reflected in problem list.         Data:      All new lab and imaging data was reviewed.   Labs:  Recent Labs   Lab 03/23/20  2335      POTASSIUM 4.1   CHLORIDE 100   CO2 26   ANIONGAP 7   *   BUN 18   CR 1.65*   GFRESTIMATED 38*   GFRESTBLACK 44*   YENY 9.4 "     Recent Labs   Lab 03/23/20  2335   WBC 14.9*   HGB 11.7   HCT 37.4   MCV 99         Imaging:   Recent Results (from the past 24 hour(s))   Abdomen US, limited (RUQ only)    Narrative    EXAM: US ABDOMEN LIMITED  LOCATION: [facility]  DATE/TIME: 3/24/2020 12:06 AM    INDICATION: Right flank pain.    COMPARISON: CT 3/24/2020.    TECHNIQUE: Limited abdominal ultrasound.    FINDINGS:  GALLBLADDER: Normal. No gallstones, wall thickening or pericholecystic fluid. Negative sonographic Costa's sign.    BILE DUCTS: No biliary dilatation. The common duct measures 5 mm.    LIVER: Increased liver echogenicity suggesting fatty infiltration. Otherwise negative.    RIGHT KIDNEY: Marked right hydronephrosis as seen on the CT.    PANCREAS: Obscured by gas and not diagnostically visualized.    No ascites.      Impression    IMPRESSION:  1.  Marked right hydronephrosis.    2.  No other acute findings.    3.  Fatty infiltration of the liver.     Abd/pelvis CT no contrast - Stone Protocol    Narrative    EXAM: CT ABDOMEN PELVIS W/O CONTRAST  LOCATION: [facility]  DATE/TIME: 3/24/2020 12:26 AM    INDICATION: Flank pain. Suspected ureteral stone.    COMPARISON: Ultrasound 3/24/2020.    TECHNIQUE: CT scan of the abdomen and pelvis was performed without IV contrast. Multiplanar reformats were obtained. Dose reduction techniques were used.    CONTRAST: None.    FINDINGS:   LOWER CHEST: Normal.    HEPATOBILIARY: Diffuse fatty infiltration of the liver. No radiopaque gallstones or bile duct dilatation.     PANCREAS: Normal.    SPLEEN: Normal.    ADRENAL GLANDS: Normal.    KIDNEYS/BLADDER: Marked right hydronephrosis due to two stones in the distal ureter including a 10 mm stone in the ureter at the level of the midpelvis and an 8 mm stone distal to this, just above the UVJ. A few tiny bilateral renal calculi. Right renal   cortical thinning. Scarring upper pole left kidney.    BOWEL: Colonic diverticula  without diverticulitis. No bowel obstruction.    LYMPH NODES: Normal.    VASCULATURE: Unremarkable.    PELVIC ORGANS: Uterus is present. No suspicious pelvic masses. No significant ascites.    OTHER: Evaluation of the solid abdominal organs is limited by lack of contrast. Small fat-containing periumbilical hernia.    MUSCULOSKELETAL: Normal.      Impression    IMPRESSION:   1.  Marked right hydronephrosis due to two stones in the distal ureter measuring 10 mm and 8 mm.    2.  Tiny renal calculi.    3.  Fatty liver.     XR Surgery PINKY L/T 5 Min Fluoro w Stills    Narrative    This exam was marked as non-reportable because it will not be read by a   radiologist or a [place] non-radiologist provider.             Yajaira García MD

## 2020-03-24 NOTE — BRIEF OP NOTE
Diagnosis: Urinary tract infection, obstructing right ureteral calculi  Procedure: Video cystoscopy, right double-J stent insertion (5 Romansh by 24 cm)  Surgeon: Loreto  Anesthesia: General laryngeal mask  EBL: 0 ml  Findings: Purulent discharge from right ureter after Glidewire placed.  Stent in good position

## 2020-03-24 NOTE — H&P
[facility]    History and Physical - Hospitalist Service       Date of Admission:  3/24/2020     Assessment & Plan   Enoch Barrios is a 39 year old female with a history of hypothyroidism, hypertension, CKD stage III, history of kidney stones requiring stent placement, who is admitted to the hospitalist service with ureterolithiasis and apparent UTI.    R Ureterolithiasis w/ hydronephrosis  - Patient has a history of kidney stone and presented with severe onset of right-sided flank pain.  CT of the abdomen/pelvis showed obstructing 8 and 10 mm stones with associated hydronephrosis.  - Consult urology for definitive management.  - Pain control with Dilaudid.  - Remain n.p.o.  - Continuous IV fluids.    UTI  - Urinalysis consistent with infection with > 182 WBCs.  No sign of pyelonephritis seen on CT scan. WBC elevated at 14.9 but no other signs of sepsis.  - Received ceftriaxone in the ED, continue same. Urine culture pending.     CKD stage III  Creatinine is mildly elevated at 1.65 on admission, most recently was 1.44 so does not meet criteria for ALYSSIA.  Received crystalloid bolus in the ED, will continue with maintenance IV fluids over the floor.    Fatty liver  AST and ALT mildly elevated with associated fatty liver seen on imaging scans.  Patient should follow-up with PCP regarding further management.     Chronic medical conditions:  HTN: Await med rec.  Hypothyroid: Await med rec.    Diet: NPO  DVT Prophylaxis: Pneumatic Compression Devices  Erazo Catheter: No  Code Status: No    Disposition Plan   Expected discharge:   1-2 night hospital stay.     Arvin Durham MD  [facility]    ______________________________________________________________________    Chief Complaint   Flank Pain    History of Present Illness   Enoch Barrios is a 39 year old female with a history of hypothyroidism, hypertension, CKD stage III, history of kidney stones requiring stent placement, who  presented to the ED today complaining of right flank pain.    Patient reports that lately she has been feeling in her normal state of health, but developed the sudden acute onset of severe right flank pain at around 4 PM on 3/23.  This was not accompanied by any significant other symptoms except for mild chills.  She presented directly to the emergency department, where her vital signs were noted to be stable.  Laboratory work-up was notable for creatinine elevated to 1.65 up from baseline 1.3, mild elevation in transaminases, leukocytosis to 14.9, and urine with appearance of infection with > 182 WBCs and 55 RBCs.  Ultrasound of the abdomen showed marked right hydronephrosis and fatty liver.  CT without contrast of the abdomen/pelvis showed 2 stones in the distal ureter measuring 10 and 8 mm with associated right hydronephrosis.  Urology was contacted in the ED and patient was given a dose of ceftriaxone.    Patient reports that other than the symptoms mentioned above, she has been feeling well lately.  There has been no cough, fever, diarrhea or any other new symptoms.    Review of Systems    The 10 point Review of Systems is negative other than noted in the HPI or here.     Physical Exam   BP (!) 128/92   Pulse 92   Temp 98.3  F (36.8  C) (Oral)   Resp 18   LMP 02/10/2020 (Within Days)   SpO2 97%        General: No acute distress, lying in the bed.    HEENT: No scleral icterus. Oropharynx moist.     Neck: Supple.    Pulmonary: Normal work of breathing. Clear to auscultation bilaterally.    Cardiovascular: Regular rate and rhythm without murmur or extra heart sounds.    Abdomen: Soft and non-tender.  Mild right flank pain to palpation.    Extremities: No peripheral edema. No clubbing or cyanosis.     Neurologic: Awake, alert, appropriate.    Skin: Warm and dry.    Psychiatric: Normal affect and mood.     Data   Data reviewed today: I have reviewed all labs and imaging results.    Recent Labs   Lab  03/23/20  2335   WBC 14.9*   HGB 11.7   MCV 99         POTASSIUM 4.1   CHLORIDE 100   CO2 26   BUN 18   CR 1.65*   ANIONGAP 7   YENY 9.4   *   ALBUMIN 4.3   PROTTOTAL 8.1   BILITOTAL 0.8   ALKPHOS 179*   ALT 75*   AST 68*   LIPASE 200     Recent Results (from the past 24 hour(s))   Abdomen US, limited (RUQ only)    Narrative    EXAM: US ABDOMEN LIMITED  LOCATION: [facility]  DATE/TIME: 3/24/2020 12:06 AM    INDICATION: Right flank pain.    COMPARISON: CT 3/24/2020.    TECHNIQUE: Limited abdominal ultrasound.    FINDINGS:  GALLBLADDER: Normal. No gallstones, wall thickening or pericholecystic fluid. Negative sonographic Costa's sign.    BILE DUCTS: No biliary dilatation. The common duct measures 5 mm.    LIVER: Increased liver echogenicity suggesting fatty infiltration. Otherwise negative.    RIGHT KIDNEY: Marked right hydronephrosis as seen on the CT.    PANCREAS: Obscured by gas and not diagnostically visualized.    No ascites.      Impression    IMPRESSION:  1.  Marked right hydronephrosis.    2.  No other acute findings.    3.  Fatty infiltration of the liver.     Abd/pelvis CT no contrast - Stone Protocol    Narrative    EXAM: CT ABDOMEN PELVIS W/O CONTRAST  LOCATION: [facility]  DATE/TIME: 3/24/2020 12:26 AM    INDICATION: Flank pain. Suspected ureteral stone.    COMPARISON: Ultrasound 3/24/2020.    TECHNIQUE: CT scan of the abdomen and pelvis was performed without IV contrast. Multiplanar reformats were obtained. Dose reduction techniques were used.    CONTRAST: None.    FINDINGS:   LOWER CHEST: Normal.    HEPATOBILIARY: Diffuse fatty infiltration of the liver. No radiopaque gallstones or bile duct dilatation.     PANCREAS: Normal.    SPLEEN: Normal.    ADRENAL GLANDS: Normal.    KIDNEYS/BLADDER: Marked right hydronephrosis due to two stones in the distal ureter including a 10 mm stone in the ureter at the level of the midpelvis and an 8 mm stone distal to this,  just above the UVJ. A few tiny bilateral renal calculi. Right renal   cortical thinning. Scarring upper pole left kidney.    BOWEL: Colonic diverticula without diverticulitis. No bowel obstruction.    LYMPH NODES: Normal.    VASCULATURE: Unremarkable.    PELVIC ORGANS: Uterus is present. No suspicious pelvic masses. No significant ascites.    OTHER: Evaluation of the solid abdominal organs is limited by lack of contrast. Small fat-containing periumbilical hernia.    MUSCULOSKELETAL: Normal.      Impression    IMPRESSION:   1.  Marked right hydronephrosis due to two stones in the distal ureter measuring 10 mm and 8 mm.    2.  Tiny renal calculi.    3.  Fatty liver.         Past Medical History    I have reviewed this patient's medical history and updated it with pertinent information if needed.   Past Medical History:   Diagnosis Date     Abnormal Pap smear     colposcopy     Chronic kidney disease      Gastroesophageal reflux disease      Hypertension      Thyroid disease     on thyroid meds/hypothyroid        Past Surgical History    I have reviewed this patient's surgical history and updated it with pertinent information if needed.  Past Surgical History:   Procedure Laterality Date     CYSTOSCOPY, RETROGRADES, INSERT STENT URETER(S), COMBINED Right 9/3/2015    Procedure: COMBINED CYSTOSCOPY, RETROGRADES, INSERT STENT URETER(S);  Surgeon: Joseph Dangelo MD;  Location:  OR     DILATION AND CURETTAGE SUCTION N/A 5/7/2016    Procedure: DILATION AND CURETTAGE SUCTION;  Surgeon: Albert Hammer MD;  Location:  OR     OPEN REDUCTION INTERNAL FIXATION FOOT Right 12/2/2019    Procedure: Open reduction internal fixation right LisFranc fracture, open reduction internal fixation right third metatarsal base fracture;  Surgeon: Monroe Skelton MD;  Location:  OR        Social History    I have reviewed this patient's social history and updated it with pertinent information if needed.  Social History      Tobacco Use     Smoking status: Never Smoker     Smokeless tobacco: Never Used   Substance Use Topics     Alcohol use: No     Drug use: No          Family History    I have reviewed this patient's family history and updated it with pertinent information if needed.   None pertinent.    Prior to Admission Medications    Prior to Admission Medications   Prescriptions Last Dose Informant Patient Reported? Taking?   Levothyroxine Sodium (SYNTHROID PO)   Yes No   Sig: Take 250 mcg by mouth daily    acetaminophen (TYLENOL) 325 MG tablet   No No   Sig: Take 3 tablets (975 mg) by mouth every 6 hours Scheduled x48hrs, then 650-975mg every 6hrs as needed, pain. Alternate with ibuprofen   gabapentin (NEURONTIN) 300 MG capsule   No No   Sig: Take 1 capsule (300 mg) by mouth 3 times daily for 3 days   hydrOXYzine (VISTARIL) 50 MG capsule   No No   Sig: Take 1 capsule (50 mg) by mouth 3 times daily as needed (muscle spasm, anxiety, discomfort, nausea)   ibuprofen (ADVIL/MOTRIN) 600 MG tablet   No No   Sig: Take 1 tablet (600 mg) by mouth every 6 hours Scheduled x48hrs, then 600mg every 6hrs as needed, pain. Alternate with tylenol   metoprolol (LOPRESSOR) 50 MG tablet   Yes No   Sig: Take 50 mg by mouth 2 times daily   omeprazole 20 MG tablet   Yes No   Sig: Take 20 mg by mouth daily   oxyCODONE (ROXICODONE) 5 MG tablet   No No   Sig: Take 1 tablet (5 mg) by mouth every 4 hours as needed for breakthrough pain (pain not controlled by elevation, ice, ibuprofen, tylenol, gabapentin)      Facility-Administered Medications: None        Allergies    No Known Allergies

## 2020-03-24 NOTE — ED TRIAGE NOTES
Pt states right sided flank pain radiating to right groin since 1600. Denies cough, fever, chest pain, or dyspnea. ABCs intact GCS 15

## 2020-03-24 NOTE — ANESTHESIA CARE TRANSFER NOTE
Patient: Enoch Barrios    Procedure(s):  CYSTOSCOPY, RIGHT JJ STENT    Diagnosis: Right ureteral stone [N20.1]  Diagnosis Additional Information: No value filed.    Anesthesia Type:   General     Note:  Airway :Face Mask  Patient transferred to:PACU  Comments: VSS, patent airway, report to RN.Handoff Report: Identifed the Patient, Identified the Reponsible Provider, Reviewed the pertinent medical history, Discussed the surgical course, Reviewed Intra-OP anesthesia mangement and issues during anesthesia, Set expectations for post-procedure period and Allowed opportunity for questions and acknowledgement of understanding      Vitals: (Last set prior to Anesthesia Care Transfer)    CRNA VITALS  3/24/2020 1102 - 3/24/2020 1144      3/24/2020             Pulse:  103    SpO2:  100 %                Electronically Signed By: MARY Florse CRNA  March 24, 2020  11:44 AM

## 2020-03-24 NOTE — PLAN OF CARE
VSS afebile, A&O x4. Ambulates SBA. NPO for surgical evaluation. Pt on 1L NC. IVF infusing at 150ml/hr. Pt c/o pain 6/10, pain improved with administration of IV Dilaudid. IV Rocephin for UTI. Straining urine, CT confirmed renal calculi. Will continue plan of care.

## 2020-03-24 NOTE — ED NOTES
[facility]  ED Nurse Handoff Report    Enoch Barrios is a 39 year old female   ED Chief complaint: Flank Pain  . ED Diagnosis:   Final diagnoses:   Ureteral stone - Right   Acute cystitis with hematuria     Allergies: No Known Allergies    Code Status: Full Code  Activity level - Baseline/Home:  Independent. Activity Level - Current:   Stand by Assist. Lift room needed: No. Bariatric: No   Needed: No   Isolation: No. Infection: Not Applicable.     Vital Signs:   Vitals:    03/23/20 2330 03/23/20 2335 03/24/20 0045 03/24/20 0134   BP: (!) 156/100  (!) 137/101    Pulse:  86 87    Resp:       Temp:    98.3  F (36.8  C)   TempSrc:    Oral   SpO2:  95% 99%        Cardiac Rhythm:  ,      Pain level: 0-10 Pain Scale: 5  Patient confused: No. Patient Falls Risk: Yes.   Elimination Status: Has voided and Unable to void   Patient Report - Initial Complaint:.right sided flank pain radiating to groin that started Monday evening   Tests Performed:   Abnormal Labs Reviewed   BASIC METABOLIC PANEL - Abnormal; Notable for the following components:       Result Value    Glucose 133 (*)     Creatinine 1.65 (*)     GFR Estimate 38 (*)     GFR Estimate If Black 44 (*)     All other components within normal limits   CBC WITH PLATELETS DIFFERENTIAL - Abnormal; Notable for the following components:    WBC 14.9 (*)     RBC Count 3.77 (*)     MCHC 31.3 (*)     Absolute Neutrophil 12.3 (*)     All other components within normal limits   ROUTINE UA WITH MICROSCOPIC - Abnormal; Notable for the following components:    Blood Urine Moderate (*)     Protein Albumin Urine 50 (*)     Nitrite Urine Positive (*)     Leukocyte Esterase Urine Large (*)     WBC Urine >182 (*)     RBC Urine 55 (*)     WBC Clumps Present (*)     Bacteria Urine Many (*)     Squamous Epithelial /HPF Urine 17 (*)     Mucous Urine Present (*)     Amorphous Crystals Few (*)     All other components within normal limits   HEPATIC PANEL - Abnormal;  Notable for the following components:    Alkaline Phosphatase 179 (*)     ALT 75 (*)     AST 68 (*)     All other components within normal limits   . Abnormal Results: see above,  Abd CT-kidney stones  Treatments provided: IVF, meds  Family Comments: family aware of admission  OBS brochure/video discussed/provided to patient:  N/A  ED Medications:   Medications   0.9% sodium chloride BOLUS (1,000 mLs Intravenous New Bag 3/24/20 0124)   cefTRIAXone (ROCEPHIN) 1 g vial to attach to  mL bag for ADULTS or NS 50 mL bag for PEDS (1 g Intravenous New Bag 3/24/20 0135)   HYDROmorphone (PF) (DILAUDID) injection 0.5 mg (0.5 mg Intravenous Given 3/23/20 2350)   ondansetron (ZOFRAN) injection 4 mg (4 mg Intravenous Given 3/23/20 2350)   ondansetron (ZOFRAN) injection 4 mg (4 mg Intravenous Given 3/24/20 0111)   HYDROmorphone (PF) (DILAUDID) injection 0.5 mg (0.5 mg Intravenous Given 3/24/20 0112)     Drips infusing:  Yes  For the majority of the shift, the patient's behavior Green..    Sepsis treatment initiated: No       ED Nurse Name/Phone Number: Dallin Villavicencio RN,   1:43 AM      RECEIVING UNIT ED HANDOFF REVIEW    Above ED Nurse Handoff Report was reviewed: Yes  Reviewed by: Maribell Suarez RN on March 24, 2020 at 2:36 AM

## 2020-03-24 NOTE — PROGRESS NOTES
PRIMARY DIAGNOSIS: ACUTE RENAL COLIC  OUTPATIENT/OBSERVATION GOALS TO BE MET BEFORE DISCHARGE:     1. Pain resolved (or significantly improved): no pain post procedure. Returned to floor at 1300. Alert/oriented. able to swallow apple juice/pudding w/o difficulties.  2. Tolerating adequate PO diet: regular diet   3. Cleared by consultants (if involved): surgery this morning. Pt will stay overnoc for monitoring/antibiotics.     Please review provider orders for any additional goals.     Nurse to notify provider when observation goals have been met and patient is ready for discharge.

## 2020-03-24 NOTE — ED PROVIDER NOTES
History     Chief Complaint:  Flank Pain    HPI   Enoch Barrios is a 39 year old female who presents with flank pain. The patient reports that the pain started at 1600 and has been non stop, radiating from her right flank to her groin and back. She states the pain to be 10/10. She denies any dysuria, history of ovarian cyst, chance of STD or pregnancy, dysuria, cough, fever, or shortness of breath. She notes to have a potential history of kidney stones.    Allergies:  No Known Drug Allergies      Medications:    Gabapentin   Vistral   Lopressor   Oxycodone  Omeprazole   Synthroid    Past Medical History:    Abnormal pap smear  Gastroesophageal reflux disease   CKD  Hypertension   Thyroid disease  Miscarriage    Past Surgical History:    Cystoscopy, insert stent ureters   Dilation and curettage suction   Open reduction internal fixation foot    Family History:    Family history reviewed. No pertinent family history.     Social History:  Patient was not accompanied to the ED.   Smoking Status: Never Smoker  Smokeless Tobacco: Never Used  Alcohol Use: Negative  Drug Use: Negative   PCP: [facility], [place]  Marital Status:  Single     Review of Systems   Constitutional: Negative for fever.   Respiratory: Negative for cough and shortness of breath.    Genitourinary: Positive for flank pain. Negative for dysuria.   Musculoskeletal: Positive for back pain.        Groin Pain   All other systems reviewed and are negative.      Physical Exam   Vitals:   Patient Vitals for the past 24 hrs:   BP Temp Temp src Pulse Heart Rate Resp SpO2   03/24/20 0134 -- 98.3  F (36.8  C) Oral -- -- -- --   03/24/20 0045 (!) 137/101 -- -- 87 -- -- 99 %   03/23/20 2335 -- -- -- 86 -- -- 95 %   03/23/20 2330 (!) 156/100 -- -- -- -- -- --   03/23/20 2316 (!) 168/122 98.9  F (37.2  C) Temporal -- 98 18 98 %      Physical Exam  General: Alert and interactive. Appears uncomfortable. Cooperative and pleasant.   Eyes: The pupils are equal  and round. EOMs intact. No scleral icterus.  ENT: No abnormalities to the external nose or ears. Mucous membranes moist. Posterior oropharynx is non-erythematous.      Neck: Trachea is in the midline. No nuchal rigidity.     CV: Regular rate and rhythm. S1 and S2 normal without murmur, click, gallop or rub.   Resp: Breath sounds are clear bilaterally, without rhonchi, wheezes, rales. Non-labored, no retractions or accessory muscle use.     GI: Abdomen is soft without distension. RLQ abdominal TTP.   MS: Moving all extremities well. Good muscle tone.   Skin: Warm and dry. No rash or lesions noted.  Neuro: Alert and oriented x 3. No focal neurologic deficits. Good strength and sensation in upper and lower extremities.    Psych: Awake. Alert.  Normal affect. Appropriate interactions.  Lymph: No anterior or posterior cervical lymphadenopathy noted.    Emergency Department Course     Imaging:  Radiology findings were communicated with the patient who voiced understanding of the findings.    Abd/pelvis CT no contrast - Stone Protocol   Final Result   IMPRESSION:    1.  Marked right hydronephrosis due to two stones in the distal ureter measuring 10 mm and 8 mm.      2.  Tiny renal calculi.      3.  Fatty liver.         Abdomen US, limited (RUQ only)   Final Result   IMPRESSION:   1.  Marked right hydronephrosis.      2.  No other acute findings.      3.  Fatty infiltration of the liver.           Laboratory:  Laboratory findings were communicated with the patient who voiced understanding of the findings.    CBC: WBC 14.9 (H), HGB 11.7,   BMP: Glucose 133, Creatinine 1.65 (H)  HCG: negative  Lactic Acid: 2.0    Interventions:  2350 Zofran 4 mg IV  2350 Dilaudid 0.5 mg IV  0111 Zofran 4 mg IV  0112  Dilaudid 0.5 mg IV  0124 NaCl 1000 ml IV  0135 Rocephin 1g IV    Emergency Department Course:    2319 Nursing notes and vitals reviewed.    2330 I performed an exam of the patient as documented above.     Patient sent to  CT. Labs drawn. Urine collected.     0135 Discussed case with Dr. Zacarias, urology. Patient to be kept NPO.     0142 Discussed case with Dr. Durham, hospitalist.     I discussed the treatment plan with the patient. She expressed understanding of this plan and consented to admission.       Impression & Plan   Medical Decision Making:  Enoch Barrios is a 39 year old female who presented with unilateral flank & abdominal pain, consistent with renal colic. CT confirms two large ureteral stones in the right distal ureter. Renal function is slightly elevated from baseline (1.40-1.65). Lactic acid 2.0 and patient has leukocytosis, but no signs of sepsis currently (lactic acid technically not > 2.0). CT and lab workup show no other alternative etiology that could be causing her symptoms (e.g., AAA, appendicitis). Urine is infected. She will be started on Rocephin and fluids and admitted to the hospital team. Urology made aware and will consult in morning. She was made NPO. Signed out to Dr. Barillas pending admission upstairs.     Diagnosis:    ICD-10-CM    1. Ureteral stone  N20.1 UA with Microscopic     Lactic acid whole blood     Urine Culture    Right   2. Acute cystitis with hematuria  N30.01        Disposition:  Admitted to Dr. Durham, medical bed    Scribe Disclosure:  I, Juan Samson, am serving as a scribe at 11:30 PM on 3/23/2020 to document services personally performed by Brii Walters PAC based on my observations and the provider's statements to me.      [facility] EMERGENCY DEPARTMENT       Brii Walters PA-C  03/24/20 0155       Brii Walters PA-C  03/24/20 0206

## 2020-03-24 NOTE — OP NOTE
Procedure Date: 03/24/2020      PREOPERATIVE DIAGNOSES:  Urinary tract infection, right flank pain, distal right ureteral calculi.      POSTOPERATIVE DIAGNOSES:  Urinary tract infection, right flank pain, distal right ureteral calculi, pyonephrosis.      SURGEON:  Joseph Dangelo MD      ANESTHESIA:  General laryngeal mask.      PROCEDURES:  Video cystoscopy, right double-J stent placement (5-Canadian x24 cm).       ESTIMATED BLOOD LOSS:  0 mL.      INDICATIONS:  The patient is a 39-year-old female who has had a history of calcium urolithiasis and follows with [facility].  It has been a year or 2 since she has had followup.  She presented last night with right flank pain and on CT has bilateral small renal calculi and 2 large calculi in the distal right ureter.  Her urinalysis looks infected.  The patient has been afebrile and has had stable vital signs.  I recommend that she undergo cystoscopy and right double-J stent placement because of the urinary tract infection.  The procedure, the alternatives, risks and followup with [facility] was discussed.  She agrees.      DESCRIPTION OF THE PROCEDURE:  The patient had received IV Rocephin on admission.  She was brought to cystoscopy and placed supine on the operating table.  After adequate general laryngeal mask anesthesia, the patient was placed in lithotomy position and her genitalia were prepped and draped in a sterile fashion.  A #22 Canadian Storz cystoscope with obturator was introduced into the bladder and residual urine was clear.  Using the 30-degree lens and video and water as an irrigant, the urethra and bladder were examined revealing normal mucosa and no stones.  The right ureteral orifice was identified, and I passed a Glidewire easily up to the right renal pelvis where it curled under fluoroscopy.  Immediately, there was thick purulent discharge from the right ureter.  I then passed the 5-Canadian x 24 cm Polaris stent based on her  height of 5 feet 6 inches.  This went into position easily and I removed the Glidewire and the stent curled nicely in the region of the right renal pelvis and in the bladder with continued efflux of thick purulent material.  The bladder was emptied and the cystoscope removed.  The patient went to the recovery room in stable condition and will be readmitted to the floor for observation and continued IV antibiotic therapy.         JOSEPH SUGGS JR, MD             D: 2020   T: 2020   MT: NICKI      Name:     MARY GUARDADO   MRN:      [id number]        Account:        [id number]   :      1980           Procedure Date: 2020      Document: [id number]       cc: [facility]        Joseph Suggs Jr, MD

## 2020-03-24 NOTE — PLAN OF CARE
Pt alert/oriented. Voiding, no stones. HCG negative. Pt to pre-op for procedure by Dr. Bliss. Unsure of plan yet to discharge home or keep in Observation status.     PRIMARY DIAGNOSIS: ACUTE RENAL COLIC  OUTPATIENT/OBSERVATION GOALS TO BE MET BEFORE DISCHARGE:    1. Pain resolved (or significantly improved): pain minimal prior to tx to pre-op  2. Tolerating adequate PO diet: NPO for surgery cysto possible L stent  3. Cleared by consultants (if involved): surgery this morning.     Please review provider orders for any additional goals.    Nurse to notify provider when observation goals have been met and patient is ready for discharge.

## 2020-03-24 NOTE — CONSULTS
The patient is a 39-year-old female admitted through the emergency room with a infected appearing urinalysis, no fever and 2 stones in the distal right ureter.  She is a long history of calcium urolithiasis and sees [facility] but has not been there in the past 2 years.  It is not clear whether she has had a metabolic stone evaluation.  On admission she has normal electrolytes, creatinine of 1.65 and a normal serum calcium level.  Her urinalysis shows a pH of 5.5 and numerous white blood cells.  Urine culture is pending  CT scan reviewed and shows marked right hydronephrosis due to distal stones measuring 10 mm and 8 mm.  There are also tiny bilateral renal calculi  Other past medical history: Abnormal Pap smear, GERD, chronic renal disease, hypertension, thyroid disease, history of miscarriage.  Cystoscopy with stent insertions, D&C, open reduction and internal fixation of foot fracture  Medications at home: Gabapentin, Vistaril, Lopressor, oxycodone, omeprazole, Synthroid  Allergies: None  Exam: Alert and oriented, stable vital signs.  Normal respirations.  Assessment, UTI, right flank pain and right hydronephrosis due to 2 distal ureteral calculi  Plan: Patient has been n.p.o.  Recommend cystoscopy with right double-J stent placement later this morning.  She will then follow-up with her [facility]ist for definitive treatment and follow-up.  The procedure, alternatives, risks and follow-up carefully discussed

## 2020-03-24 NOTE — ANESTHESIA PREPROCEDURE EVALUATION
Anesthesia Pre-Procedure Evaluation    Patient: Enoch Barrios   MRN: [id number] : 1980          Preoperative Diagnosis: Right ureteral stone [N20.1]    Procedure(s):  CYSTOSCOPY, RIGHT JJ STENT    Past Medical History:   Diagnosis Date     Abnormal Pap smear     colposcopy     Chronic kidney disease      Gastroesophageal reflux disease      Hypertension      Thyroid disease     on thyroid meds/hypothyroid     Past Surgical History:   Procedure Laterality Date     CYSTOSCOPY, RETROGRADES, INSERT STENT URETER(S), COMBINED Right 9/3/2015    Procedure: COMBINED CYSTOSCOPY, RETROGRADES, INSERT STENT URETER(S);  Surgeon: Joseph Dangelo MD;  Location: [facility] OR     DILATION AND CURETTAGE SUCTION N/A 2016    Procedure: DILATION AND CURETTAGE SUCTION;  Surgeon: Albert Hammer MD;  Location: [facility] OR     OPEN REDUCTION INTERNAL FIXATION FOOT Right 2019    Procedure: Open reduction internal fixation right LisFranc fracture, open reduction internal fixation right third metatarsal base fracture;  Surgeon: Monroe Skelton MD;  Location: [facility] OR     Anesthesia Evaluation     .             ROS/MED HX    ENT/Pulmonary:      (-) asthma and sleep apnea   Neurologic:      (-) seizures, CVA, Other neuro hx, Dementia, Neuropathy and migraines   Cardiovascular:     (+) hypertension----. : . . . :. .      (-) CAD, CHF, arrhythmias, pulmonary hypertension and dyslipidemia   METS/Exercise Tolerance:     Hematologic:        (-) anemia   Musculoskeletal:  - neg musculoskeletal ROS       GI/Hepatic:     (+) GERD Symptomatic,       Renal/Genitourinary:     (+) Nephrolithiasis ,       Endo:     (+) thyroid problem hypothyroidism, Obesity, .      Psychiatric:        (-) psychiatric history   Infectious Disease:  - neg infectious disease ROS       Malignancy:      - no malignancy   Other:    - neg other ROS                      Physical Exam      Airway   Mallampati: II  TM distance: >3 FB  Neck ROM: full    Dental      Cardiovascular   Rhythm and rate: regular and normal  (-) no murmur    Pulmonary    breath sounds clear to auscultation    Other findings: Lab Test        03/23/20     03/01/18     10/22/17      --          05/07/16      --          09/03/15                       2335          2258          2045           --           0615           --           0010          WBC          14.9*        9.4          9.1            < >        11.5*          < >        13.4*         HGB          11.7         11.9         11.6*          < >        10.0*          < >        11.7          MCV          99           94           96             < >        95             < >        94            PLT          167          176          194            < >        159            < >        148*          INR           --           --           --           --          0.92          --          0.95           < > = values in this interval not displayed.                  Lab Test        03/23/20     12/02/19     03/01/18     10/22/17                       2335          1246          2258 2045          NA           133           --          134          135           POTASSIUM    4.1          4.1          4.3          4.4           CHLORIDE     100           --          100          102           CO2          26            --          28           25            BUN          18            --          18           17            CR           1.65*        1.44*        1.40*        1.38*         ANIONGAP     7             --          6            8             YENY          9.4           --          9.1          7.9*          GLC          133*          --          103*         118*                Lab Results   Component Value Date    WBC 14.9 (H) 03/23/2020    HGB 11.7 03/23/2020    HCT 37.4 03/23/2020     03/23/2020     03/23/2020    POTASSIUM 4.1 03/23/2020    CHLORIDE 100 03/23/2020    CO2 26 03/23/2020    BUN 18 03/23/2020    CR  "1.65 (H) 03/23/2020     (H) 03/23/2020    YENY 9.4 03/23/2020    MAG 1.6 09/03/2015    ALBUMIN 4.3 03/23/2020    PROTTOTAL 8.1 03/23/2020    ALT 75 (H) 03/23/2020    AST 68 (H) 03/23/2020    ALKPHOS 179 (H) 03/23/2020    BILITOTAL 0.8 03/23/2020    BILIDIRECT 0.1 04/23/2012    LIPASE 200 03/23/2020    INR 0.92 05/07/2016    T4 0.5 (L) 08/17/2012    HCG Negative 03/24/2020    HCGS Negative 02/14/2017       Preop Vitals  BP Readings from Last 3 Encounters:   03/24/20 118/68   12/02/19 (!) 134/103   11/24/19 (!) 114/90    Pulse Readings from Last 3 Encounters:   03/24/20 95   12/02/19 82   11/24/19 84      Resp Readings from Last 3 Encounters:   03/24/20 16   12/02/19 16   11/24/19 20    SpO2 Readings from Last 3 Encounters:   03/24/20 97%   12/02/19 97%   11/24/19 99%      Temp Readings from Last 1 Encounters:   03/24/20 98.6  F (37  C) (Oral)    Ht Readings from Last 1 Encounters:   03/24/20 1.676 m (5' 6\")      Wt Readings from Last 1 Encounters:   03/24/20 108.9 kg (240 lb)    Estimated body mass index is 38.74 kg/m  as calculated from the following:    Height as of this encounter: 1.676 m (5' 6\").    Weight as of this encounter: 108.9 kg (240 lb).       Anesthesia Plan      History & Physical Review  History and physical reviewed and following examination; no interval change.    ASA Status:  2 .    NPO Status:  > 8 hours    Plan for General with Propofol induction. Maintenance will be Balanced.    PONV prophylaxis:  Ondansetron (or other 5HT-3)  Additional equipment: Videolaryngoscope   ETT        Postoperative Care  Postoperative pain management:  IV analgesics and Oral pain medications.      Consents  Anesthetic plan, risks, benefits and alternatives discussed with:  Patient.  Use of blood products discussed: Yes.   Use of blood products discussed with Patient.  .                 Diomedes Waters MD                    .  "

## 2020-03-25 LAB
ANION GAP SERPL CALCULATED.3IONS-SCNC: 7 MMOL/L (ref 3–14)
BACTERIA SPEC CULT: ABNORMAL
BUN SERPL-MCNC: 15 MG/DL (ref 7–30)
CALCIUM SERPL-MCNC: 8.8 MG/DL (ref 8.5–10.1)
CHLORIDE SERPL-SCNC: 105 MMOL/L (ref 94–109)
CO2 SERPL-SCNC: 25 MMOL/L (ref 20–32)
CREAT SERPL-MCNC: 1.66 MG/DL (ref 0.52–1.04)
ERYTHROCYTE [DISTWIDTH] IN BLOOD BY AUTOMATED COUNT: 14.8 % (ref 10–15)
GFR SERPL CREATININE-BSD FRML MDRD: 38 ML/MIN/{1.73_M2}
GLUCOSE SERPL-MCNC: 138 MG/DL (ref 70–99)
HCT VFR BLD AUTO: 32.3 % (ref 35–47)
HGB BLD-MCNC: 10.2 G/DL (ref 11.7–15.7)
Lab: ABNORMAL
MCH RBC QN AUTO: 30.7 PG (ref 26.5–33)
MCHC RBC AUTO-ENTMCNC: 31.6 G/DL (ref 31.5–36.5)
MCV RBC AUTO: 97 FL (ref 78–100)
PLATELET # BLD AUTO: 169 10E9/L (ref 150–450)
POTASSIUM SERPL-SCNC: 4.1 MMOL/L (ref 3.4–5.3)
RBC # BLD AUTO: 3.32 10E12/L (ref 3.8–5.2)
SODIUM SERPL-SCNC: 137 MMOL/L (ref 133–144)
SPECIMEN SOURCE: ABNORMAL
WBC # BLD AUTO: 11.2 10E9/L (ref 4–11)

## 2020-03-25 PROCEDURE — 36415 COLL VENOUS BLD VENIPUNCTURE: CPT | Performed by: UROLOGY

## 2020-03-25 PROCEDURE — 99232 SBSQ HOSP IP/OBS MODERATE 35: CPT | Performed by: INTERNAL MEDICINE

## 2020-03-25 PROCEDURE — 25000128 H RX IP 250 OP 636: Performed by: UROLOGY

## 2020-03-25 PROCEDURE — G0378 HOSPITAL OBSERVATION PER HR: HCPCS

## 2020-03-25 PROCEDURE — 25000132 ZZH RX MED GY IP 250 OP 250 PS 637: Performed by: INTERNAL MEDICINE

## 2020-03-25 PROCEDURE — 12000000 ZZH R&B MED SURG/OB

## 2020-03-25 PROCEDURE — 25800030 ZZH RX IP 258 OP 636: Performed by: INTERNAL MEDICINE

## 2020-03-25 PROCEDURE — 85027 COMPLETE CBC AUTOMATED: CPT | Performed by: UROLOGY

## 2020-03-25 PROCEDURE — 80048 BASIC METABOLIC PNL TOTAL CA: CPT | Performed by: UROLOGY

## 2020-03-25 PROCEDURE — 25000132 ZZH RX MED GY IP 250 OP 250 PS 637: Performed by: UROLOGY

## 2020-03-25 RX ORDER — GLIPIZIDE 10 MG/1
2 TABLET ORAL
Status: DISCONTINUED | OUTPATIENT
Start: 2020-03-25 | End: 2020-03-26 | Stop reason: HOSPADM

## 2020-03-25 RX ORDER — TRAMADOL HYDROCHLORIDE 50 MG/1
50 TABLET ORAL EVERY 6 HOURS PRN
Status: DISCONTINUED | OUTPATIENT
Start: 2020-03-25 | End: 2020-03-26 | Stop reason: HOSPADM

## 2020-03-25 RX ORDER — POLYETHYLENE GLYCOL 3350 17 G/17G
17 POWDER, FOR SOLUTION ORAL 2 TIMES DAILY PRN
Status: DISCONTINUED | OUTPATIENT
Start: 2020-03-25 | End: 2020-03-26 | Stop reason: HOSPADM

## 2020-03-25 RX ORDER — SENNOSIDES 8.6 MG
1-2 TABLET ORAL 2 TIMES DAILY PRN
Status: DISCONTINUED | OUTPATIENT
Start: 2020-03-25 | End: 2020-03-26 | Stop reason: HOSPADM

## 2020-03-25 RX ADMIN — ACETAMINOPHEN 650 MG: 325 TABLET, FILM COATED ORAL at 09:12

## 2020-03-25 RX ADMIN — CEFTRIAXONE 1 G: 1 INJECTION, POWDER, FOR SOLUTION INTRAMUSCULAR; INTRAVENOUS at 21:13

## 2020-03-25 RX ADMIN — DEXTRAN 70, GLYCERIN, HYPROMELLOSE 2 DROP: 1; 2; 3 SOLUTION/ DROPS OPHTHALMIC at 21:24

## 2020-03-25 RX ADMIN — METOPROLOL TARTRATE 50 MG: 50 TABLET, FILM COATED ORAL at 20:00

## 2020-03-25 RX ADMIN — LEVOTHYROXINE SODIUM 250 MCG: 125 TABLET ORAL at 08:00

## 2020-03-25 RX ADMIN — OMEPRAZOLE 20 MG: 20 CAPSULE, DELAYED RELEASE ORAL at 07:58

## 2020-03-25 RX ADMIN — TRAMADOL HYDROCHLORIDE 50 MG: 50 TABLET, FILM COATED ORAL at 12:56

## 2020-03-25 RX ADMIN — ACETAMINOPHEN 650 MG: 325 TABLET, FILM COATED ORAL at 13:18

## 2020-03-25 RX ADMIN — SENNOSIDES 1 TABLET: 8.6 TABLET, FILM COATED ORAL at 07:59

## 2020-03-25 RX ADMIN — Medication 1 LOZENGE: at 01:08

## 2020-03-25 RX ADMIN — HYDROMORPHONE HYDROCHLORIDE 0.5 MG: 1 INJECTION, SOLUTION INTRAMUSCULAR; INTRAVENOUS; SUBCUTANEOUS at 19:55

## 2020-03-25 RX ADMIN — SODIUM CHLORIDE, POTASSIUM CHLORIDE, SODIUM LACTATE AND CALCIUM CHLORIDE: 600; 310; 30; 20 INJECTION, SOLUTION INTRAVENOUS at 05:22

## 2020-03-25 RX ADMIN — HYDROMORPHONE HYDROCHLORIDE 0.3 MG: 1 INJECTION, SOLUTION INTRAMUSCULAR; INTRAVENOUS; SUBCUTANEOUS at 01:03

## 2020-03-25 RX ADMIN — METOPROLOL TARTRATE 50 MG: 50 TABLET, FILM COATED ORAL at 12:56

## 2020-03-25 ASSESSMENT — ACTIVITIES OF DAILY LIVING (ADL)
ADLS_ACUITY_SCORE: 12
ADLS_ACUITY_SCORE: 12

## 2020-03-25 NOTE — PLAN OF CARE
PRIMARY DIAGNOSIS: URINARY  TRACT INFECTION  OUTPATIENT/OBSERVATION GOALS TO BE MET BEFORE DISCHARGE:  Vitals sign stable or return to baseline: Yes    Tolerating oral antibiotics or has home infusion set up if applicable: No    Pain status: Improved but still requiring IV narcotics.    Return to near baseline physical activity: Yes    Discharge Planner Nurse   Safe discharge environment identified: Yes  Barriers to discharge: Yes (IV abx)       Entered by: Maribell Suarez 03/25/2020 4:46 AM     Please review provider order for any additional goals.     Nurse to notify provider when observation goals have been met and patient is ready for discharge.

## 2020-03-25 NOTE — PROGRESS NOTES
PRIMARY DIAGNOSIS: ACUTE RENAL COLIC    OUTPATIENT/OBSERVATION GOALS TO BE MET BEFORE DISCHARGE  1. Pain Status: Pain free. (occasional c/o sore throat r/t today's procedure; PRN lozenge helps w/ pain relief)    2. Tolerating adequate PO diet: Yes    3. Surgical Intervention planned: Stent placed today to right    4. Cleared by consultants (if involved): No, staying overnight for continued IV abx    5. Return to near baseline physical activity: Yes    Discharge Planner Nurse   Safe discharge environment identified: Yes  Barriers to discharge: Yes       Entered by: Denia Benedict 03/24/2020 7:08 PM     Please review provider order for any additional goals.   Nurse to notify provider when observation goals have been met and patient is ready for discharge.

## 2020-03-25 NOTE — PROGRESS NOTES
[facility]  Hospitalist Progress Note  Yajaira García MD 03/25/20   Text Page  Pager: [phone] (7am-6pm)    Reason for Stay (Diagnosis): ureterolithiasis and UTI         Assessment and Plan:      Summary of Stay: Enohc Barrios is a 39 year old female with past medical history of hypothyroidism, GERD and nephrolithiasis who was admitted on 3/23/2020 with right flank pain and was found to have right ureterolithiasis with hydronephrosis as well as associated UTI.  Patient underwent stent placement today and was found to have purulent discharge from the right ureter so will remain in hospital for IV antibiotics.    Problem List/Assessment and Plan:     1. Right Ureterolithiasis with associated UTI s/p ALLAN stent Placement: Presented with right flank pain.  She had CT which showed a right ureterolithiasis with associated hydronephrosis.  UA was also concerning for infection and she was started on IV ceftriaxone.  Urology was consulted and she underwent cystoscopy with ALLAN stent placement.  There was purulent discharge during this procedure and she will remain on IV antibiotics. UC showing E coli, Urology recommends continued IV antibiotics until sensitivities return.  -Continue IV ceftriaxone  -Continue IV fluids  -Urology consulted, appreciate recommendations  -We will need to follow-up with her local urologist for lithotripsy after she has completed antibiotic course (2-3 weeks)  -Follow-up urine culture, plan to transition to oral antibiotics and discharge home tomorrow once sensitivities return  -Pain control with Tylenol, add tramadol    2.  Hypothyroidism: Continue Synthroid.    3.  GERD: Continue omeprazole.    4.  Hypertension: Continue metoprolol with hold parameters.    5.  Acute on chronic kidney disease: Patient's baseline creatinine approximately 1.2-1.4.  Creatinine was elevated to 1.65 on admission, remains stable so will continue IVF.  This was in the setting of ureterolithiasis with  "hydronephrosis.  She has had cystoscopy and stent placement as above.  Continue IV fluids and repeat BMP tomorrow.  Avoid NSAIDs.  -Continue IV fluids and repeat BMP tomorrow    6.  Fatty liver: Patient has mild transaminitis.  She has fatty liver is seen on imaging.  She does need to follow-up with primary care for monitoring of this.    Diet: Regular Diet Adult    DVT Prophylaxis: Low Risk/Ambulatory with no VTE prophylaxis indicated  Erazo Catheter: not present  Code Status: Full Code      Disposition Plan   Expected discharge: Tomorrow, recommended to prior living arrangement once antibiotic plan established.  Entered: Yajaira García MD 03/25/2020, 10:22 AM       The patient's care was discussed with the Bedside Nurse and Patient.    Hospitalist Service  [facility]          Interval History (Subjective):      Patient states overall she has less pain in the right flank but it is still bothersome.  She feels extremely fatigued.  She basically did not sleep for 2 days and feels very behind on sleep.  She did eat breakfast without abdominal pain, nausea or vomiting.  Denies chest pain or shortness of breath.                  Physical Exam:      Last Vital Signs:  /69 (BP Location: Right arm)   Pulse 97   Temp 98.6  F (37  C) (Oral)   Resp 16   Ht 1.676 m (5' 6\")   Wt 108.9 kg (240 lb)   LMP 02/10/2020 (Within Days)   SpO2 95%   BMI 38.74 kg/m      General: Alert, awake, no acute distress appears extremely fatigued  HEENT: Normocephalic and atraumatic, eyes anicteric and without scleral injection, EOMI, face symmetric, MMM.  Cardiac: RRR, normal S1, S2. No m/g/r, no LE edema.  Pulmonary: Normal chest rise, normal work of breathing.  Lungs CTAB without crackles or wheezing.  Abdomen: soft, non-tender, non-distended.  Normoactive bowel sounds, no guarding or rebound tenderness.  Does have pain in the right flank region.  Extremities: no deformities.  Warm, well perfused.  Skin: no rashes " or lesions.  Warm and Dry.  Neuro: No focal deficits.  Speech clear.  Coordination and strength grossly normal.  Psych: Alert and oriented x3. Appropriate affect.         Medications:      All current medications were reviewed with changes reflected in problem list.         Data:      All new lab and imaging data was reviewed.   Labs:  Recent Labs   Lab 03/25/20  0628 03/23/20  2335    133   POTASSIUM 4.1 4.1   CHLORIDE 105 100   CO2 25 26   ANIONGAP 7 7   * 133*   BUN 15 18   CR 1.66* 1.65*   GFRESTIMATED 38* 38*   GFRESTBLACK 44* 44*   YENY 8.8 9.4     Recent Labs   Lab 03/25/20  0628 03/23/20  2335   WBC 11.2* 14.9*   HGB 10.2* 11.7   HCT 32.3* 37.4   MCV 97 99    167      Imaging:   Recent Results (from the past 24 hour(s))   XR Surgery PINKY L/T 5 Min Fluoro w Stills    Narrative    This exam was marked as non-reportable because it will not be read by a   radiologist or a [place] non-radiologist provider.             Yajaira García MD

## 2020-03-25 NOTE — PLAN OF CARE
PRIMARY DIAGNOSIS: URINARY TRACT INFECTION  OUTPATIENT/OBSERVATION GOALS TO BE MET BEFORE DISCHARGE:  1. Vitals sign stable or return to baseline: Yes    2. Tolerating oral antibiotics or has home infusion set up if applicable: No    3. Pain status: Improved but still requiring IV narcotics.    4. Return to near baseline physical activity: Yes    Discharge Planner Nurse   Safe discharge environment identified: Yes  Barriers to discharge: Yes (Needs abx discharge plan)       Entered by: Maribell Suarez 03/25/2020 6:08 AM     Please review provider order for any additional goals.     Nurse to notify provider when observation goals have been met and patient is ready for discharge.    VSS afebile, A&O x4. Ambulates SBA. Pt on 1L NC. IVF infusing at 100ml/hr. Pt c/o pain 5/10, pain improved with administration of IV Dilaudid. IV Rocephin for UTI. Straining urine. Plan to discharge contingent on abx plan. Will continue plan of care.

## 2020-03-25 NOTE — PROGRESS NOTES
[facility] Progress Note          Assessment and Plan:   Active Problems:    Right ureteral stone, UTI, pyonephrosis    Assessment: POD 1 cysto, right ureteral stent    Plan: Await final C&S and send home on 2 weeks of PO abx.  Asked her to call her primary urologist to get follow up.   Reviewed with Dr. García. Will sign off. Please call if questions.    Monica Ibarra PA-C  [facility]  [phone]               Interval History:   doing well; Voiding, no hematuria, some frequency.              Review of Systems:   The 5 point Review of Systems is negative other than noted in the HPI             Medications:     Current Facility-Administered Medications Ordered in Epic   Medication Dose Route Frequency Last Rate Last Dose     acetaminophen (TYLENOL) tablet 650 mg  650 mg Oral Q4H PRN   650 mg at 03/25/20 0912     alum & mag hydroxide-simethicone (MAALOX  ES) suspension 5 mL  5 mL Oral Q4H PRN         benzocaine-menthol (CHLORASEPTIC) 6-10 MG lozenge 1 lozenge  1 lozenge Buccal Q1H PRN   1 lozenge at 03/25/20 0108     calcium carbonate (TUMS) chewable tablet 500 mg  500 mg Oral Daily PRN         cefTRIAXone (ROCEPHIN) 1 g vial to attach to  mL bag for ADULTS or NS 50 mL bag for PEDS  1 g Intravenous Q24H   1 g at 03/24/20 2135     HYDROmorphone (PF) (DILAUDID) injection 0.3-0.5 mg  0.3-0.5 mg Intravenous Q2H PRN   0.3 mg at 03/25/20 0103     lactated ringers infusion   Intravenous Continuous 100 mL/hr at 03/25/20 0756       levothyroxine (SYNTHROID/LEVOTHROID) tablet 250 mcg  250 mcg Oral Daily   250 mcg at 03/25/20 0800     lidocaine (LMX4) cream   Topical Q1H PRN         lidocaine 1 % 0.1-1 mL  0.1-1 mL Other Q1H PRN   50 mg at 03/24/20 1106     melatonin tablet 1 mg  1 mg Oral At Bedtime PRN         metoprolol tartrate (LOPRESSOR) tablet 50 mg  50 mg Oral BID   Stopped at 03/25/20 0800     naloxone (NARCAN) injection 0.1-0.4 mg  0.1-0.4 mg Intravenous Q2 Min PRN          omeprazole (priLOSEC) CR capsule 20 mg  20 mg Oral Daily   20 mg at 03/25/20 0758     ondansetron (ZOFRAN-ODT) ODT tab 4 mg  4 mg Oral Q6H PRN        Or     ondansetron (ZOFRAN) injection 4 mg  4 mg Intravenous Q6H PRN         Patient RECEIVING antibiotic to treat a different condition and it provides ADEQUATE COVERAGE for this surgical procedure.  1 each As instructed Continuous         phenol (CHLORASEPTIC) 1.4 % spray 1 mL  1 spray Mouth/Throat Q1H PRN         polyethylene glycol (MIRALAX) Packet 17 g  17 g Oral BID PRN         prochlorperazine (COMPAZINE) injection 5 mg  5 mg Intravenous Q6H PRN        Or     prochlorperazine (COMPAZINE) tablet 5 mg  5 mg Oral Q6H PRN        Or     prochlorperazine (COMPAZINE) Suppository 25 mg  25 mg Rectal Q12H PRN         sennosides (SENOKOT) tablet 1-2 tablet  1-2 tablet Oral BID PRN   1 tablet at 03/25/20 0759     sodium chloride (PF) 0.9% PF flush 3 mL  3 mL Intracatheter q1 min prn   3 mL at 03/24/20 2213     sodium chloride (PF) 0.9% PF flush 3 mL  3 mL Intracatheter Q8H         No current [facility]-ordered outpatient medications on file.                  Physical Exam:   Vitals were reviewed  Patient Vitals for the past 8 hrs:   BP Temp Temp src Heart Rate Resp SpO2   03/25/20 0912 -- -- -- -- -- 95 %   03/25/20 0800 -- -- -- 74 16 93 %   03/25/20 0752 -- -- -- -- 13 97 %   03/25/20 0737 119/69 98.6  F (37  C) Oral 71 12 97 %   03/25/20 0337 120/81 97.9  F (36.6  C) Oral 71 16 94 %     GEN: NAD, lying in bed  EYES: EOMI  MOUTH: MMM  NECK: Supple  RESP: Unlabored breathing  CARDIAC: No LE edema  SKIN: Warm  ABD: soft  NEURO: AAO           Data:   No results found for: NTBNPI, NTBNP  Lab Results   Component Value Date    WBC 11.2 (H) 03/25/2020    WBC 14.9 (H) 03/23/2020    WBC 9.4 03/01/2018    HGB 10.2 (L) 03/25/2020    HGB 11.7 03/23/2020    HGB 11.9 03/01/2018    HCT 32.3 (L) 03/25/2020    HCT 37.4 03/23/2020    HCT 36.9 03/01/2018    MCV 97 03/25/2020    MCV 99 03/23/2020     MCV 94 03/01/2018     03/25/2020     03/23/2020     03/01/2018     Lab Results   Component Value Date    INR 0.92 05/07/2016    INR 0.95 09/03/2015

## 2020-03-25 NOTE — PROGRESS NOTES
PRIMARY DIAGNOSIS: URINARY TRACT INFECTION  OUTPATIENT/OBSERVATION GOALS TO BE MET BEFORE DISCHARGE:  1. Vitals sign stable or return to baseline: Yes     2. Tolerating oral antibiotics or has home infusion set up if applicable: No, continue IVAB until cx back     3. Pain status: Improved w/tylenol to 2/10  .     4. Return to near baseline physical activity: Yes, Rt foot discomfort due to old fx      Discharge Planner Nurse   Safe discharge environment identified: Yes, home  Barriers to discharge: Yes (Needs abx discharge plan)    Please review provider order for any additional goals.      Nurse to notify provider when observation goals have been met and patient is ready for discharge.    Straining urine, denied s/sx of current UTI. Continue IVF. PO intake well. Afebrile. Ambulated in halls w/RN writer - activity to baseline. Up independent in room.

## 2020-03-25 NOTE — PROGRESS NOTES
PRIMARY DIAGNOSIS: URINARY TRACT INFECTION  OUTPATIENT/OBSERVATION GOALS TO BE MET BEFORE DISCHARGE:  1. Vitals sign stable or return to baseline: morning metoprolol held due to parameters not met..      2. Tolerating oral antibiotics or has home infusion set up if applicable: No, continue IVAB until cx back     4. Pain status: Improved w/tylenol to 2/10  .Tramadol given for R flank soreness.   5. Return to near baseline physical activity: Yes, Rt foot discomfort due to old fx      Discharge Planner Nurse   Safe discharge environment identified: Yes, home  Barriers to discharge: Yes (Needs abx discharge plan)     Please review provider order for any additional goals.      Nurse to notify provider when observation goals have been met and patient is ready for discharge.

## 2020-03-25 NOTE — PLAN OF CARE
A/O x 4. VSS on 1L O2 per NC. C/O intermittent throat pain/discomfort; PRN chloraseptic lozenges given w/ relief. PRN IV dilaudid 0.3 mg given for 4/10 right flank pain w/ relief. No tele, denied CP . LS clear, no SOB reported. PIV to left intact, infusing w/ LR @ 100 ml/hr. Up SBA in room, voiding well. Tolerating regular diet w/ good appetite, denies nausea. Possible discharge home 3/25. Will continue POC.     PRIMARY DIAGNOSIS: ACUTE RENAL COLIC    OUTPATIENT/OBSERVATION GOALS TO BE MET BEFORE DISCHARGE  1. Pain Status: Improved but still requiring IV narcotics.    2. Tolerating adequate PO diet: Yes    3. Surgical Intervention planned: Stent placed today to right side    4. Cleared by consultants (if involved): No, continuing IV abx overnight    5. Return to near baseline physical activity: Yes    Discharge Planner Nurse   Safe discharge environment identified: Yes  Barriers to discharge: Yes       Entered by: Denia Benedict 03/24/2020 10:49 PM     Please review provider order for any additional goals.   Nurse to notify provider when observation goals have been met and patient is ready for discharge.

## 2020-03-26 VITALS
SYSTOLIC BLOOD PRESSURE: 123 MMHG | TEMPERATURE: 97.9 F | HEART RATE: 97 BPM | WEIGHT: 240 LBS | RESPIRATION RATE: 20 BRPM | DIASTOLIC BLOOD PRESSURE: 79 MMHG | BODY MASS INDEX: 38.57 KG/M2 | HEIGHT: 66 IN | OXYGEN SATURATION: 93 %

## 2020-03-26 LAB
ANION GAP SERPL CALCULATED.3IONS-SCNC: 4 MMOL/L (ref 3–14)
BUN SERPL-MCNC: 18 MG/DL (ref 7–30)
CALCIUM SERPL-MCNC: 8.5 MG/DL (ref 8.5–10.1)
CHLORIDE SERPL-SCNC: 107 MMOL/L (ref 94–109)
CO2 SERPL-SCNC: 26 MMOL/L (ref 20–32)
CREAT SERPL-MCNC: 1.49 MG/DL (ref 0.52–1.04)
GFR SERPL CREATININE-BSD FRML MDRD: 43 ML/MIN/{1.73_M2}
GLUCOSE SERPL-MCNC: 114 MG/DL (ref 70–99)
POTASSIUM SERPL-SCNC: 4 MMOL/L (ref 3.4–5.3)
SODIUM SERPL-SCNC: 137 MMOL/L (ref 133–144)

## 2020-03-26 PROCEDURE — 25000132 ZZH RX MED GY IP 250 OP 250 PS 637: Performed by: INTERNAL MEDICINE

## 2020-03-26 PROCEDURE — 36415 COLL VENOUS BLD VENIPUNCTURE: CPT | Performed by: INTERNAL MEDICINE

## 2020-03-26 PROCEDURE — 80048 BASIC METABOLIC PNL TOTAL CA: CPT | Performed by: INTERNAL MEDICINE

## 2020-03-26 PROCEDURE — 25800030 ZZH RX IP 258 OP 636: Performed by: INTERNAL MEDICINE

## 2020-03-26 PROCEDURE — 99239 HOSP IP/OBS DSCHRG MGMT >30: CPT | Performed by: INTERNAL MEDICINE

## 2020-03-26 RX ORDER — OXYCODONE HYDROCHLORIDE 5 MG/1
5 TABLET ORAL EVERY 8 HOURS PRN
Qty: 6 TABLET | Refills: 0 | Status: SHIPPED | OUTPATIENT
Start: 2020-03-26 | End: 2023-08-16

## 2020-03-26 RX ORDER — OXYCODONE HYDROCHLORIDE 5 MG/1
5 TABLET ORAL EVERY 4 HOURS PRN
Status: DISCONTINUED | OUTPATIENT
Start: 2020-03-26 | End: 2020-03-26 | Stop reason: HOSPADM

## 2020-03-26 RX ORDER — SENNOSIDES A AND B 8.6 MG/1
1-2 TABLET, FILM COATED ORAL 2 TIMES DAILY PRN
Qty: 30 TABLET | Refills: 0 | Status: SHIPPED | OUTPATIENT
Start: 2020-03-26

## 2020-03-26 RX ORDER — SULFAMETHOXAZOLE/TRIMETHOPRIM 800-160 MG
1 TABLET ORAL 2 TIMES DAILY
Qty: 28 TABLET | Refills: 0 | Status: SHIPPED | OUTPATIENT
Start: 2020-03-26 | End: 2020-04-09

## 2020-03-26 RX ADMIN — OXYCODONE HYDROCHLORIDE 5 MG: 5 TABLET ORAL at 01:10

## 2020-03-26 RX ADMIN — SODIUM CHLORIDE, POTASSIUM CHLORIDE, SODIUM LACTATE AND CALCIUM CHLORIDE: 600; 310; 30; 20 INJECTION, SOLUTION INTRAVENOUS at 01:13

## 2020-03-26 RX ADMIN — LEVOTHYROXINE SODIUM 250 MCG: 125 TABLET ORAL at 07:49

## 2020-03-26 RX ADMIN — Medication 1 LOZENGE: at 06:16

## 2020-03-26 RX ADMIN — DEXTRAN 70, GLYCERIN, HYPROMELLOSE 2 DROP: 1; 2; 3 SOLUTION/ DROPS OPHTHALMIC at 08:21

## 2020-03-26 RX ADMIN — OMEPRAZOLE 20 MG: 20 CAPSULE, DELAYED RELEASE ORAL at 07:49

## 2020-03-26 RX ADMIN — METOPROLOL TARTRATE 50 MG: 50 TABLET, FILM COATED ORAL at 07:49

## 2020-03-26 RX ADMIN — SENNOSIDES 2 TABLET: 8.6 TABLET, FILM COATED ORAL at 08:21

## 2020-03-26 ASSESSMENT — ACTIVITIES OF DAILY LIVING (ADL)
ADLS_ACUITY_SCORE: 10

## 2020-03-26 NOTE — PROGRESS NOTES
PRIMARY DIAGNOSIS: ACUTE RENAL COLIC    OUTPATIENT/OBSERVATION GOALS TO BE MET BEFORE DISCHARGE  1. Pain Status: Improved-controlled with oral pain medications.    2. Tolerating adequate PO diet: Yes    3. Surgical Intervention planned: Had stent put in place 2/25.    4. Cleared by consultants (if involved): No    5. Return to near baseline physical activity: Yes    Discharge Planner Nurse   Safe discharge environment identified: Yes  Barriers to discharge: Yes       Entered by: Gay Healy 03/26/2020 1:46 AM     Please review provider order for any additional goals.   Nurse to notify provider when observation goals have been met and patient is ready for discharge.

## 2020-03-26 NOTE — PLAN OF CARE
"PRIMARY DIAGNOSIS: \"GENERIC\" NURSING  OUTPATIENT/OBSERVATION GOALS TO BE MET BEFORE DISCHARGE:  1. ADLs back to baseline: Yes    2. Activity and level of assistance: Ambulating independently.    3. Pain status: Improved but still requiring IV narcotics.    4. Return to near baseline physical activity: Yes     Discharge Planner Nurse   Safe discharge environment identified: Yes  Barriers to discharge: No       Entered by: Maura Reyes 03/25/2020 11:07 PM     Please review provider order for any additional goals.   Nurse to notify provider when observation goals have been met and patient is ready for discharge.  Orientation: A/O x4  VS: VSS, afebrile  LS:Clear bilat  GI: tolerating reg diet  : voiding w/o difficulty, straining urine  Skin: intact- tattoos stretch marks  Activity: independent  Pain: 5/10  Lines: PIV SL  Maura Reyes RN on 3/25/2020 at 11:05 PM      "

## 2020-03-26 NOTE — PROGRESS NOTES
PRIMARY DIAGNOSIS: ACUTE RENAL COLIC    OUTPATIENT/OBSERVATION GOALS TO BE MET BEFORE DISCHARGE  1. Pain Status: Improved-controlled with oral pain medications.    2. Tolerating adequate PO diet: Yes    3. Surgical Intervention planned: Stent already placed.    4. Cleared by consultants (if involved): No    5. Return to near baseline physical activity: Yes    Discharge Planner Nurse   Safe discharge environment identified: Yes  Barriers to discharge: Yes       Entered by: Gay Healy 03/26/2020 5:07 AM     Please review provider order for any additional goals.   Nurse to notify provider when observation goals have been met and patient is ready for discharge.

## 2020-03-26 NOTE — DISCHARGE SUMMARY
[facility]  Discharge Summary  Name: Enoch Barrios    MRN: [id number]  YOB: 1980    Age: 39 year old  Date of Discharge:  3/26/2020  Date of Admission: 3/23/2020  Primary Care Provider: [facility], [place]  Discharge Physician:  Yajaira García MD  Discharging Service:  Hospitalist      Discharge Diagnoses:  1. Right Ureterolithiasis with associated UTI s/p ALLAN stent placement  2. Hypothyroidism  3. GERD  4. Acute on Chronic Kidney Disease  5. Fatty Liver     Follow-ups Needed After Discharge   1. Needs repeat BMP on 3/30/20  2. Follow up with [facility] in 2-3 weeks    Unresulted Labs Ordered in the Past 30 Days of this Admission     No orders found from 10/16/2018 to 12/16/2018.        Hospital Course:  Enoch Barrios is a 39 year old female with past medical history of hypothyroidism, GERD and nephrolithiasis who was admitted on 3/23/2020 with right flank pain and was found to have right ureterolithiasis with hydronephrosis as well as associated UTI.  Patient underwent stent placement and was found to have purulent discharge from the right ureter.  Ultimately found to have E coli UTI.  Will complete antibiotics and follow up with Urology in the outpatient setting.      1. Right Ureterolithiasis with associated UTI s/p ALLAN stent Placement: Presented with right flank pain.  She had CT which showed a right ureterolithiasis with associated hydronephrosis.  UA was also concerning for infection and she was started on IV ceftriaxone.  Urology was consulted and she underwent cystoscopy with ALLAN stent placement.  There was purulent discharge during this procedure and she was treated with IV Ceftriaxone while hospitalized.  Upon discharge will be on Bactrim for 2 weeks based on sensitivities for her E coli UTI.  I recommended she have repeat BMP on 3/30 to ensure stable renal function and potassium.  This was discussed extensively with patient on day of discharge.  She can use Tylenol  "as well as Oxycodone for severe pain (minimize this as much as possible).  Instructed to avoid NSAIDS.  Follow-up with her local urologist ([facility]) for lithotripsy after she has completed antibiotic course (2-3 weeks).     2.  Hypothyroidism: Continue Synthroid.     3.  GERD: Continue omeprazole.     4.  Hypertension: Continue metoprolol.     5.  Acute on chronic kidney disease: Patient's baseline creatinine approximately 1.2-1.4.  Creatinine was elevated to 1.65 on admission.  This was in the setting of ureterolithiasis with hydronephrosis.  She has had cystoscopy and stent placement as above.  On day of discharge creatinine had improved to 1.49.  She was instructed to avoid NSAIDS.  Repeat BMP recommended for 3/30 as above.     6.  Fatty liver: Patient has mild transaminitis.  She has fatty liver is seen on imaging.  She does need to follow-up with primary care for monitoring of this.     Discharge Disposition:  Discharged to home     Allergies:  No Known Allergies     Condition on Discharge:  Discharge condition: Stable   Discharge vitals: Blood pressure 123/79, pulse 97, temperature 97.9  F (36.6  C), temperature source Oral, resp. rate 20, height 1.676 m (5' 6\"), weight 108.9 kg (240 lb), last menstrual period 02/10/2020, SpO2 93 %, unknown if currently breastfeeding.   Code status on discharge: Full Code     History of Illness:  See detailed admission note for full details.    Physical Exam:  Blood pressure 123/79, pulse 97, temperature 97.9  F (36.6  C), temperature source Oral, resp. rate 20, height 1.676 m (5' 6\"), weight 108.9 kg (240 lb), last menstrual period 02/10/2020, SpO2 93 %, unknown if currently breastfeeding.  Wt Readings from Last 1 Encounters:   03/24/20 108.9 kg (240 lb)     General: Alert, awake, no acute distress.  HEENT: Normocephalic, atraumatic, eyes anicteric and without scleral injection, EOMI, MMM.  Cardiac: RRR, normal S1, S2.  No m/g/r. No LE edema.  Pulmonary: Normal chest " rise, normal work of breathing.  Lungs CTAB  Abdomen: soft, non-tender, non-distended.  Normoactive BS.  No guarding or rebound tenderness.  Extremities: no deformities.  Warm, well perfused.  Skin: no rashes or lesions noted.  Warm and Dry.  Neuro: No focal deficits noted.  Speech clear.  Coordination and strength grossly normal.  Psych: Appropriate affect. Alert and oriented x3    Procedures other than Imaging:  Right ALLAN stent placement  IV antibiotics     Imaging:  Results for orders placed or performed during the hospital encounter of 03/23/20   Abd/pelvis CT no contrast - Stone Protocol    Narrative    EXAM: CT ABDOMEN PELVIS W/O CONTRAST  LOCATION: [facility]  DATE/TIME: 3/24/2020 12:26 AM    INDICATION: Flank pain. Suspected ureteral stone.    COMPARISON: Ultrasound 3/24/2020.    TECHNIQUE: CT scan of the abdomen and pelvis was performed without IV contrast. Multiplanar reformats were obtained. Dose reduction techniques were used.    CONTRAST: None.    FINDINGS:   LOWER CHEST: Normal.    HEPATOBILIARY: Diffuse fatty infiltration of the liver. No radiopaque gallstones or bile duct dilatation.     PANCREAS: Normal.    SPLEEN: Normal.    ADRENAL GLANDS: Normal.    KIDNEYS/BLADDER: Marked right hydronephrosis due to two stones in the distal ureter including a 10 mm stone in the ureter at the level of the midpelvis and an 8 mm stone distal to this, just above the UVJ. A few tiny bilateral renal calculi. Right renal   cortical thinning. Scarring upper pole left kidney.    BOWEL: Colonic diverticula without diverticulitis. No bowel obstruction.    LYMPH NODES: Normal.    VASCULATURE: Unremarkable.    PELVIC ORGANS: Uterus is present. No suspicious pelvic masses. No significant ascites.    OTHER: Evaluation of the solid abdominal organs is limited by lack of contrast. Small fat-containing periumbilical hernia.    MUSCULOSKELETAL: Normal.      Impression    IMPRESSION:   1.  Marked right hydronephrosis  due to two stones in the distal ureter measuring 10 mm and 8 mm.    2.  Tiny renal calculi.    3.  Fatty liver.     Abdomen US, limited (RUQ only)    Narrative    EXAM: US ABDOMEN LIMITED  LOCATION: [facility]  DATE/TIME: 3/24/2020 12:06 AM    INDICATION: Right flank pain.    COMPARISON: CT 3/24/2020.    TECHNIQUE: Limited abdominal ultrasound.    FINDINGS:  GALLBLADDER: Normal. No gallstones, wall thickening or pericholecystic fluid. Negative sonographic Costa's sign.    BILE DUCTS: No biliary dilatation. The common duct measures 5 mm.    LIVER: Increased liver echogenicity suggesting fatty infiltration. Otherwise negative.    RIGHT KIDNEY: Marked right hydronephrosis as seen on the CT.    PANCREAS: Obscured by gas and not diagnostically visualized.    No ascites.      Impression    IMPRESSION:  1.  Marked right hydronephrosis.    2.  No other acute findings.    3.  Fatty infiltration of the liver.     XR Surgery PINKY L/T 5 Min Fluoro w Stills    Narrative    This exam was marked as non-reportable because it will not be read by a   radiologist or a [facility] non-radiologist provider.                Consultations:  Consultations This Hospital Stay   UROLOGY IP CONSULT     Recent Lab Results:  Recent Labs   Lab 03/25/20  0628 03/23/20  2335   WBC 11.2* 14.9*   HGB 10.2* 11.7   HCT 32.3* 37.4   MCV 97 99    167     Recent Labs   Lab 03/26/20  0642 03/25/20  0628 03/23/20  2335    137 133   POTASSIUM 4.0 4.1 4.1   CHLORIDE 107 105 100   CO2 26 25 26   ANIONGAP 4 7 7   * 138* 133*   BUN 18 15 18   CR 1.49* 1.66* 1.65*   GFRESTIMATED 43* 38* 38*   GFRESTBLACK 50* 44* 44*   YENY 8.5 8.8 9.4          Pending Results:    Unresulted Labs Ordered in the Past 30 Days of this Admission     No orders found from 2/22/2020 to 3/24/2020.           Discharge Instructions and Follow-Up:   Discharge Orders      Follow-up and recommended labs and tests     1. You need to have a BMP (blood test to check  kidney function and potassium) on Monday, 3/30.  This is because you are on Bactrim to treat your infection.  This is the best antibiotic to treat your infection based on the sensitivity results but can lead to kidney dysfunction and elevated potassium which is the reason for close follow up and blood testing.  2. You need to follow up with [facility] in the next 2-3 weeks.     Activity    Your activity upon discharge: activity as tolerated     Reason for your hospital stay    You were hospitalized for a kidney stone and urinary tract infection. You had a stent placed by Urology.  You will need to follow up with your Urologist through [facility] in 2-3 weeks.  You will be going home on Bactrim to treat your infection.  It is imperative that you get your kidney function tested on Monday (3/30) to ensure that your kidney function and potassium are remaining stable on this medication.  You can take Tylenol as needed for pain.  Avoid NSAIDs (Ibuprofen, Naproxen) as these can worsen kidney function.  Oxycodone can be used sparingly for significant pain.     Full Code     Diet    Follow this diet upon discharge: Orders Placed This Encounter      Regular Diet Adult. Make sure to drink plenty of fluids.     Discharge Medications   Current Discharge Medication List      START taking these medications    Details   oxyCODONE (ROXICODONE) 5 MG tablet Take 1 tablet (5 mg) by mouth every 8 hours as needed for moderate to severe pain  Qty: 6 tablet, Refills: 0    Associated Diagnoses: Kidney stone      senna (SENOKOT) 8.6 MG tablet Take 1-2 tablets by mouth 2 times daily as needed for constipation  Qty: 30 tablet, Refills: 0    Associated Diagnoses: Constipation, unspecified constipation type      sulfamethoxazole-trimethoprim (BACTRIM DS) 800-160 MG tablet Take 1 tablet by mouth 2 times daily for 14 days  Qty: 28 tablet, Refills: 0    Associated Diagnoses: Acute cystitis with hematuria; Kidney stone         CONTINUE  these medications which have NOT CHANGED    Details   acetaminophen (TYLENOL) 325 MG tablet Take 3 tablets (975 mg) by mouth every 6 hours Scheduled x48hrs, then 650-975mg every 6hrs as needed, pain. Alternate with ibuprofen  Qty: 50 tablet, Refills: 1    Associated Diagnoses: Lisfranc dislocation, right, initial encounter      Levothyroxine Sodium (SYNTHROID PO) Take 250 mcg by mouth daily       metoprolol (LOPRESSOR) 50 MG tablet Take 50 mg by mouth 2 times daily      omeprazole 20 MG tablet Take 20 mg by mouth daily         STOP taking these medications       ibuprofen (ADVIL/MOTRIN) 200 MG tablet Comments:   Reason for Stopping:               Time Spent on this Encounter   I, Yajaira García MD, personally saw the patient today and spent greater than 30 minutes discharging this patient.    Yajaira García MD

## 2020-03-26 NOTE — PROGRESS NOTES
PRIMARY DIAGNOSIS: BILIARY COLIC/UNCOMPLICATED EARLY ACUTE CHOLECYSTITIS  OUTPATIENT/OBSERVATION GOALS TO BE MET BEFORE DISCHARGE:    1. Pain status: Improved-controlled with oral pain medications.  2. Stable vital signs and labs (if performed) at disposition: Yes  3. Tolerating adequate PO diet: Yes  4. Successful cholecystectomy or clear follow up plan with General Surgery team if immediate surgery not performed Yes  5. ADLs back to baseline?  Yes  6. Activity and level of assistance: Ambulating independently.  7. Barriers to discharge noted No    Discharge Planner Nurse   Safe discharge environment identified: Yes  Barriers to discharge: No       Entered by: Ethel Dean 03/26/2020 11:22 AM     Please review provider order for any additional goals.   Nurse to notify provider when observation goals have been met and patient is ready for discharge.

## 2020-03-26 NOTE — PLAN OF CARE
A&O X4. VSS on RA. IND. IVF @ 100mL/hr. IV Rocephin. Creat elevated. C/o of R flank pain; PRN Oxycodone given X1. Straining urine; no noticeable stones this shift. Urology following. Discharge possibly today. Continue to monitor.

## 2020-03-26 NOTE — DISCHARGE SUMMARY
AVS reviewed with pt. All questions answered. Pt denies any further questions or concerns. PIV removed, no complications.  All belongings returned, including necklace from security. Pt sent home with new filled prescriptions. Pt escorted to front door by [place] [facility] staff.

## 2020-03-26 NOTE — PLAN OF CARE
Presentation/Diagnosis: Pt admitted 3/23 due to flank pain. Pt diagnosed with ureteral stone to R, acute cystitis and UTI.   History: GERD, CKD, HTN, hypothyroidism  Labs/Protocols: K: 4, Creatinine: 1.49, WBC: 15.1, Hgb: 9.4, Platelets: 130  Vitals: VSS on RA. Pt complains of mild R flank pain throughout shift. PRN oxycodone available Q 4 hours, none needed this shift.   Cardiac: WDL  Telemetry: NA  Respiratory: WDL  Neuro: A&Ox4. Calm and cooperative with cares.   GI/: Stent placed to R ureter 3/24. Straining urine. Last BM 3/24. PRN senna given this shift.   Skin: Bruising and swelling to R foot due to 2 fractures from November per pt.   LDAs: PIV to L FA, infusing LR at 100mL/hr.   Diet: Regular diet, fair appetite.   Activity: Independent  Teaching: Pt educated on plan of care.   Plan: Discharge to home today on PO bactrim and PRN PO oxycodone.    Will continue to monitor.

## 2020-03-30 ENCOUNTER — NURSE TRIAGE (OUTPATIENT)
Dept: NURSING | Facility: CLINIC | Age: 40
End: 2020-03-30

## 2020-03-30 NOTE — TELEPHONE ENCOUNTER
[facility] lab called for orders on this patient as she was supposed to get a BMP today post hospitalization from [facility]. It does not appear that any orders were placed in the [employer] system. Spoke with Enoch and she will follow up with her urology and or primary [facility] today.    Patient understood instructions.      Gunjan Navarrete RN

## 2021-04-26 NOTE — DISCHARGE INSTRUCTIONS
Abrasions  Abrasions are skin scrapes. Their treatment depends on how large and deep the abrasion is.  Home care   You may be prescribed an antibiotic cream or ointment to apply to the wound. This helps prevent infection. Follow instructions when using this medication.  General care    To care for the abrasion, do the following each day for as long as directed by your health care provider.    If you were given a bandage, change it once a day. If your bandage sticks to the wound, soak it in warm water until it loosens.    Wash the area with soap and warm water. You may do this in a sink or under a tub faucet or shower. Rinse off the soap. Then pat the area dry with a clean towel.    If antibiotic ointment or cream was prescribed, reapply it to the wound as directed. Cover the wound with a fresh non-stick bandage. If the bandage becomes wet or dirty, change it as soon as possible.    You may use acetaminophen or ibuprofen to control pain unless another pain medication was prescribed. Note: If you have chronic liver or kidney disease or ever had a stomach ulcer or GI bleeding, talk with your health care provider before using these medications. Do not use ibuprofen in children under six months of age.    Most skin wounds heal within ten days. But an infection may occur despite treatment. Therefore, monitor the wound for signs of infection as listed below.  Follow-up care  Follow up with your health care provider, or as advised.  When to seek medical advice  Call your health care provider right away if any of these occur:    Fever of 101 F (38.3 C) or higher, or as directed by your health care provider    Increasing pain, redness, swelling, or drainage from the wound    Bleeding from the wound that does not stop after a few minutes of steady, firm pressure    Decreased ability to move any body part near wound    9395-4358 [employer]. [address] [place] [address], [place] [address]. All rights reserved. This  information is not intended as a substitute for professional medical care. Always follow your healthcare professional's instructions.         * HEAD INJURY, no wake-up (Adult)    You have had a head injury. It does not appear serious at this time. Sometimes symptoms of a more serious problem (concussion, bruising or bleeding in the brain) may appear later. Therefore, watch for the warning signs listed below.  HOME CARE:    During the next 24 hours someone must stay with you to check for the signs below. It is not necessary to stay awake or be awakened during the night.    If you have swelling of the face or scalp, apply an ice pack (ice cubes in a plastic bag, wrapped in a towel) for 20 minutes. Do this every 1-2 hours until the swelling starts to go down.    You may use acetaminophen (Tylenol) 650-1000 mg every 6 hours or ibuprofen (Motrin, Advil) 600 mg every 6-8 hours with food to control pain, if you are able to take these medicines. [NOTE: If you have chronic liver or kidney disease or ever had a stomach ulcer or GI bleeding, talk with your doctor before using these medicines.] Do not take aspirin after a head injury.    For the next 24 hours:    Do not take alcohol, sedatives or medicines that make you sleepy.    Do not drive or operate machinery.    Avoid strenuous activities. No lifting or straining.    If you have had any symptoms of a concussion today (nausea, vomiting, dizziness, confusion, headache, memory loss or if you were knocked out), do not return to sports or any activity that could result in another head injury until 2 full weeks after all symptoms are gone and you have been cleared by your doctor. A second head injury before fully recovering from the first one can lead to serious brain injury.  FOLLOW UP with your doctor if symptoms are not improving after 24 hours, or as directed.  GET PROMPT MEDICAL ATTENTION if any of the following warning signs occur:    Repeated vomiting    Severe or  What Is The Patient's Gender: Female worsening headache or dizziness    Unusual drowsiness, or unable to awaken as usual    Confusion or change in behavior or speech, memory loss, blurred vision    Convulsion (seizure)    Increasing scalp or face swelling    Redness, warmth or pus from the swollen area    Fluid drainage or bleeding from the nose or ears    8177-0311 [facility], [address] [place] [address]. All rights reserved. This information is not intended as a substitute for professional medical care. Always follow your healthcare professional's instructions.

## 2021-05-31 ENCOUNTER — RECORDS - HEALTHEAST (OUTPATIENT)
Dept: ADMINISTRATIVE | Facility: CLINIC | Age: 41
End: 2021-05-31

## 2023-03-07 NOTE — ANESTHESIA POSTPROCEDURE EVALUATION
32 61 16: TRANSFER - IN REPORT:    Verbal report received from [facility] [place]  being received from [facility] ED for routine progression of patient care      Report consisted of patient's Situation, Background, Assessment and   Recommendations(SBAR). Information from the following report(s) Nurse Handoff Report, Index, ED SBAR, Intake/Output, MAR, Recent Results, and Cardiac Rhythm NSR  was reviewed with the receiving nurse. Opportunity for questions and clarification was provided. Assessment completed upon patient's arrival to unit and care assumed. 1758: Pt arrived to unit via EMS. VSS. Pt A/Ox4. Pt in NAD wearing a NRB, O2 at 99%. Pt with no complaints of pain or discomfort. Dual skin assessment performed with Jonathan Taylor RN. Pt educated on use of call bell and to call with bathroom and all other needs. 1805: RT at bedside, pt on 25L via Vapotherm, sats at 88-92%. 1815: NP at bedside. 1900: Admission Database completed. All needs addressed. Pt left with call light in reach and bed in lowest position. 1910: Bedside and Verbal shift change report given to Brynn Mota RN (oncoming nurse) by Savana Santiago RN (offgoing nurse). Report included the following information Nurse Handoff Report, Index, ED SBAR, Intake/Output, MAR, Recent Results, and Cardiac Rhythm NSR . Patient: Enoch Barrios    Procedure(s):  CYSTOSCOPY, RIGHT JJ STENT    Diagnosis:Right ureteral stone [N20.1]  Diagnosis Additional Information: No value filed.    Anesthesia Type:  General    Note:  Anesthesia Post Evaluation    Patient location during evaluation: PACU  Patient participation: Able to fully participate in evaluation  Level of consciousness: awake  Pain management: adequate  Airway patency: patent  Cardiovascular status: acceptable  Respiratory status: acceptable  Hydration status: euvolemic  PONV: controlled     Anesthetic complications: None          Last vitals:  Vitals:    03/24/20 1343 03/24/20 1350 03/24/20 1357   BP: (!) 124/95  111/69   Pulse:      Resp: 20  23   Temp: 98.7  F (37.1  C)     SpO2: 95% (!) 88% 95%         Electronically Signed By: Diomedes Waters MD  March 24, 2020  2:07 PM

## 2023-08-16 ENCOUNTER — APPOINTMENT (OUTPATIENT)
Dept: GENERAL RADIOLOGY | Facility: CLINIC | Age: 43
End: 2023-08-16
Attending: EMERGENCY MEDICINE
Payer: OTHER MISCELLANEOUS

## 2023-08-16 ENCOUNTER — HOSPITAL ENCOUNTER (EMERGENCY)
Facility: CLINIC | Age: 43
Discharge: HOME | End: 2023-08-16
Attending: EMERGENCY MEDICINE | Admitting: EMERGENCY MEDICINE
Payer: OTHER MISCELLANEOUS

## 2023-08-16 VITALS
HEART RATE: 72 BPM | DIASTOLIC BLOOD PRESSURE: 105 MMHG | OXYGEN SATURATION: 100 % | SYSTOLIC BLOOD PRESSURE: 163 MMHG | RESPIRATION RATE: 16 BRPM | TEMPERATURE: 98.4 F

## 2023-08-16 DIAGNOSIS — S82.831A CLOSED FRACTURE OF DISTAL END OF RIGHT FIBULA, UNSPECIFIED FRACTURE MORPHOLOGY, INITIAL ENCOUNTER: ICD-10-CM

## 2023-08-16 PROCEDURE — 250N000013 HC RX MED GY IP 250 OP 250 PS 637: Performed by: EMERGENCY MEDICINE

## 2023-08-16 PROCEDURE — 73610 X-RAY EXAM OF ANKLE: CPT | Mod: RT

## 2023-08-16 PROCEDURE — 27786 TREATMENT OF ANKLE FRACTURE: CPT | Mod: RT

## 2023-08-16 PROCEDURE — 99284 EMERGENCY DEPT VISIT MOD MDM: CPT | Mod: 25

## 2023-08-16 RX ORDER — OXYCODONE HYDROCHLORIDE 5 MG/1
5 TABLET ORAL EVERY 6 HOURS PRN
Qty: 14 TABLET | Refills: 0 | Status: SHIPPED | OUTPATIENT
Start: 2023-08-16

## 2023-08-16 RX ORDER — OXYCODONE HYDROCHLORIDE 5 MG/1
5 TABLET ORAL ONCE
Status: COMPLETED | OUTPATIENT
Start: 2023-08-16 | End: 2023-08-16

## 2023-08-16 RX ORDER — ACETAMINOPHEN 500 MG
1000 TABLET ORAL ONCE
Status: COMPLETED | OUTPATIENT
Start: 2023-08-16 | End: 2023-08-16

## 2023-08-16 RX ORDER — IBUPROFEN 600 MG/1
600 TABLET, FILM COATED ORAL ONCE
Status: COMPLETED | OUTPATIENT
Start: 2023-08-16 | End: 2023-08-16

## 2023-08-16 RX ADMIN — ACETAMINOPHEN 1000 MG: 500 TABLET, FILM COATED ORAL at 16:53

## 2023-08-16 RX ADMIN — OXYCODONE HYDROCHLORIDE 5 MG: 5 TABLET ORAL at 19:37

## 2023-08-16 RX ADMIN — IBUPROFEN 600 MG: 600 TABLET ORAL at 16:54

## 2023-08-16 ASSESSMENT — ACTIVITIES OF DAILY LIVING (ADL): ADLS_ACUITY_SCORE: 35

## 2023-08-16 NOTE — LETTER
August 16, 2023      To Whom It May Concern:      Enoch Barrios was seen in our Emergency Department today, 08/16/23. She suffered an ankle fracture. She will need to be seen by orthopedic surgery for long-term work restrictions. Until orthopedic follow-up, I recommend work restrictions of restricted duty with no walking/standing on right leg. Knee scooter ok.     Sincerely,        Lauri Randhawa MD

## 2023-08-16 NOTE — ED TRIAGE NOTES
"Pt presents with right ankle pain after tripping over a rug at work and landing on her right side, happened 1.5 hours ago. Pt reports hearing a \"snap.\" HX of two previous right foot surgeries. Pt denies LOC, denies hitting her head. C/o 10/10 ankle pain. A&OX4.      Triage Assessment       Row Name 08/16/23 [id number]       Triage Assessment (Adult)    Airway WDL WDL       Respiratory WDL    Respiratory WDL WDL       Skin Circulation/Temperature WDL    Skin Circulation/Temperature WDL WDL       Cardiac WDL    Cardiac WDL WDL       Peripheral/Neurovascular WDL    Peripheral Neurovascular WDL WDL       Cognitive/Neuro/Behavioral WDL    Cognitive/Neuro/Behavioral WDL WDL                    "

## 2023-08-16 NOTE — ED PROVIDER NOTES
"  History     Chief Complaint:  Ankle Pain       The history is provided by the patient.      Enoch Barrios is a 42 year old female with history of hypertension and CKD who presents with ankle pain. She tripped over a rug at work and hurt her right ankle. She heard a \"crunch\" sound and reports pain on the front and outside of the right ankle. Denies pain anywhere else. She has a history of 2 surgeries in her right foot. Denies numbness/tingling.       Independent Historian:   None - Patient Only    Review of External Notes:   None      Medications:    Levothyroxine  Lopressor  Senna  Prilosec  Cozaar  Norvasc  Ditropanxl  Toprol    Past Medical History:    Pap smear  CKD  GERD  Hypertension  Hypothyroidism  Acute cystitis with hematuria  Ureterolithiasis  Anemia  Hypertriglyceridemia  Migraine  Nephrolithiasis      Past Surgical History:    Combined cystoscopy, retrogrades, insert stent ureter, right x2  D&C  Open reduction internal fixation foot, right       Physical Exam   Patient Vitals for the past 24 hrs:   BP Temp Temp src Pulse Resp SpO2   08/16/23 1644 (!) 163/105 98.4  F (36.9  C) Oral 72 16 100 %        Physical Exam    Head:  The scalp, face, and head appear normal  Eyes:  Conjunctivae are normal  ENT:    The nose is normal    Pinnae are normal  Neck:  Trachea midline  CV:  Normal rate, regular rhythm. DP pulses normal.   Resp:  No respiratory distress   Musc:  Normal muscular tone    Pain/swelling to right ankle. Pain to palpation over lateral malleolus. No pain over medial malleolus. No pain over proximal fibula or base of 5th metatarsal. She is able to DF/PF right ankle with minimal pain.   Skin:  No rash or lesions noted  Neuro: Speech is normal and fluent. Face is symmetric. Moving all extremities well.             Emergency Department Course     Imaging:  Ankle XR, G/E 3 views, right   Final Result   IMPRESSION: Acute oblique fracture of the distal fibular metaphysis, with mild, 3 mm displacement. " No additional fracture. Normal joint alignment. The ankle mortise is symmetric. Small ankle joint effusion. Moderate soft tissue swelling in the ankle. No    acute bone or joint abnormality otherwise. Prior operative fixation of the first, second, and third TMT joints, and the Lisfranc joint with screws and pins. No hardware loosening or complication.         Report per radiology    Emergency Department Course & Assessments:    Interventions:  Medications   oxyCODONE (ROXICODONE) tablet 5 mg (has no administration in time range)   ibuprofen (ADVIL/MOTRIN) tablet 600 mg (600 mg Oral $Given 8/16/23 6825)   acetaminophen (TYLENOL) tablet 1,000 mg (1,000 mg Oral $Given 8/16/23 4273)      Assessments:  1907 I examined the patient and obtained history as noted above.    Independent Interpretation (X-rays, CTs, rhythm strip):    I independently reviewed the XR. Pt has distal fibula fracture with no widening of the mortise.     Consultations/Discussion of Management or Tests:  None     Social Determinants of Health affecting care:   None    Disposition:  The patient was discharged to home.     Impression & Plan    Medical Decision Making:  Pt presents with right ankle pain after mechanical injury. No other pain/injuries. Imaging shows a distal fibula fracture. Fortunately, mortise appears intact. I suspect this will be a non-operative fracture based on this, though did caution pt that this can change. I suspect she'd do ok in a walking boot, but recommended she be non-weightbearing until orthopedics clears her. She is neurovascularly intact. She has a knee scooter at home. She declined crutches here. She is in stable condition at the time of discharge, indications for return to the ED were discussed as well as follow up. All questions were answered and she is in agreement with the plan.      Diagnosis:    ICD-10-CM    1. Closed fracture of distal end of right fibula, unspecified fracture morphology, initial encounter   S82.831A            Discharge Medications:  New Prescriptions    OXYCODONE (ROXICODONE) 5 MG TABLET    Take 1 tablet (5 mg) by mouth every 6 hours as needed for severe pain          Scribe Disclosure:  I, Pastor Simental, am serving as a scribe at 7:11 PM on 8/16/2023 to document services personally performed by Lauri Randhawa MD based on my observations and the provider's statements to me.          Lauri Randhawa MD  08/17/23 2033

## 2023-08-17 NOTE — DISCHARGE INSTRUCTIONS
You broke your fibula today.     The bones appear to be in place, however, suggesting that you may not need surgery.     Follow-up with orthopedic surgery in 1-2 weeks for recheck.    Even though we're placing you in a fracture boot, don't put any weight on your ankle until cleared by orthopedic surgery.     Return to emergency department for worsening/uncontrolled pain, numbness/tingling right foot, new pain, or for any other concerns.

## 2023-09-21 ENCOUNTER — DOCUMENTATION ONLY (OUTPATIENT)
Dept: HOME HEALTH SERVICES | Facility: CLINIC | Age: 43
End: 2023-09-21
Payer: COMMERCIAL

## 2023-09-21 DIAGNOSIS — S82.831A CLOSED FRACTURE OF DISTAL END OF RIGHT FIBULA, UNSPECIFIED FRACTURE MORPHOLOGY, INITIAL ENCOUNTER: Primary | ICD-10-CM

## 2025-03-21 ENCOUNTER — APPOINTMENT (OUTPATIENT)
Dept: GENERAL RADIOLOGY | Facility: CLINIC | Age: 45
DRG: 853 | End: 2025-03-21
Attending: UROLOGY
Payer: COMMERCIAL

## 2025-03-21 ENCOUNTER — HOSPITAL ENCOUNTER (INPATIENT)
Facility: CLINIC | Age: 45
LOS: 3 days | Discharge: HOME | DRG: 853 | End: 2025-03-24
Attending: EMERGENCY MEDICINE | Admitting: INTERNAL MEDICINE
Payer: COMMERCIAL

## 2025-03-21 ENCOUNTER — APPOINTMENT (OUTPATIENT)
Dept: CT IMAGING | Facility: CLINIC | Age: 45
DRG: 853 | End: 2025-03-21
Attending: EMERGENCY MEDICINE
Payer: COMMERCIAL

## 2025-03-21 ENCOUNTER — ANESTHESIA EVENT (OUTPATIENT)
Dept: SURGERY | Facility: CLINIC | Age: 45
DRG: 853 | End: 2025-03-21
Payer: COMMERCIAL

## 2025-03-21 ENCOUNTER — ANESTHESIA (OUTPATIENT)
Dept: SURGERY | Facility: CLINIC | Age: 45
DRG: 853 | End: 2025-03-21
Payer: COMMERCIAL

## 2025-03-21 DIAGNOSIS — R65.21 SEPTIC SHOCK DUE TO ESCHERICHIA COLI (H): ICD-10-CM

## 2025-03-21 DIAGNOSIS — N17.9 AKI (ACUTE KIDNEY INJURY): ICD-10-CM

## 2025-03-21 DIAGNOSIS — A41.51 SEPTIC SHOCK DUE TO ESCHERICHIA COLI (H): ICD-10-CM

## 2025-03-21 DIAGNOSIS — N30.01 ACUTE CYSTITIS WITH HEMATURIA: ICD-10-CM

## 2025-03-21 DIAGNOSIS — N20.0 KIDNEY STONE: ICD-10-CM

## 2025-03-21 DIAGNOSIS — B37.31 YEAST VAGINITIS: ICD-10-CM

## 2025-03-21 DIAGNOSIS — N12 PYELONEPHRITIS: ICD-10-CM

## 2025-03-21 DIAGNOSIS — N20.1 RIGHT URETERAL STONE: Primary | ICD-10-CM

## 2025-03-21 LAB
ALBUMIN SERPL BCG-MCNC: 4.1 G/DL (ref 3.5–5.2)
ALBUMIN UR-MCNC: 70 MG/DL
ALP SERPL-CCNC: 101 U/L (ref 40–150)
ALT SERPL W P-5'-P-CCNC: 17 U/L (ref 0–50)
ANION GAP SERPL CALCULATED.3IONS-SCNC: 12 MMOL/L (ref 7–15)
APPEARANCE UR: ABNORMAL
AST SERPL W P-5'-P-CCNC: 24 U/L (ref 0–45)
BACTERIA #/AREA URNS HPF: ABNORMAL /HPF
BASOPHILS # BLD AUTO: 0.1 10E3/UL (ref 0–0.2)
BASOPHILS NFR BLD AUTO: 1 %
BILIRUB SERPL-MCNC: 0.4 MG/DL
BILIRUB UR QL STRIP: NEGATIVE
BUN SERPL-MCNC: 11 MG/DL (ref 6–20)
CALCIUM SERPL-MCNC: 10.4 MG/DL (ref 8.8–10.4)
CHLORIDE SERPL-SCNC: 101 MMOL/L (ref 98–107)
COLOR UR AUTO: ABNORMAL
CREAT SERPL-MCNC: 1.4 MG/DL (ref 0.51–0.95)
D DIMER PPP FEU-MCNC: 0.34 UG/ML FEU (ref 0–0.5)
EGFRCR SERPLBLD CKD-EPI 2021: 47 ML/MIN/1.73M2
EOSINOPHIL # BLD AUTO: 0.7 10E3/UL (ref 0–0.7)
EOSINOPHIL NFR BLD AUTO: 5 %
ERYTHROCYTE [DISTWIDTH] IN BLOOD BY AUTOMATED COUNT: 14.4 % (ref 10–15)
GLUCOSE SERPL-MCNC: 135 MG/DL (ref 70–99)
GLUCOSE UR STRIP-MCNC: NEGATIVE MG/DL
HCG SERPL QL: NEGATIVE
HCG UR QL: NEGATIVE
HCO3 SERPL-SCNC: 23 MMOL/L (ref 22–29)
HCT VFR BLD AUTO: 36.4 % (ref 35–47)
HGB BLD-MCNC: 12.2 G/DL (ref 11.7–15.7)
HGB UR QL STRIP: ABNORMAL
IMM GRANULOCYTES # BLD: 0.2 10E3/UL
IMM GRANULOCYTES NFR BLD: 1 %
KETONES UR STRIP-MCNC: NEGATIVE MG/DL
LACTATE SERPL-SCNC: 4.5 MMOL/L (ref 0.7–2)
LACTATE SERPL-SCNC: 5.6 MMOL/L (ref 0.7–2)
LEUKOCYTE ESTERASE UR QL STRIP: ABNORMAL
LIPASE SERPL-CCNC: 43 U/L (ref 13–60)
LYMPHOCYTES # BLD AUTO: 2.3 10E3/UL (ref 0.8–5.3)
LYMPHOCYTES NFR BLD AUTO: 14 %
MCH RBC QN AUTO: 30.9 PG (ref 26.5–33)
MCHC RBC AUTO-ENTMCNC: 33.5 G/DL (ref 31.5–36.5)
MCV RBC AUTO: 92 FL (ref 78–100)
MONOCYTES # BLD AUTO: 0.3 10E3/UL (ref 0–1.3)
MONOCYTES NFR BLD AUTO: 2 %
MUCOUS THREADS #/AREA URNS LPF: PRESENT /LPF
NEUTROPHILS # BLD AUTO: 12.4 10E3/UL (ref 1.6–8.3)
NEUTROPHILS NFR BLD AUTO: 77 %
NITRATE UR QL: NEGATIVE
NRBC # BLD AUTO: 0 10E3/UL
NRBC BLD AUTO-RTO: 0 /100
PH UR STRIP: 6 [PH] (ref 5–7)
PLATELET # BLD AUTO: 203 10E3/UL (ref 150–450)
POTASSIUM SERPL-SCNC: 3.8 MMOL/L (ref 3.4–5.3)
PROT SERPL-MCNC: 7.1 G/DL (ref 6.4–8.3)
RBC # BLD AUTO: 3.95 10E6/UL (ref 3.8–5.2)
RBC URINE: 49 /HPF
SODIUM SERPL-SCNC: 136 MMOL/L (ref 135–145)
SP GR UR STRIP: 1.01 (ref 1–1.03)
SQUAMOUS EPITHELIAL: 20 /HPF
UROBILINOGEN UR STRIP-MCNC: NORMAL MG/DL
WBC # BLD AUTO: 16 10E3/UL (ref 4–11)
WBC CLUMPS #/AREA URNS HPF: PRESENT /HPF
WBC URINE: >182 /HPF

## 2025-03-21 PROCEDURE — 85379 FIBRIN DEGRADATION QUANT: CPT | Performed by: EMERGENCY MEDICINE

## 2025-03-21 PROCEDURE — 250N000011 HC RX IP 250 OP 636: Performed by: ANESTHESIOLOGY

## 2025-03-21 PROCEDURE — 83605 ASSAY OF LACTIC ACID: CPT | Performed by: EMERGENCY MEDICINE

## 2025-03-21 PROCEDURE — 99222 1ST HOSP IP/OBS MODERATE 55: CPT | Mod: 25 | Performed by: UROLOGY

## 2025-03-21 PROCEDURE — 36415 COLL VENOUS BLD VENIPUNCTURE: CPT | Performed by: EMERGENCY MEDICINE

## 2025-03-21 PROCEDURE — 250N000013 HC RX MED GY IP 250 OP 250 PS 637: Performed by: UROLOGY

## 2025-03-21 PROCEDURE — 81025 URINE PREGNANCY TEST: CPT | Performed by: EMERGENCY MEDICINE

## 2025-03-21 PROCEDURE — 96376 TX/PRO/DX INJ SAME DRUG ADON: CPT

## 2025-03-21 PROCEDURE — 272N000001 HC OR GENERAL SUPPLY STERILE: Performed by: UROLOGY

## 2025-03-21 PROCEDURE — 258N000003 HC RX IP 258 OP 636: Performed by: ANESTHESIOLOGY

## 2025-03-21 PROCEDURE — 250N000009 HC RX 250: Performed by: EMERGENCY MEDICINE

## 2025-03-21 PROCEDURE — C2617 STENT, NON-COR, TEM W/O DEL: HCPCS | Performed by: UROLOGY

## 2025-03-21 PROCEDURE — 96361 HYDRATE IV INFUSION ADD-ON: CPT

## 2025-03-21 PROCEDURE — 250N000011 HC RX IP 250 OP 636: Performed by: EMERGENCY MEDICINE

## 2025-03-21 PROCEDURE — 258N000003 HC RX IP 258 OP 636: Performed by: NURSE PRACTITIONER

## 2025-03-21 PROCEDURE — 99291 CRITICAL CARE FIRST HOUR: CPT | Mod: 25

## 2025-03-21 PROCEDURE — 255N000002 HC RX 255 OP 636: Performed by: UROLOGY

## 2025-03-21 PROCEDURE — 74420 UROGRAPHY RTRGR +-KUB: CPT | Mod: 26 | Performed by: UROLOGY

## 2025-03-21 PROCEDURE — 80053 COMPREHEN METABOLIC PANEL: CPT | Performed by: EMERGENCY MEDICINE

## 2025-03-21 PROCEDURE — 999N000179 XR SURGERY CARM FLUORO LESS THAN 5 MIN W STILLS

## 2025-03-21 PROCEDURE — 87086 URINE CULTURE/COLONY COUNT: CPT | Performed by: EMERGENCY MEDICINE

## 2025-03-21 PROCEDURE — 81001 URINALYSIS AUTO W/SCOPE: CPT | Performed by: EMERGENCY MEDICINE

## 2025-03-21 PROCEDURE — C1769 GUIDE WIRE: HCPCS | Performed by: UROLOGY

## 2025-03-21 PROCEDURE — 96375 TX/PRO/DX INJ NEW DRUG ADDON: CPT

## 2025-03-21 PROCEDURE — 360N000083 HC SURGERY LEVEL 3 W/ FLUORO, PER MIN: Performed by: UROLOGY

## 2025-03-21 PROCEDURE — 96374 THER/PROPH/DIAG INJ IV PUSH: CPT | Mod: 59

## 2025-03-21 PROCEDURE — 85025 COMPLETE CBC W/AUTO DIFF WBC: CPT | Performed by: EMERGENCY MEDICINE

## 2025-03-21 PROCEDURE — 710N000009 HC RECOVERY PHASE 1, LEVEL 1, PER MIN: Performed by: UROLOGY

## 2025-03-21 PROCEDURE — 370N000017 HC ANESTHESIA TECHNICAL FEE, PER MIN: Performed by: UROLOGY

## 2025-03-21 PROCEDURE — 87186 SC STD MICRODIL/AGAR DIL: CPT | Performed by: EMERGENCY MEDICINE

## 2025-03-21 PROCEDURE — 0T768DZ DILATION OF RIGHT URETER WITH INTRALUMINAL DEVICE, VIA NATURAL OR ARTIFICIAL OPENING ENDOSCOPIC: ICD-10-PCS | Performed by: UROLOGY

## 2025-03-21 PROCEDURE — 99222 1ST HOSP IP/OBS MODERATE 55: CPT | Performed by: NURSE PRACTITIONER

## 2025-03-21 PROCEDURE — 250N000009 HC RX 250: Performed by: ANESTHESIOLOGY

## 2025-03-21 PROCEDURE — 999N000141 HC STATISTIC PRE-PROCEDURE NURSING ASSESSMENT: Performed by: UROLOGY

## 2025-03-21 PROCEDURE — BT1D1ZZ FLUOROSCOPY OF RIGHT KIDNEY, URETER AND BLADDER USING LOW OSMOLAR CONTRAST: ICD-10-PCS | Performed by: UROLOGY

## 2025-03-21 PROCEDURE — 120N000013 HC R&B IMCU

## 2025-03-21 PROCEDURE — 87149 DNA/RNA DIRECT PROBE: CPT | Performed by: NURSE PRACTITIONER

## 2025-03-21 PROCEDURE — 84703 CHORIONIC GONADOTROPIN ASSAY: CPT | Performed by: EMERGENCY MEDICINE

## 2025-03-21 PROCEDURE — 74177 CT ABD & PELVIS W/CONTRAST: CPT

## 2025-03-21 PROCEDURE — 258N000003 HC RX IP 258 OP 636: Performed by: EMERGENCY MEDICINE

## 2025-03-21 PROCEDURE — C1758 CATHETER, URETERAL: HCPCS | Performed by: UROLOGY

## 2025-03-21 PROCEDURE — 52332 CYSTOSCOPY AND TREATMENT: CPT | Mod: RT | Performed by: UROLOGY

## 2025-03-21 PROCEDURE — 258N000001 HC RX 258: Performed by: UROLOGY

## 2025-03-21 PROCEDURE — 250N000009 HC RX 250: Performed by: UROLOGY

## 2025-03-21 PROCEDURE — 250N000025 HC SEVOFLURANE, PER MIN: Performed by: UROLOGY

## 2025-03-21 PROCEDURE — 250N000011 HC RX IP 250 OP 636: Performed by: NURSE PRACTITIONER

## 2025-03-21 PROCEDURE — 83690 ASSAY OF LIPASE: CPT | Performed by: EMERGENCY MEDICINE

## 2025-03-21 PROCEDURE — 87077 CULTURE AEROBIC IDENTIFY: CPT | Performed by: NURSE PRACTITIONER

## 2025-03-21 DEVICE — URETERAL STENT
Type: IMPLANTABLE DEVICE | Site: URETHRA | Status: FUNCTIONAL
Brand: POLARIS™ ULTRA

## 2025-03-21 RX ORDER — KETOROLAC TROMETHAMINE 15 MG/ML
15 INJECTION, SOLUTION INTRAMUSCULAR; INTRAVENOUS ONCE
Status: COMPLETED | OUTPATIENT
Start: 2025-03-21 | End: 2025-03-21

## 2025-03-21 RX ORDER — NICOTINE POLACRILEX 4 MG
15-30 LOZENGE BUCCAL
Status: DISCONTINUED | OUTPATIENT
Start: 2025-03-21 | End: 2025-03-22

## 2025-03-21 RX ORDER — PROPOFOL 10 MG/ML
INJECTION, EMULSION INTRAVENOUS PRN
Status: DISCONTINUED | OUTPATIENT
Start: 2025-03-21 | End: 2025-03-21

## 2025-03-21 RX ORDER — LIDOCAINE 40 MG/G
CREAM TOPICAL
Status: DISCONTINUED | OUTPATIENT
Start: 2025-03-21 | End: 2025-03-24 | Stop reason: HOSPADM

## 2025-03-21 RX ORDER — OXYBUTYNIN CHLORIDE 15 MG/1
1 TABLET, EXTENDED RELEASE ORAL DAILY
Status: ON HOLD | COMMUNITY
Start: 2023-08-21 | End: 2025-03-24

## 2025-03-21 RX ORDER — ALBUTEROL SULFATE 0.83 MG/ML
2.5 SOLUTION RESPIRATORY (INHALATION) EVERY 4 HOURS PRN
Status: DISCONTINUED | OUTPATIENT
Start: 2025-03-21 | End: 2025-03-21 | Stop reason: HOSPADM

## 2025-03-21 RX ORDER — HYDROMORPHONE HCL IN WATER/PF 6 MG/30 ML
0.4 PATIENT CONTROLLED ANALGESIA SYRINGE INTRAVENOUS
Status: DISCONTINUED | OUTPATIENT
Start: 2025-03-21 | End: 2025-03-24 | Stop reason: HOSPADM

## 2025-03-21 RX ORDER — AMOXICILLIN 250 MG
2 CAPSULE ORAL 2 TIMES DAILY PRN
Status: DISCONTINUED | OUTPATIENT
Start: 2025-03-21 | End: 2025-03-24 | Stop reason: HOSPADM

## 2025-03-21 RX ORDER — PIPERACILLIN SODIUM, TAZOBACTAM SODIUM 4; .5 G/20ML; G/20ML
4.5 INJECTION, POWDER, LYOPHILIZED, FOR SOLUTION INTRAVENOUS EVERY 6 HOURS
Status: DISCONTINUED | OUTPATIENT
Start: 2025-03-21 | End: 2025-03-24 | Stop reason: HOSPADM

## 2025-03-21 RX ORDER — CEFTRIAXONE 2 G/1
2 INJECTION, POWDER, FOR SOLUTION INTRAMUSCULAR; INTRAVENOUS EVERY 24 HOURS
Status: DISCONTINUED | OUTPATIENT
Start: 2025-03-22 | End: 2025-03-21

## 2025-03-21 RX ORDER — ACETAMINOPHEN 650 MG/1
650 SUPPOSITORY RECTAL EVERY 4 HOURS PRN
Status: DISCONTINUED | OUTPATIENT
Start: 2025-03-21 | End: 2025-03-24 | Stop reason: HOSPADM

## 2025-03-21 RX ORDER — IOPAMIDOL 755 MG/ML
500 INJECTION, SOLUTION INTRAVASCULAR ONCE
Status: COMPLETED | OUTPATIENT
Start: 2025-03-21 | End: 2025-03-21

## 2025-03-21 RX ORDER — NALOXONE HYDROCHLORIDE 0.4 MG/ML
0.1 INJECTION, SOLUTION INTRAMUSCULAR; INTRAVENOUS; SUBCUTANEOUS
Status: DISCONTINUED | OUTPATIENT
Start: 2025-03-21 | End: 2025-03-21 | Stop reason: HOSPADM

## 2025-03-21 RX ORDER — LEVOTHYROXINE SODIUM 125 UG/1
250 TABLET ORAL DAILY
Status: DISCONTINUED | OUTPATIENT
Start: 2025-03-22 | End: 2025-03-23

## 2025-03-21 RX ORDER — FENTANYL CITRATE 50 UG/ML
INJECTION, SOLUTION INTRAMUSCULAR; INTRAVENOUS PRN
Status: DISCONTINUED | OUTPATIENT
Start: 2025-03-21 | End: 2025-03-21

## 2025-03-21 RX ORDER — ONDANSETRON 2 MG/ML
4 INJECTION INTRAMUSCULAR; INTRAVENOUS EVERY 6 HOURS PRN
Status: DISCONTINUED | OUTPATIENT
Start: 2025-03-21 | End: 2025-03-24 | Stop reason: HOSPADM

## 2025-03-21 RX ORDER — LABETALOL HYDROCHLORIDE 5 MG/ML
10 INJECTION, SOLUTION INTRAVENOUS
Status: DISCONTINUED | OUTPATIENT
Start: 2025-03-21 | End: 2025-03-21 | Stop reason: HOSPADM

## 2025-03-21 RX ORDER — ONDANSETRON 2 MG/ML
4 INJECTION INTRAMUSCULAR; INTRAVENOUS EVERY 30 MIN PRN
Status: DISCONTINUED | OUTPATIENT
Start: 2025-03-21 | End: 2025-03-21

## 2025-03-21 RX ORDER — NALOXONE HYDROCHLORIDE 0.4 MG/ML
0.2 INJECTION, SOLUTION INTRAMUSCULAR; INTRAVENOUS; SUBCUTANEOUS
Status: DISCONTINUED | OUTPATIENT
Start: 2025-03-21 | End: 2025-03-24 | Stop reason: HOSPADM

## 2025-03-21 RX ORDER — NALOXONE HYDROCHLORIDE 0.4 MG/ML
0.4 INJECTION, SOLUTION INTRAMUSCULAR; INTRAVENOUS; SUBCUTANEOUS
Status: DISCONTINUED | OUTPATIENT
Start: 2025-03-21 | End: 2025-03-24 | Stop reason: HOSPADM

## 2025-03-21 RX ORDER — ONDANSETRON 4 MG/1
4 TABLET, ORALLY DISINTEGRATING ORAL EVERY 6 HOURS PRN
Status: DISCONTINUED | OUTPATIENT
Start: 2025-03-21 | End: 2025-03-24 | Stop reason: HOSPADM

## 2025-03-21 RX ORDER — ONDANSETRON 2 MG/ML
INJECTION INTRAMUSCULAR; INTRAVENOUS PRN
Status: DISCONTINUED | OUTPATIENT
Start: 2025-03-21 | End: 2025-03-21

## 2025-03-21 RX ORDER — AMOXICILLIN 250 MG
1 CAPSULE ORAL 2 TIMES DAILY PRN
Status: DISCONTINUED | OUTPATIENT
Start: 2025-03-21 | End: 2025-03-24 | Stop reason: HOSPADM

## 2025-03-21 RX ORDER — PANTOPRAZOLE SODIUM 40 MG/1
40 TABLET, DELAYED RELEASE ORAL
Status: DISCONTINUED | OUTPATIENT
Start: 2025-03-22 | End: 2025-03-24 | Stop reason: HOSPADM

## 2025-03-21 RX ORDER — CEFAZOLIN SODIUM/WATER 2 G/20 ML
2 SYRINGE (ML) INTRAVENOUS SEE ADMIN INSTRUCTIONS
Status: DISCONTINUED | OUTPATIENT
Start: 2025-03-21 | End: 2025-03-21

## 2025-03-21 RX ORDER — FENTANYL CITRATE 50 UG/ML
50 INJECTION, SOLUTION INTRAMUSCULAR; INTRAVENOUS EVERY 5 MIN PRN
Status: DISCONTINUED | OUTPATIENT
Start: 2025-03-21 | End: 2025-03-21 | Stop reason: HOSPADM

## 2025-03-21 RX ORDER — ACETAMINOPHEN 650 MG/1
650 SUPPOSITORY RECTAL ONCE
Status: COMPLETED | OUTPATIENT
Start: 2025-03-21 | End: 2025-03-21

## 2025-03-21 RX ORDER — SODIUM CHLORIDE, SODIUM LACTATE, POTASSIUM CHLORIDE, CALCIUM CHLORIDE 600; 310; 30; 20 MG/100ML; MG/100ML; MG/100ML; MG/100ML
INJECTION, SOLUTION INTRAVENOUS CONTINUOUS
Status: DISCONTINUED | OUTPATIENT
Start: 2025-03-21 | End: 2025-03-21

## 2025-03-21 RX ORDER — DEXTROSE MONOHYDRATE 25 G/50ML
25-50 INJECTION, SOLUTION INTRAVENOUS
Status: DISCONTINUED | OUTPATIENT
Start: 2025-03-21 | End: 2025-03-22

## 2025-03-21 RX ORDER — DEXAMETHASONE SODIUM PHOSPHATE 4 MG/ML
INJECTION, SOLUTION INTRA-ARTICULAR; INTRALESIONAL; INTRAMUSCULAR; INTRAVENOUS; SOFT TISSUE PRN
Status: DISCONTINUED | OUTPATIENT
Start: 2025-03-21 | End: 2025-03-21

## 2025-03-21 RX ORDER — ACETAMINOPHEN 325 MG/1
650 TABLET ORAL EVERY 4 HOURS PRN
Status: DISCONTINUED | OUTPATIENT
Start: 2025-03-21 | End: 2025-03-24 | Stop reason: HOSPADM

## 2025-03-21 RX ORDER — METOPROLOL TARTRATE 50 MG
50 TABLET ORAL 2 TIMES DAILY
Status: DISCONTINUED | OUTPATIENT
Start: 2025-03-21 | End: 2025-03-22

## 2025-03-21 RX ORDER — AMLODIPINE BESYLATE 5 MG/1
5 TABLET ORAL DAILY
COMMUNITY
Start: 2023-08-21

## 2025-03-21 RX ORDER — LOSARTAN POTASSIUM 100 MG/1
100 TABLET ORAL DAILY
Status: ON HOLD | COMMUNITY
Start: 2023-08-21 | End: 2025-03-24

## 2025-03-21 RX ORDER — SODIUM CHLORIDE, SODIUM LACTATE, POTASSIUM CHLORIDE, CALCIUM CHLORIDE 600; 310; 30; 20 MG/100ML; MG/100ML; MG/100ML; MG/100ML
INJECTION, SOLUTION INTRAVENOUS CONTINUOUS
Status: DISCONTINUED | OUTPATIENT
Start: 2025-03-21 | End: 2025-03-21 | Stop reason: HOSPADM

## 2025-03-21 RX ORDER — HYDROMORPHONE HCL IN WATER/PF 6 MG/30 ML
0.2 PATIENT CONTROLLED ANALGESIA SYRINGE INTRAVENOUS EVERY 5 MIN PRN
Status: DISCONTINUED | OUTPATIENT
Start: 2025-03-21 | End: 2025-03-21 | Stop reason: HOSPADM

## 2025-03-21 RX ORDER — ONDANSETRON 4 MG/1
4 TABLET, ORALLY DISINTEGRATING ORAL EVERY 30 MIN PRN
Status: DISCONTINUED | OUTPATIENT
Start: 2025-03-21 | End: 2025-03-21 | Stop reason: HOSPADM

## 2025-03-21 RX ORDER — HYDRALAZINE HYDROCHLORIDE 20 MG/ML
2.5-5 INJECTION INTRAMUSCULAR; INTRAVENOUS EVERY 10 MIN PRN
Status: DISCONTINUED | OUTPATIENT
Start: 2025-03-21 | End: 2025-03-21 | Stop reason: HOSPADM

## 2025-03-21 RX ORDER — DEXAMETHASONE SODIUM PHOSPHATE 4 MG/ML
4 INJECTION, SOLUTION INTRA-ARTICULAR; INTRALESIONAL; INTRAMUSCULAR; INTRAVENOUS; SOFT TISSUE
Status: DISCONTINUED | OUTPATIENT
Start: 2025-03-21 | End: 2025-03-21 | Stop reason: HOSPADM

## 2025-03-21 RX ORDER — SENNOSIDES 8.6 MG
650 CAPSULE ORAL DAILY PRN
COMMUNITY

## 2025-03-21 RX ORDER — HYDROMORPHONE HCL IN WATER/PF 6 MG/30 ML
0.4 PATIENT CONTROLLED ANALGESIA SYRINGE INTRAVENOUS EVERY 5 MIN PRN
Status: DISCONTINUED | OUTPATIENT
Start: 2025-03-21 | End: 2025-03-21 | Stop reason: HOSPADM

## 2025-03-21 RX ORDER — CEFTRIAXONE 1 G/1
1 INJECTION, POWDER, FOR SOLUTION INTRAMUSCULAR; INTRAVENOUS ONCE
Status: COMPLETED | OUTPATIENT
Start: 2025-03-21 | End: 2025-03-21

## 2025-03-21 RX ORDER — LIDOCAINE 40 MG/G
CREAM TOPICAL
Status: DISCONTINUED | OUTPATIENT
Start: 2025-03-21 | End: 2025-03-22

## 2025-03-21 RX ORDER — MORPHINE SULFATE 4 MG/ML
4 INJECTION, SOLUTION INTRAMUSCULAR; INTRAVENOUS
Status: DISCONTINUED | OUTPATIENT
Start: 2025-03-21 | End: 2025-03-21

## 2025-03-21 RX ORDER — CALCIUM CARBONATE 500 MG/1
1000 TABLET, CHEWABLE ORAL 4 TIMES DAILY PRN
Status: DISCONTINUED | OUTPATIENT
Start: 2025-03-21 | End: 2025-03-24 | Stop reason: HOSPADM

## 2025-03-21 RX ORDER — SODIUM CHLORIDE, SODIUM LACTATE, POTASSIUM CHLORIDE, CALCIUM CHLORIDE 600; 310; 30; 20 MG/100ML; MG/100ML; MG/100ML; MG/100ML
INJECTION, SOLUTION INTRAVENOUS CONTINUOUS
Status: DISCONTINUED | OUTPATIENT
Start: 2025-03-21 | End: 2025-03-22

## 2025-03-21 RX ORDER — METOPROLOL SUCCINATE 100 MG/1
100 TABLET, EXTENDED RELEASE ORAL DAILY
COMMUNITY

## 2025-03-21 RX ORDER — FENTANYL CITRATE 50 UG/ML
25 INJECTION, SOLUTION INTRAMUSCULAR; INTRAVENOUS EVERY 5 MIN PRN
Status: DISCONTINUED | OUTPATIENT
Start: 2025-03-21 | End: 2025-03-21 | Stop reason: HOSPADM

## 2025-03-21 RX ORDER — HYDROMORPHONE HCL IN WATER/PF 6 MG/30 ML
0.2 PATIENT CONTROLLED ANALGESIA SYRINGE INTRAVENOUS
Status: DISCONTINUED | OUTPATIENT
Start: 2025-03-21 | End: 2025-03-24 | Stop reason: HOSPADM

## 2025-03-21 RX ORDER — ACETAMINOPHEN 325 MG/1
975 TABLET ORAL ONCE
Status: COMPLETED | OUTPATIENT
Start: 2025-03-21 | End: 2025-03-21

## 2025-03-21 RX ORDER — CEFAZOLIN SODIUM/WATER 2 G/20 ML
2 SYRINGE (ML) INTRAVENOUS
Status: DISCONTINUED | OUTPATIENT
Start: 2025-03-21 | End: 2025-03-21

## 2025-03-21 RX ORDER — LIDOCAINE HYDROCHLORIDE 20 MG/ML
INJECTION, SOLUTION INFILTRATION; PERINEURAL PRN
Status: DISCONTINUED | OUTPATIENT
Start: 2025-03-21 | End: 2025-03-21

## 2025-03-21 RX ORDER — SODIUM CHLORIDE, SODIUM LACTATE, POTASSIUM CHLORIDE, CALCIUM CHLORIDE 600; 310; 30; 20 MG/100ML; MG/100ML; MG/100ML; MG/100ML
INJECTION, SOLUTION INTRAVENOUS CONTINUOUS PRN
Status: DISCONTINUED | OUTPATIENT
Start: 2025-03-21 | End: 2025-03-21

## 2025-03-21 RX ORDER — ONDANSETRON 2 MG/ML
4 INJECTION INTRAMUSCULAR; INTRAVENOUS EVERY 30 MIN PRN
Status: DISCONTINUED | OUTPATIENT
Start: 2025-03-21 | End: 2025-03-21 | Stop reason: HOSPADM

## 2025-03-21 RX ADMIN — KETOROLAC TROMETHAMINE 15 MG: 15 INJECTION, SOLUTION INTRAMUSCULAR; INTRAVENOUS at 18:21

## 2025-03-21 RX ADMIN — ONDANSETRON 4 MG: 2 INJECTION INTRAMUSCULAR; INTRAVENOUS at 16:39

## 2025-03-21 RX ADMIN — IOPAMIDOL 100 ML: 755 INJECTION, SOLUTION INTRAVENOUS at 17:24

## 2025-03-21 RX ADMIN — SODIUM CHLORIDE 1000 ML: 9 INJECTION, SOLUTION INTRAVENOUS at 21:46

## 2025-03-21 RX ADMIN — ACETAMINOPHEN 975 MG: 325 TABLET, FILM COATED ORAL at 20:27

## 2025-03-21 RX ADMIN — LIDOCAINE HYDROCHLORIDE 50 MG: 20 INJECTION, SOLUTION INFILTRATION; PERINEURAL at 20:45

## 2025-03-21 RX ADMIN — MIDAZOLAM 2 MG: 1 INJECTION INTRAMUSCULAR; INTRAVENOUS at 20:40

## 2025-03-21 RX ADMIN — SODIUM CHLORIDE 1000 ML: 9 INJECTION, SOLUTION INTRAVENOUS at 18:45

## 2025-03-21 RX ADMIN — DEXAMETHASONE SODIUM PHOSPHATE 8 MG: 4 INJECTION, SOLUTION INTRA-ARTICULAR; INTRALESIONAL; INTRAMUSCULAR; INTRAVENOUS; SOFT TISSUE at 20:45

## 2025-03-21 RX ADMIN — SODIUM CHLORIDE 1000 ML: 9 INJECTION, SOLUTION INTRAVENOUS at 16:38

## 2025-03-21 RX ADMIN — CEFTRIAXONE 1 G: 1 INJECTION, POWDER, FOR SOLUTION INTRAMUSCULAR; INTRAVENOUS at 19:43

## 2025-03-21 RX ADMIN — PHENYLEPHRINE HYDROCHLORIDE 100 MCG: 10 INJECTION INTRAVENOUS at 20:58

## 2025-03-21 RX ADMIN — PHENYLEPHRINE HYDROCHLORIDE 100 MCG: 10 INJECTION INTRAVENOUS at 20:49

## 2025-03-21 RX ADMIN — SODIUM CHLORIDE, SODIUM LACTATE, POTASSIUM CHLORIDE, AND CALCIUM CHLORIDE: .6; .31; .03; .02 INJECTION, SOLUTION INTRAVENOUS at 20:40

## 2025-03-21 RX ADMIN — ONDANSETRON 4 MG: 2 INJECTION INTRAMUSCULAR; INTRAVENOUS at 21:02

## 2025-03-21 RX ADMIN — MORPHINE SULFATE 4 MG: 4 INJECTION, SOLUTION INTRAMUSCULAR; INTRAVENOUS at 16:39

## 2025-03-21 RX ADMIN — PIPERACILLIN AND TAZOBACTAM 4.5 G: 4; .5 INJECTION, POWDER, FOR SOLUTION INTRAVENOUS at 22:47

## 2025-03-21 RX ADMIN — FENTANYL CITRATE 100 MCG: 50 INJECTION INTRAMUSCULAR; INTRAVENOUS at 20:43

## 2025-03-21 RX ADMIN — SODIUM CHLORIDE, SODIUM LACTATE, POTASSIUM CHLORIDE, AND CALCIUM CHLORIDE: .6; .31; .03; .02 INJECTION, SOLUTION INTRAVENOUS at 23:44

## 2025-03-21 RX ADMIN — PROPOFOL 200 MG: 10 INJECTION, EMULSION INTRAVENOUS at 20:45

## 2025-03-21 RX ADMIN — MORPHINE SULFATE 4 MG: 4 INJECTION, SOLUTION INTRAMUSCULAR; INTRAVENOUS at 18:45

## 2025-03-21 RX ADMIN — Medication 100 MG: at 20:45

## 2025-03-21 RX ADMIN — SODIUM CHLORIDE 65 ML: 9 INJECTION, SOLUTION INTRAVENOUS at 17:24

## 2025-03-21 ASSESSMENT — ACTIVITIES OF DAILY LIVING (ADL)
ADLS_ACUITY_SCORE: 48

## 2025-03-21 ASSESSMENT — COLUMBIA-SUICIDE SEVERITY RATING SCALE - C-SSRS
6. HAVE YOU EVER DONE ANYTHING, STARTED TO DO ANYTHING, OR PREPARED TO DO ANYTHING TO END YOUR LIFE?: NO
1. IN THE PAST MONTH, HAVE YOU WISHED YOU WERE DEAD OR WISHED YOU COULD GO TO SLEEP AND NOT WAKE UP?: NO
2. HAVE YOU ACTUALLY HAD ANY THOUGHTS OF KILLING YOURSELF IN THE PAST MONTH?: NO

## 2025-03-21 NOTE — ED PROVIDER NOTES
"  Emergency Department Note      History of Present Illness     Chief Complaint   Abdominal Pain    HPI   Enoch Barrios is a 44 year old female with RLQ that started three hours ago (1230) and radiates to the back. The pain is a constant, severe aching, and has worsened since onset, noting she is unable to get comfortable. When her symptoms randomly started, she was laying down watching TV. She endorses pain with breathing, but denies difficulty urinating, fever, cough, nausea or chest pain. Of note, the patient has a history of kidney stone, but is unable to recall if her symptoms are similar.     Independent Historian   None    Review of External Notes   I reviewed the  note from 1/28/25.     Past Medical History     Medical History and Problem List   Chronic kidney disease  Gastroesophageal reflux disease  Hypertension  Hypothyroidism   Prediabetes   Pyelonephritis   Thyroid disease    Medications   Amlodipine   Levothyroxine   Metoprolol   Omeprazole     Surgical History   Cystoscopy   D&C  ORIF, right foot    Physical Exam     Patient Vitals for the past 24 hrs:   BP Temp Temp src Pulse Resp SpO2 Height   03/21/25 2012 -- 98.4  F (36.9  C) -- -- -- 95 % --   03/21/25 2010 (!) 184/126 -- -- 114 -- -- --   03/21/25 1833 -- -- -- -- -- 98 % --   03/21/25 1818 (!) 182/177 -- -- -- -- -- --   03/21/25 1638 -- -- -- -- -- 99 % --   03/21/25 1635 -- -- -- -- -- 100 % --   03/21/25 1632 (!) 167/135 -- -- 88 -- 98 % --   03/21/25 1530 (!) 177/121 97.9  F (36.6  C) Oral 79 22 99 % --   03/21/25 1527 -- -- -- -- -- -- 1.676 m (5' 6\")     Physical Exam  General: The patient is alert, in no respiratory distress. Uncomfortable appearing.     HENT: Mucous membranes moist.    Cardiovascular: Regular rate and rhythm. Good pulses in all four extremities. Normal capillary refill and skin turgor.     Respiratory: Lungs are clear. No nasal flaring. No retractions. No wheezing, no crackles.    Gastrointestinal: Abdomen soft. No " guarding, no rebound. No palpable hernias. No abdominal or flank tenderness.     Musculoskeletal: No gross deformity.     Skin: No rashes or petechiae.     Neurologic: The patient is alert and oriented x3. GCS 15. No testable cranial nerve deficit. Follows commands with clear and appropriate speech. Gives appropriate answers. Good strength in all extremities. No gross neurologic deficit. Gross sensation intact. Pupils are round and reactive. No meningismus.     Lymphatic: No cervical adenopathy. No lower extremity swelling.    Psychiatric: The patient is non-tearful.    Diagnostics     Lab Results   Labs Ordered and Resulted from Time of ED Arrival to Time of ED Departure   COMPREHENSIVE METABOLIC PANEL - Abnormal       Result Value    Sodium 136      Potassium 3.8      Carbon Dioxide (CO2) 23      Anion Gap 12      Urea Nitrogen 11.0      Creatinine 1.40 (*)     GFR Estimate 47 (*)     Calcium 10.4      Chloride 101      Glucose 135 (*)     Alkaline Phosphatase 101      AST 24      ALT 17      Protein Total 7.1      Albumin 4.1      Bilirubin Total 0.4     ROUTINE UA WITH MICROSCOPIC - Abnormal    Color Urine Orange (*)     Appearance Urine Slightly Cloudy (*)     Glucose Urine Negative      Bilirubin Urine Negative      Ketones Urine Negative      Specific Gravity Urine 1.013      Blood Urine Small (*)     pH Urine 6.0      Protein Albumin Urine 70 (*)     Urobilinogen Urine Normal      Nitrite Urine Negative      Leukocyte Esterase Urine Large (*)     Bacteria Urine Few (*)     WBC Clumps Urine Present (*)     Mucus Urine Present (*)     RBC Urine 49 (*)     WBC Urine >182 (*)     Squamous Epithelials Urine 20 (*)    CBC WITH PLATELETS AND DIFFERENTIAL - Abnormal    WBC Count 16.0 (*)     RBC Count 3.95      Hemoglobin 12.2      Hematocrit 36.4      MCV 92      MCH 30.9      MCHC 33.5      RDW 14.4      Platelet Count 203      % Neutrophils 77      % Lymphocytes 14      % Monocytes 2      % Eosinophils 5      %  Basophils 1      % Immature Granulocytes 1      NRBCs per 100 WBC 0      Absolute Neutrophils 12.4 (*)     Absolute Lymphocytes 2.3      Absolute Monocytes 0.3      Absolute Eosinophils 0.7      Absolute Basophils 0.1      Absolute Immature Granulocytes 0.2      Absolute NRBCs 0.0     D DIMER QUANTITATIVE - Normal    D-Dimer Quantitative 0.34     LIPASE - Normal    Lipase 43     HCG QUALITATIVE PREGNANCY - Normal    hCG Serum Qualitative Negative     HCG QUALITATIVE URINE - Normal    hCG Urine Qualitative Negative     URINE CULTURE   BLOOD CULTURE   BLOOD CULTURE       Imaging   CT Abdomen Pelvis w Contrast   Final Result   IMPRESSION:    1.  Likely chronically obstructing 6 mm in the distal right ureter with persistent moderate hydroureteronephrosis and perinephric/periureteric stranding. If this is the same stone that was seen on prior CT, it is slightly progressed since 2020.    Significant cortical thinning in the right kidney also suggests chronic nature of this obstruction.   2.  While degree of perinephric/periureteric stranding is similar to prior CT, recommend correlation with urinalysis to exclude superimposed urinary tract infection.   3.  Diffuse hepatic steatosis.         XR Surgery PINKY L/T 5 Min Fluoro w Stills    (Results Pending)         ED Course      Medications Administered   Medications   morphine (PF) injection 4 mg ( Intravenous Auto Hold 3/21/25 1946)   ondansetron (ZOFRAN) injection 4 mg ( Intravenous Auto Hold 3/21/25 1946)   lidocaine 1 % 0.1-1 mL (has no administration in time range)   lidocaine (LMX4) cream (has no administration in time range)   sodium chloride (PF) 0.9% PF flush 3 mL (has no administration in time range)   sodium chloride (PF) 0.9% PF flush 3 mL (has no administration in time range)   lactated ringers infusion (has no administration in time range)   sodium chloride 0.9% BOLUS 1,000 mL (1,000 mLs Intravenous $New Bag 3/21/25 1639)   CT Scan Flush (65 mLs Intravenous  $Given 3/21/25 1724)   iopamidol (ISOVUE-370) solution 500 mL (100 mLs Intravenous $Given 3/21/25 1724)   cefTRIAXone (ROCEPHIN) 1 g vial to attach to  mL bag for ADULTS or NS 50 mL bag for PEDS (1 g Intravenous $New Bag 3/21/25 1943)   ketorolac (TORADOL) injection 15 mg (15 mg Intravenous $Given 3/21/25 1821)   sodium chloride 0.9% BOLUS 1,000 mL (1,000 mLs Intravenous $New Bag 3/21/25 1845)   acetaminophen (TYLENOL) tablet 975 mg (975 mg Oral $Given 3/21/25 2027)     Or   acetaminophen (TYLENOL) Suppository 650 mg ( Rectal See Alternative 3/21/25 2027)     Procedures   Procedures     Discussion of Management   I discussed the case with Dr. Zacarias of urology who feels the patient requires a stent  1850 I discussed the case with Javier KELLY for the hospitalist service who accepts the patient for admission    ED Course   ED Course as of 03/21/25 2038   Fri Mar 21, 2025   1533 I obtained the history and examined the patient as noted above.        1745 I reassessed the patient she said she is still having pain we discussed results I will order Toradol for her      Medical Decision Making / Diagnosis         KAYLIN Barrios is a 44 year old female who presents with quite significant pain on her right flank and right lower quadrant.  I was very concerned this could be appendicitis diverticulitis or obstruction ovarian torsion was considered however with the patient with history of kidney stone infected stone is possible.  The patient in fact has a known stone on the right side for my review of her previous chart she in fact does show signs of a UTI cultures were ordered antibiotics was given I discussed the case with urology who did feel the patient required a stent she required aggressive treatment of her pain I did give her dose of Toradol but consider that with the poor kidney function this could worsen her.  The patient was admitted to hospital for treatment of her stone.    Disposition   The patient was  admitted to the hospital.     Critical care time is 35 minutes and exclusive procedures.    Diagnosis     ICD-10-CM    1. Right ureteral stone  N20.1 Case Request: CYSTOSCOPY, WITH URETERAL STENT INSERTION     Case Request: CYSTOSCOPY, WITH URETERAL STENT INSERTION           Discharge Medications   Current Discharge Medication List        Scribe Disclosure:  Carole MITTAL, am serving as a scribe at 3:31 PM on 3/21/2025 to document services personally performed by Pablo Escalona MD based on my observations and the provider's statements to me.        Pablo Escalona MD  03/21/25 2039

## 2025-03-21 NOTE — LETTER
[facility]  [address] [place] [address]  Phone: [phone]  Fax: [phone]    March 23, 2025        Enoch Barrios  [address]  [facility] [address]          To whom it may concern:    RE: Enoch DICKSON Jesusitavaughn Barrios was admitted to this hospital on 3/21/2025 and remains in the hospital at this time. She is not expected to be comfortable to return to work this coming week. She may need more time in fact, but you will be informed of those plans as they become clear.     Please contact me for questions or concerns.      Sincerely,          Yovani Hand MD  Hospitalist Service

## 2025-03-21 NOTE — ED TRIAGE NOTES
Pt arrives from home via EMS. Endorsing RLQ pain that started around noon today & radiates to R flank and back. Hx of kidney stones. BP elevated, did not take meds today. No recent illness. No emesis, diarrhea. A&Ox4. 95% on RA.      Triage Assessment (Adult)       Row Name 03/21/25 1528          Triage Assessment    Airway WDL WDL        Respiratory WDL    Respiratory WDL WDL        Skin Circulation/Temperature WDL    Skin Circulation/Temperature WDL WDL        Cardiac WDL    Cardiac WDL WDL        Peripheral/Neurovascular WDL    Peripheral Neurovascular WDL WDL        Cognitive/Neuro/Behavioral WDL    Cognitive/Neuro/Behavioral WDL WDL

## 2025-03-21 NOTE — ANESTHESIA PREPROCEDURE EVALUATION
Anesthesia Pre-Procedure Evaluation    Patient: Enoch Barrios   MRN: [id number] : 1980        Procedure : Procedure(s):  CYSTOSCOPY, WITH URETERAL STENT INSERTION          Past Medical History:   Diagnosis Date    Abnormal Pap smear     colposcopy    Chronic kidney disease     Gastroesophageal reflux disease     Hypertension     Thyroid disease     on thyroid meds/hypothyroid      Past Surgical History:   Procedure Laterality Date    COMBINED CYSTOSCOPY, INSERT STENT URETER(S) Right 3/24/2020    Procedure: Video cystoscopy, right double-J stent placement (5-Polish x24 cm).;  Surgeon: Joseph Dangelo MD;  Location: [facility] OR    CYSTOSCOPY, RETROGRADES, INSERT STENT URETER(S), COMBINED Right 9/3/2015    Procedure: COMBINED CYSTOSCOPY, RETROGRADES, INSERT STENT URETER(S);  Surgeon: Joseph Dangelo MD;  Location: [facility] OR    DILATION AND CURETTAGE SUCTION N/A 2016    Procedure: DILATION AND CURETTAGE SUCTION;  Surgeon: Albert Hammer MD;  Location: [facility] OR    OPEN REDUCTION INTERNAL FIXATION FOOT Right 2019    Procedure: Open reduction internal fixation right LisFranc fracture, open reduction internal fixation right third metatarsal base fracture;  Surgeon: Monroe Skelton MD;  Location: [facility] OR      No Known Allergies   Social History     Tobacco Use    Smoking status: Never    Smokeless tobacco: Never   Substance Use Topics    Alcohol use: No      Wt Readings from Last 1 Encounters:   20 108.9 kg (240 lb)        Anesthesia Evaluation            ROS/MED HX  ENT/Pulmonary:  - neg pulmonary ROS     Neurologic:       Cardiovascular:     (+)  hypertension- -   -  - -                                      METS/Exercise Tolerance:     Hematologic:       Musculoskeletal:       GI/Hepatic:     (+) GERD (severe GERD- cannot sleep flat),                   Renal/Genitourinary:     (+) renal disease, type: CRI and ARF,     Nephrolithiasis ,       Endo:     (+)          thyroid problem,  hypothyroidism,    Obesity,       Psychiatric/Substance Use:       Infectious Disease: Comment: Severe sepsis: elevated lactate, tachycardia, elevated WBC      Malignancy:       Other:            Physical Exam    Airway        Mallampati: II   TM distance: > 3 FB   Neck ROM: full   Mouth opening: > 3 cm    Respiratory Devices and Support         Dental           Cardiovascular   cardiovascular exam normal          Pulmonary   pulmonary exam normal                OUTSIDE LABS:  CBC:   Lab Results   Component Value Date    WBC 16.0 (H) 03/21/2025    WBC 11.2 (H) 03/25/2020    HGB 12.2 03/21/2025    HGB 10.2 (L) 03/25/2020    HCT 36.4 03/21/2025    HCT 32.3 (L) 03/25/2020     03/21/2025     03/25/2020     BMP:   Lab Results   Component Value Date     03/21/2025     03/26/2020    POTASSIUM 3.8 03/21/2025    POTASSIUM 4.0 03/26/2020    CHLORIDE 101 03/21/2025    CHLORIDE 107 03/26/2020    CO2 23 03/21/2025    CO2 26 03/26/2020    BUN 11.0 03/21/2025    BUN 18 03/26/2020    CR 1.40 (H) 03/21/2025    CR 1.49 (H) 03/26/2020     (H) 03/21/2025     (H) 03/26/2020     COAGS:   Lab Results   Component Value Date    INR 0.92 05/07/2016     POC:   Lab Results   Component Value Date    HCG Negative 03/21/2025    HCGS Negative 03/21/2025     HEPATIC:   Lab Results   Component Value Date    ALBUMIN 4.1 03/21/2025    PROTTOTAL 7.1 03/21/2025    ALT 17 03/21/2025    AST 24 03/21/2025    ALKPHOS 101 03/21/2025    BILITOTAL 0.4 03/21/2025    BILIDIRECT 0.1 04/23/2012     OTHER:   Lab Results   Component Value Date    LACT 2.0 03/24/2020    YENY 10.4 03/21/2025    MAG 1.6 09/03/2015    LIPASE 43 03/21/2025    T4 0.5 (L) 08/17/2012       Anesthesia Plan    ASA Status:  3, emergent    NPO Status:  ELEVATED Aspiration Risk/Unknown    Anesthesia Type: General.     - Airway: ETT   Induction: Intravenous, RSI.   Maintenance: Balanced.        Consents    Anesthesia Plan(s) and associated risks, benefits,  and realistic alternatives discussed. Questions answered and patient/representative(s) expressed understanding.     - Discussed:     - Discussed with:  Patient            Postoperative Care    Pain management: IV analgesics, Oral pain medications, Multi-modal analgesia.   PONV prophylaxis: Ondansetron (or other 5HT-3), Dexamethasone or Solumedrol     Comments:    Other Comments: Patient meets severe sepsis criteria- will administer fluid bolus  Discussed with patient the possibility of clinical deterioration requiring invasive line placement and ICU admission           Chery Estrada MD    Clinically Significant Risk Factors Present on Admission

## 2025-03-22 DIAGNOSIS — N20.1 URETERAL STONE: Primary | ICD-10-CM

## 2025-03-22 LAB
ACINETOBACTER SPECIES: NOT DETECTED
ANION GAP SERPL CALCULATED.3IONS-SCNC: 10 MMOL/L (ref 7–15)
BACTERIA UR CULT: ABNORMAL
BUN SERPL-MCNC: 16.6 MG/DL (ref 6–20)
CALCIUM SERPL-MCNC: 8.8 MG/DL (ref 8.8–10.4)
CHLORIDE SERPL-SCNC: 106 MMOL/L (ref 98–107)
CITROBACTER SPECIES: NOT DETECTED
CREAT SERPL-MCNC: 2.02 MG/DL (ref 0.51–0.95)
CTX-M: NOT DETECTED
EGFRCR SERPLBLD CKD-EPI 2021: 30 ML/MIN/1.73M2
ENTEROBACTER SPECIES: NOT DETECTED
ERYTHROCYTE [DISTWIDTH] IN BLOOD BY AUTOMATED COUNT: 15.4 % (ref 10–15)
ESCHERICHIA COLI: DETECTED
EST. AVERAGE GLUCOSE BLD GHB EST-MCNC: 103 MG/DL
GLUCOSE BLDC GLUCOMTR-MCNC: 167 MG/DL (ref 70–99)
GLUCOSE BLDC GLUCOMTR-MCNC: 190 MG/DL (ref 70–99)
GLUCOSE BLDC GLUCOMTR-MCNC: 236 MG/DL (ref 70–99)
GLUCOSE SERPL-MCNC: 203 MG/DL (ref 70–99)
HBA1C MFR BLD: 5.2 %
HCO3 SERPL-SCNC: 22 MMOL/L (ref 22–29)
HCT VFR BLD AUTO: 29.6 % (ref 35–47)
HGB BLD-MCNC: 9.8 G/DL (ref 11.7–15.7)
IMP: NOT DETECTED
KLEBSIELLA OXYTOCA: NOT DETECTED
KLEBSIELLA PNEUMONIAE: NOT DETECTED
KPC: NOT DETECTED
LACTATE SERPL-SCNC: 4.9 MMOL/L (ref 0.7–2)
MCH RBC QN AUTO: 30.6 PG (ref 26.5–33)
MCHC RBC AUTO-ENTMCNC: 33.1 G/DL (ref 31.5–36.5)
MCV RBC AUTO: 93 FL (ref 78–100)
NDM: NOT DETECTED
OXA (DETECTED/NOT DETECTED): NOT DETECTED
PLAT MORPH BLD: NORMAL
PLATELET # BLD AUTO: 93 10E3/UL (ref 150–450)
POTASSIUM SERPL-SCNC: 4.1 MMOL/L (ref 3.4–5.3)
PROTEUS SPECIES: NOT DETECTED
PSEUDOMONAS AERUGINOSA: NOT DETECTED
RBC # BLD AUTO: 3.2 10E6/UL (ref 3.8–5.2)
RBC MORPH BLD: NORMAL
SODIUM SERPL-SCNC: 138 MMOL/L (ref 135–145)
VIM: NOT DETECTED
WBC # BLD AUTO: 22.1 10E3/UL (ref 4–11)

## 2025-03-22 PROCEDURE — 85027 COMPLETE CBC AUTOMATED: CPT | Performed by: NURSE PRACTITIONER

## 2025-03-22 PROCEDURE — 83605 ASSAY OF LACTIC ACID: CPT | Performed by: NURSE PRACTITIONER

## 2025-03-22 PROCEDURE — 250N000011 HC RX IP 250 OP 636: Mod: JZ | Performed by: NURSE PRACTITIONER

## 2025-03-22 PROCEDURE — 250N000013 HC RX MED GY IP 250 OP 250 PS 637: Performed by: NURSE PRACTITIONER

## 2025-03-22 PROCEDURE — 83036 HEMOGLOBIN GLYCOSYLATED A1C: CPT | Performed by: INTERNAL MEDICINE

## 2025-03-22 PROCEDURE — 99232 SBSQ HOSP IP/OBS MODERATE 35: CPT | Performed by: INTERNAL MEDICINE

## 2025-03-22 PROCEDURE — 258N000003 HC RX IP 258 OP 636: Performed by: NURSE PRACTITIONER

## 2025-03-22 PROCEDURE — 99231 SBSQ HOSP IP/OBS SF/LOW 25: CPT | Performed by: UROLOGY

## 2025-03-22 PROCEDURE — 80048 BASIC METABOLIC PNL TOTAL CA: CPT | Performed by: NURSE PRACTITIONER

## 2025-03-22 PROCEDURE — 120N000013 HC R&B IMCU

## 2025-03-22 PROCEDURE — 250N000013 HC RX MED GY IP 250 OP 250 PS 637: Performed by: INTERNAL MEDICINE

## 2025-03-22 PROCEDURE — 36415 COLL VENOUS BLD VENIPUNCTURE: CPT | Performed by: NURSE PRACTITIONER

## 2025-03-22 RX ORDER — CEFAZOLIN SODIUM 2 G/50ML
2 SOLUTION INTRAVENOUS SEE ADMIN INSTRUCTIONS
OUTPATIENT
Start: 2025-03-22

## 2025-03-22 RX ORDER — CEFAZOLIN SODIUM 2 G/50ML
2 SOLUTION INTRAVENOUS
OUTPATIENT
Start: 2025-03-22

## 2025-03-22 RX ORDER — LOSARTAN POTASSIUM 100 MG/1
100 TABLET ORAL DAILY
Status: DISCONTINUED | OUTPATIENT
Start: 2025-03-22 | End: 2025-03-24 | Stop reason: HOSPADM

## 2025-03-22 RX ORDER — ACETAMINOPHEN 650 MG/1
650 SUPPOSITORY RECTAL ONCE
OUTPATIENT
Start: 2025-03-22

## 2025-03-22 RX ORDER — ACETAMINOPHEN 325 MG/1
975 TABLET ORAL ONCE
OUTPATIENT
Start: 2025-03-22 | End: 2025-03-22

## 2025-03-22 RX ORDER — AMLODIPINE BESYLATE 5 MG/1
5 TABLET ORAL DAILY
Status: DISCONTINUED | OUTPATIENT
Start: 2025-03-22 | End: 2025-03-24 | Stop reason: HOSPADM

## 2025-03-22 RX ORDER — METOPROLOL SUCCINATE 100 MG/1
100 TABLET, EXTENDED RELEASE ORAL DAILY
Status: DISCONTINUED | OUTPATIENT
Start: 2025-03-22 | End: 2025-03-24 | Stop reason: HOSPADM

## 2025-03-22 RX ORDER — OXYBUTYNIN CHLORIDE 5 MG/1
15 TABLET, EXTENDED RELEASE ORAL DAILY
Status: DISCONTINUED | OUTPATIENT
Start: 2025-03-22 | End: 2025-03-24 | Stop reason: HOSPADM

## 2025-03-22 RX ADMIN — ACETAMINOPHEN 650 MG: 325 TABLET, FILM COATED ORAL at 18:39

## 2025-03-22 RX ADMIN — PANTOPRAZOLE SODIUM 40 MG: 40 TABLET, DELAYED RELEASE ORAL at 11:36

## 2025-03-22 RX ADMIN — ACETAMINOPHEN 650 MG: 325 TABLET, FILM COATED ORAL at 13:13

## 2025-03-22 RX ADMIN — OXYBUTYNIN CHLORIDE 15 MG: 5 TABLET, EXTENDED RELEASE ORAL at 11:28

## 2025-03-22 RX ADMIN — SODIUM CHLORIDE, SODIUM LACTATE, POTASSIUM CHLORIDE, AND CALCIUM CHLORIDE: .6; .31; .03; .02 INJECTION, SOLUTION INTRAVENOUS at 09:59

## 2025-03-22 RX ADMIN — SENNOSIDES AND DOCUSATE SODIUM 1 TABLET: 50; 8.6 TABLET ORAL at 13:13

## 2025-03-22 RX ADMIN — LOSARTAN POTASSIUM 100 MG: 100 TABLET, FILM COATED ORAL at 11:28

## 2025-03-22 RX ADMIN — CALCIUM CARBONATE (ANTACID) CHEW TAB 500 MG 1000 MG: 500 CHEW TAB at 21:44

## 2025-03-22 RX ADMIN — PIPERACILLIN AND TAZOBACTAM 4.5 G: 4; .5 INJECTION, POWDER, FOR SOLUTION INTRAVENOUS at 11:29

## 2025-03-22 RX ADMIN — HYDROMORPHONE HYDROCHLORIDE 0.4 MG: 0.2 INJECTION, SOLUTION INTRAMUSCULAR; INTRAVENOUS; SUBCUTANEOUS at 20:54

## 2025-03-22 RX ADMIN — PIPERACILLIN AND TAZOBACTAM 4.5 G: 4; .5 INJECTION, POWDER, FOR SOLUTION INTRAVENOUS at 18:20

## 2025-03-22 RX ADMIN — HYDROMORPHONE HYDROCHLORIDE 0.2 MG: 0.2 INJECTION, SOLUTION INTRAMUSCULAR; INTRAVENOUS; SUBCUTANEOUS at 04:31

## 2025-03-22 RX ADMIN — LEVOTHYROXINE SODIUM 250 MCG: 0.12 TABLET ORAL at 11:29

## 2025-03-22 RX ADMIN — PIPERACILLIN AND TAZOBACTAM 4.5 G: 4; .5 INJECTION, POWDER, FOR SOLUTION INTRAVENOUS at 04:15

## 2025-03-22 RX ADMIN — HYDROMORPHONE HYDROCHLORIDE 0.2 MG: 0.2 INJECTION, SOLUTION INTRAMUSCULAR; INTRAVENOUS; SUBCUTANEOUS at 18:49

## 2025-03-22 RX ADMIN — METOPROLOL SUCCINATE 100 MG: 100 TABLET, EXTENDED RELEASE ORAL at 11:28

## 2025-03-22 RX ADMIN — AMLODIPINE BESYLATE 5 MG: 5 TABLET ORAL at 11:28

## 2025-03-22 ASSESSMENT — ACTIVITIES OF DAILY LIVING (ADL)
ADLS_ACUITY_SCORE: 28
ADLS_ACUITY_SCORE: 28
ADLS_ACUITY_SCORE: 29
ADLS_ACUITY_SCORE: 29
ADLS_ACUITY_SCORE: 28
ADLS_ACUITY_SCORE: 29
ADLS_ACUITY_SCORE: 28
ADLS_ACUITY_SCORE: 29
ADLS_ACUITY_SCORE: 28
ADLS_ACUITY_SCORE: 28
ADLS_ACUITY_SCORE: 26
ADLS_ACUITY_SCORE: 29
ADLS_ACUITY_SCORE: 28

## 2025-03-22 NOTE — H&P
[facility]  Admission History and Physical Examination    NAME: Enoch Barrios   : 1980   MRN: [id number]     Date of Admission:  3/21/2025    Assessment & Plan   Enoch Barrios is a 44 year old female who presents with ABD and flank pain found to have UTI.     UTI secondary to obstructive nephrolithiasis  Previous history of nephrolithiasis  Patient presented to the emergency department with abrupt right-sided flank and abdominal pain.  CT pained showed kidney stone with concerns of pyelonephritis.  Case was discussed with urology who recommends stenting, unclear timing today versus tomorrow.  Patient denies a history of resistant bacteria.  -Ceftriaxone  -Urology consult  -Follow urine culture  -Blood cultures ordered  -N. p.o. for procedure  -As needed pain meds  -Gentle IV fluids overnight.               Addendum: Went to OR tonight for stent placement. Lactate continues to rise to 5.6 despite 3L of fluids. Was hypertensive but now BP is stable with SBP in 130's, clinically appears well per nursing. Has been adequately fluid resuscitated, suspect shock due to infection.   - Broaden to Zosyn  - Given 4th L of fluid, would be judicious of additional fluid to prevent overload/pulmonary edema  - Recheck Lactate at 2350  - Move to intermediate bed  - If BP drops or patient clinically changes, would need to move to ICU for potential pressors.   - Discussed with Dr. Duron    Hepatic steatosis  -Encouraged lifestyle changes, follow-up with PCP    CKD stage III  -Creatinine is at baseline around 1.4.    Chronic conditions  Hypothyroidism: Continue levothyroxine  Hypertension: Continue beta-blocker, hold amlodipine and lisinopril in setting of infection.    Awaiting formal pharmacy reconciliation to resume home medications.     DVT Prophylaxis: Ambulate every shift   Code Status: Full Code  COVID PCR TESTING STATUS: NA  Family updated with plan of care: NA       Expected Discharge Date:  03/23/2025               MARY Kaufman CNP    Primary Care Physician   [facility]    Chief Complaint   Right flank pain    History is obtained from the patient    Discussed with Dr. Escalona in the ED, full chart review including lab work, imaging, and vital signs were reviewed. Patient received antibiotics, IV pain meds,  and IV fluids in the ED,  spoke with urology who recommended consult and stenting.  Will be admitted for pain control, IV antibiotics, and further management.      History of Present Illness   Enoch Barrios is a 44 year old female who presents with past medical history of kidney stones, hypertension, hypothyroidism, obesity, who presents to the emergency department with abrupt right flank pain.  Patient states she was eating lunch around noon when she all of a sudden had right flank pain.  She denies any fevers, chills, rigors, chest pain, shortness of breath, urinary symptoms, or diarrhea.  She has had the symptoms in the past and the pain did not improve so she presented to the emergency department for further evaluation.  In the emergency department CT showed evidence of obstructing stone and concerns for developing pyelonephritis.  Was given IV antibiotics and IV fluids.  Case was discussed with urology who recommended admission and stenting.  Patient denies recent UTI, resistant bacteria, or recent antibiotic use.      Past Medical History    I have reviewed this patient's medical history and updated it with pertinent information if needed.   Past Medical History:   Diagnosis Date    Abnormal Pap smear     colposcopy    Chronic kidney disease     Gastroesophageal reflux disease     Hypertension     Thyroid disease     on thyroid meds/hypothyroid       Past Surgical History   I have reviewed this patient's surgical history and updated it with pertinent information if needed.  Past Surgical History:   Procedure Laterality Date    COMBINED CYSTOSCOPY, INSERT STENT  URETER(S) Right 3/24/2020    Procedure: Video cystoscopy, right double-J stent placement (5-French x24 cm).;  Surgeon: Joseph Dangelo MD;  Location: [facility] OR    CYSTOSCOPY, RETROGRADES, INSERT STENT URETER(S), COMBINED Right 9/3/2015    Procedure: COMBINED CYSTOSCOPY, RETROGRADES, INSERT STENT URETER(S);  Surgeon: Joseph Dangelo MD;  Location: [facility] OR    DILATION AND CURETTAGE SUCTION N/A 5/7/2016    Procedure: DILATION AND CURETTAGE SUCTION;  Surgeon: Albert Hammer MD;  Location: [facility] OR    OPEN REDUCTION INTERNAL FIXATION FOOT Right 12/2/2019    Procedure: Open reduction internal fixation right LisFranc fracture, open reduction internal fixation right third metatarsal base fracture;  Surgeon: Monroe Skelton MD;  Location: [facility] OR       Prior to Admission Medications   Prior to Admission Medications   Prescriptions Last Dose Informant Patient Reported? Taking?   Levothyroxine Sodium (SYNTHROID PO)   Yes No   Sig: Take 250 mcg by mouth daily    acetaminophen (TYLENOL) 325 MG tablet   No No   Sig: Take 3 tablets (975 mg) by mouth every 6 hours Scheduled x48hrs, then 650-975mg every 6hrs as needed, pain. Alternate with ibuprofen   metoprolol (LOPRESSOR) 50 MG tablet   Yes No   Sig: Take 50 mg by mouth 2 times daily   omeprazole 20 MG tablet   Yes No   Sig: Take 20 mg by mouth daily   oxyCODONE (ROXICODONE) 5 MG tablet   No No   Sig: Take 1 tablet (5 mg) by mouth every 6 hours as needed for severe pain   senna (SENOKOT) 8.6 MG tablet   No No   Sig: Take 1-2 tablets by mouth 2 times daily as needed for constipation      Facility-Administered Medications: None     Allergies   No Known Allergies    Social History   I have reviewed this patient's social history and updated it with pertinent information if needed. Enoch M Sophie  reports that she has never smoked. She has never used smokeless tobacco. She reports that she does not drink alcohol and does not use drugs.    Family History   I have reviewed  this patient's family history and updated it with pertinent information if needed.   No family history on file.    Review of Systems   The 10 point Review of Systems is negative other than noted in the HPI or here.     Physical Exam   Temp: 97.9  F (36.6  C) Temp src: Oral BP: (!) 182/177 Pulse: 88   Resp: 22 SpO2: 98 % O2 Device: None (Room air)    Vital Signs with Ranges  Temp:  [97.9  F (36.6  C)] 97.9  F (36.6  C)  Pulse:  [79-88] 88  Resp:  [22] 22  BP: (167-182)/(121-177) 182/177  SpO2:  [98 %-100 %] 98 %  0 lbs 0 oz    Constitutional: Awake, alert,  no apparent distress.  Eyes: Conjunctiva and pupils examined and normal.  HEENT: Moist mucous membranes, normal dentition.  Respiratory: Clear to auscultation bilaterally, no crackles or wheezing.  Cardiovascular: Regular rate and rhythm, normal S1 and S2, and no murmur noted.  GI: Soft, non-distended, non-tender, bowel sounds present.  Mild right-sided flank pain with palpation Lymph/Hematologic: No anterior cervical or supraclavicular adenopathy.  Skin: No rashes, no cyanosis, no edema.  Musculoskeletal: No deformities noted.  No erythema or tenderness. Moving all extremities.  Neurologic: No focal deficits noted. Speech is clear. Coordination and strength grossly normal.   Psychiatric: Appropriate affect.    Data   Data reviewed today:      EKG:   Imaging:   Recent Results (from the past 24 hours)   CT Abdomen Pelvis w Contrast    Narrative    EXAM: CT ABDOMEN PELVIS W CONTRAST  LOCATION: [facility]  DATE: 3/21/2025    INDICATION: Right lower quadrant and flank pain  COMPARISON: CT 3/24/2020  TECHNIQUE: CT scan of the abdomen and pelvis was performed following injection of IV contrast. Multiplanar reformats were obtained. Dose reduction techniques were used.  CONTRAST: 100mL Isovue 370    FINDINGS:   LOWER CHEST: Minimal bibasilar hypoventilatory changes without guille airspace consolidation or pleural effusion.    HEPATOBILIARY: Diffuse  hepatic steatosis. No evidence of cholecystitis or biliary obstruction.    PANCREAS: Normal.    SPLEEN: Normal.    ADRENAL GLANDS: Normal.    KIDNEYS/BLADDER: Persistent or recurrent moderate right hydroureteronephrosis to the level of a 6 mm stone in the distal right ureter, could be new or slightly progressed in comparison to prior CT. Degree of perinephric and periureteric stranding is not   significantly changed. There is persistent cortical thinning in the right kidney, suggestive of chronic process. Punctate nonobstructing stone in upper pole the left kidney. No ureterolithiasis or hydronephrosis on the left. Urinary bladder is   unremarkable.    BOWEL: Diverticulosis of the colon. No acute inflammatory change. No obstruction. Normal appendix.    LYMPH NODES: No suspicious lymphadenopathy. No abdominopelvic free fluid.    VASCULATURE: Normal.    PELVIC ORGANS: Normal.    MUSCULOSKELETAL: No acute bony abnormality.      Impression    IMPRESSION:   1.  Likely chronically obstructing 6 mm in the distal right ureter with persistent moderate hydroureteronephrosis and perinephric/periureteric stranding. If this is the same stone that was seen on prior CT, it is slightly progressed since 2020.   Significant cortical thinning in the right kidney also suggests chronic nature of this obstruction.  2.  While degree of perinephric/periureteric stranding is similar to prior CT, recommend correlation with urinalysis to exclude superimposed urinary tract infection.  3.  Diffuse hepatic steatosis.         Recent Labs   Lab 03/21/25  1557   WBC 16.0*   HGB 12.2   MCV 92         POTASSIUM 3.8   CHLORIDE 101   CO2 23   BUN 11.0   CR 1.40*   ANIONGAP 12   YENY 10.4   *   ALBUMIN 4.1   PROTTOTAL 7.1   BILITOTAL 0.4   ALKPHOS 101   ALT 17   AST 24   LIPASE 43           MARY Kaufman CNP  [facility]  March 21, 2025  Securely message with the Vocera Web Console (learn more here)  Text page via  [facility] Paging/Directory

## 2025-03-22 NOTE — PROVIDER NOTIFICATION
Dr. Estrada notified regarding elevated blood pressure.  Patient denies headache/vision changes.  No intervention nor recheck needed per provider. Ok to proceed with planned procedure.

## 2025-03-22 NOTE — OP NOTE
SURGEON:  Ruben Zacarias MD     PREOPERATIVE DIAGNOSIS:  Right ureteral stone and urinary tract infection     POSTOPERATIVE DIAGNOSIS:  Right ureteral stone and urinary tract infection     PROCEDURE PERFORMED:  Cystoscopy, right retrograde pyelogram, interpretation of fluoroscopic images. Right ureteral stent placement     ANESTHESIA:  General.     COMPLICATIONS:  None.    INDICATIONS FOR PROCEDURE: This is a 44-year-old woman with a right ureteral stone and urinary tract infection who now presents for right stent placement    DESCRIPTION OF PROCEDURE: The risks and benefits of the procedure were explained in detail to the patient and informed consent was obtained.  The patient was brought to the operating room and placed supine on the operating room table and underwent general endotracheal anesthetic.  The patient was moved down to the dorsal lithotomy position and was prepped and draped in the standard sterile fashion.  I inserted the 22 Tongan rigid cystoscope through the urethra into the bladder and I performed cystoscopy.  There were no urothelial abnormalities identified.  I identified the right ureteral orifice and cannulated the orifice with a ureteral catheter.  I performed a retrograde pyelogram.  There was hydronephrosis and hydroureter on the right side  I passed a Glidewire into the left kidney under fluoroscopic guidance and then I backloaded off the ureteral catheter.  I then placed a 6 x 26 double-J ureteral stent over the Glidewire.  I pulled back the wire and a good curl was seen in the renal pelvis under fluoroscopy.  A good curl was seen in the bladder under direct visualization.  The patient tolerated the procedure well without complication.  The patient went to the postanesthetic care unit in good condition.

## 2025-03-22 NOTE — ANESTHESIA CARE TRANSFER NOTE
Patient: Enoch Barrios    Procedure: Procedure(s):  CYSTOSCOPY, WITH URETERAL RIGHT STENT INSERTION, RETROGRADES, PYELOGRAM       Diagnosis: Right ureteral stone [N20.1]  Diagnosis Additional Information: No value filed.    Anesthesia Type:   General     Note:    Oropharynx: oropharynx clear of all foreign objects and spontaneously breathing  Level of Consciousness: drowsy  Oxygen Supplementation: face mask  Level of Supplemental Oxygen (L/min / FiO2): 6  Independent Airway: airway patency satisfactory and stable  Dentition: dentition unchanged  Vital Signs Stable: post-procedure vital signs reviewed and stable  Report to RN Given: handoff report given  Patient transferred to: PACU    Handoff Report: Identifed the Patient, Identified the Reponsible Provider, Reviewed the pertinent medical history, Discussed the surgical course, Reviewed Intra-OP anesthesia mangement and issues during anesthesia, Set expectations for post-procedure period and Allowed opportunity for questions and acknowledgement of understanding  Vitals:  Vitals Value Taken Time   /82 03/21/25 2115   Temp 99.32  F (37.4  C) 03/21/25 2119   Pulse 116 03/21/25 2119   Resp 18 03/21/25 2119   SpO2 100 % 03/21/25 2119   Vitals shown include unfiled device data.    Electronically Signed By: MARY Laura CRNA  March 21, 2025  9:20 PM

## 2025-03-22 NOTE — PROGRESS NOTES
Security brought patients envelope of belongings up to MS5 per patient request. Envelope delivered to patient by this RN.

## 2025-03-22 NOTE — OR NURSING
DATE/TIME OF CALL RECEIVED FROM LAB:  03/21/25 at 8:09 PM   LAB TEST:  lactic acid   LAB VALUE:  4.5  PROVIDER NOTIFIED?: Yes  PROVIDER NAME: Dr. Zacarias and MARY Smith  DATE/TIME LAB VALUE REPORTED TO PROVIDER: 3/21/25 @ 2011  MECHANISM OF PROVIDER NOTIFICATION:  text page sent to lashawn reeves message sent to Luis  PROVIDER RESPONSE: Luis asked to give the patient more fluids, MDA updated with request.      Gayle Colón RN on 3/21/2025 at 8:13 PM      Lactic acid redraw elevated, to 5.6, Luis notified, see flow sheet.  No new orders at this time    Gayle Colón RN on 3/21/2025 at 10:17 PM

## 2025-03-22 NOTE — PHARMACY-ADMISSION MEDICATION HISTORY
Pharmacist Admission Medication History    Admission medication history is complete. The information provided in this note is only as accurate as the sources available at the time of the update.    Information Source(s): Patient via in-person    Changes made to PTA medication list:  Added: None  Deleted: None  Changed: None    Allergies reviewed with patient and updates made in EHR: yes    Medication History Completed By: Reji Myles [facility] 3/22/2025 9:35 AM    PTA Med List   Medication Sig Last Dose/Taking    acetaminophen (TYLENOL) 650 MG CR tablet Take 650 mg by mouth daily as needed for pain. Taking As Needed    amLODIPine (NORVASC) 5 MG tablet Take 5 mg by mouth daily. Past Week    levothyroxine (SYNTHROID/LEVOTHROID) 200 MCG tablet Take 400 mcg by mouth daily. Past Week    losartan (COZAAR) 100 MG tablet Take 100 mg by mouth daily. Past Week    metoprolol succinate ER (TOPROL XL) 100 MG 24 hr tablet Take 100 mg by mouth daily. Past Week    omeprazole 20 MG tablet Take 20 mg by mouth daily Past Week

## 2025-03-22 NOTE — PROGRESS NOTES
Urology    Pain improved  Afebrile but still significant leukocytosis  Blood cultures growing gram-negative rods    A/P: Continue antibiotics as we await culture results  We discussed the procedure that we will be required to remove her stone after she has been treated with antibiotics: Cystoscopy with ureteral stent removal, ureteroscopy with laser lithotripsy and stone basketing and possible stent placement.  We discussed the risks of the procedure.  This will be arranged for her as an outpatient after she discharges from the hospital.

## 2025-03-22 NOTE — ANESTHESIA PROCEDURE NOTES
Airway       Patient location during procedure: OR       Procedure Start/Stop Times: 3/21/2025 8:48 PM  Staff -        Anesthesiologist:  Chery Estrada MD       Performed By: anesthesiologist  Consent for Airway        Urgency: elective  Indications and Patient Condition       Indications for airway management: fredy-procedural       Induction type:RSI       Mask difficulty assessment: 0 - not attempted    Final Airway Details       Final airway type: endotracheal airway       Successful airway: ETT - single and Oral  Endotracheal Airway Details        ETT size (mm): 7.0       Successful intubation technique: video laryngoscopy       VL Blade Size: Glidescope 3       Grade View of Cords: 1       Adjucts: stylet       Position: Right       Measured from: gums/teeth       Secured at (cm): 22       Bite block used: None    Post intubation assessment        Placement verified by: capnometry, equal breath sounds and chest rise        Number of attempts at approach: 1       Number of other approaches attempted: 0       Secured with: tape       Ease of procedure: easy       Dentition: Intact and Unchanged    Medication(s) Administered   Medication Administration Time: 3/21/2025 8:48 PM

## 2025-03-22 NOTE — PROGRESS NOTES
[facility]    Medicine Progress Note - Hospitalist Service    Date of Admission:  3/21/2025    Assessment & Plan   Enoch Barrios is a 44 year old female admitted on 3/21/2025 with obstructive ureterolithiasis and was taken to the OR for stenting.      Ms. Barrios had come to attention with abrupt onset right flank pain and was noted to have a UTI. She had not reported fevers, but UA was strongly consistent with UTI, WBC 16, CT showed perinephric stranding and the LA was elevated.      Following the procedure, the pt's lactate had continued to rise despite vigorous fluid resuscitation. Ultimately, his blood cultures have grown E coli (sens pending).     DX:  Septic shock (elevated WBC, tachycardia, fluid-responsive septic shock, elevated lactic acid) due to E coli bacteremia (Verigene testing was negative for identified resistance genes.) Resolved after fluid resuscitation and treatment of the underlying infection.  Obstructive ureterolithiasis.  POD #1 s/p right ureteral stenting.   CKD stage 3a (suspected). Baseline not completely clear. Acute kidney injury.  HTN.   Elevated glucose.  Fatty liver without evidence of hepatitis.  Plan:   Plan to switch to Ceftriaxone  2 grams IV.  Monitor carefully for volume overload.   Check HbA1c.  Hold Losartan for now.   Encourage oral intake. OK to stop IVF.           Diet: Regular Diet Adult    DVT Prophylaxis: Pneumatic Compression Devices  Erazo Catheter: Not present  Lines: None     Cardiac Monitoring: None  Code Status: Full Code      Clinically Significant Risk Factors Present on Admission                   # Hypertension: Home medication list includes antihypertensive(s)                      Social Drivers of Health            Disposition Plan     Medically Ready for Discharge: Anticipated in 2-4 Days       Yovani Hand MD  Hospitalist Service  [facility]  Securely message with [employer] (more info)  Text page via [employer]  [place]oree/Directory   ______________________________________________________________________    Interval History   Chart reviewed, pt interviewed.   Generally doing well. No N/V. Reports she is urinating well.  No stool as yet. No  SOB or cough. Feels edematous.    Physical Exam   Vital Signs: Temp: 98.8  F (37.1  C) Temp src: Oral BP: (!) 140/94 Pulse: 115   Resp: 22 SpO2: 93 % O2 Device: None (Room air) Oxygen Delivery: 8 LPM  Weight: 0 lbs 0 oz    General Appearance: Alert and coherent in NAD.   Respiratory: CTA  Cardiovascular: RRR without murmur  GI: soft, NTND  Skin: no lesions.  Other: No pitting edema     Medical Decision Making       40 MINUTES SPENT BY ME on the date of service doing chart review, history, exam, documentation & further activities per the note.      Data   Results for orders placed or performed during the hospital encounter of 03/21/25 (from the past 24 hours)   D dimer quantitative   Result Value Ref Range    D-Dimer Quantitative 0.34 0.00 - 0.50 ug/mL FEU    Narrative    This D-dimer assay is intended for use in conjunction with a clinical pretest probability assessment model to exclude pulmonary embolism (PE) and deep venous thrombosis (DVT) in outpatients suspected of PE or DVT. The cut-off value is 0.50 ug/mL FEU.   CBC with platelets differential    Narrative    The following orders were created for panel order CBC with platelets differential.  Procedure                               Abnormality         Status                     ---------                               -----------         ------                     CBC with platelets and ...[[id number]]  Abnormal            Final result                 Please view results for these tests on the individual orders.   Comprehensive metabolic panel   Result Value Ref Range    Sodium 136 135 - 145 mmol/L    Potassium 3.8 3.4 - 5.3 mmol/L    Carbon Dioxide (CO2) 23 22 - 29 mmol/L    Anion Gap 12 7 - 15 mmol/L    Urea Nitrogen 11.0 6.0 - 20.0  mg/dL    Creatinine 1.40 (H) 0.51 - 0.95 mg/dL    GFR Estimate 47 (L) >60 mL/min/1.73m2    Calcium 10.4 8.8 - 10.4 mg/dL    Chloride 101 98 - 107 mmol/L    Glucose 135 (H) 70 - 99 mg/dL    Alkaline Phosphatase 101 40 - 150 U/L    AST 24 0 - 45 U/L    ALT 17 0 - 50 U/L    Protein Total 7.1 6.4 - 8.3 g/dL    Albumin 4.1 3.5 - 5.2 g/dL    Bilirubin Total 0.4 <=1.2 mg/dL   Lipase   Result Value Ref Range    Lipase 43 13 - 60 U/L   HCG qualitative Blood   Result Value Ref Range    hCG Serum Qualitative Negative Negative   CBC with platelets and differential   Result Value Ref Range    WBC Count 16.0 (H) 4.0 - 11.0 10e3/uL    RBC Count 3.95 3.80 - 5.20 10e6/uL    Hemoglobin 12.2 11.7 - 15.7 g/dL    Hematocrit 36.4 35.0 - 47.0 %    MCV 92 78 - 100 fL    MCH 30.9 26.5 - 33.0 pg    MCHC 33.5 31.5 - 36.5 g/dL    RDW 14.4 10.0 - 15.0 %    Platelet Count 203 150 - 450 10e3/uL    % Neutrophils 77 %    % Lymphocytes 14 %    % Monocytes 2 %    % Eosinophils 5 %    % Basophils 1 %    % Immature Granulocytes 1 %    NRBCs per 100 WBC 0 <1 /100    Absolute Neutrophils 12.4 (H) 1.6 - 8.3 10e3/uL    Absolute Lymphocytes 2.3 0.8 - 5.3 10e3/uL    Absolute Monocytes 0.3 0.0 - 1.3 10e3/uL    Absolute Eosinophils 0.7 0.0 - 0.7 10e3/uL    Absolute Basophils 0.1 0.0 - 0.2 10e3/uL    Absolute Immature Granulocytes 0.2 <=0.4 10e3/uL    Absolute NRBCs 0.0 10e3/uL   HCG qualitative urine   Result Value Ref Range    hCG Urine Qualitative Negative Negative   UA with Microscopic   Result Value Ref Range    Color Urine Orange (A) Colorless, Straw, Light Yellow, Yellow    Appearance Urine Slightly Cloudy (A) Clear    Glucose Urine Negative Negative mg/dL    Bilirubin Urine Negative Negative    Ketones Urine Negative Negative mg/dL    Specific Gravity Urine 1.013 1.003 - 1.035    Blood Urine Small (A) Negative    pH Urine 6.0 5.0 - 7.0    Protein Albumin Urine 70 (A) Negative mg/dL    Urobilinogen Urine Normal Normal, 2.0 mg/dL    Nitrite Urine Negative  Negative    Leukocyte Esterase Urine Large (A) Negative    Bacteria Urine Few (A) None Seen /HPF    WBC Clumps Urine Present (A) None Seen /HPF    Mucus Urine Present (A) None Seen /LPF    RBC Urine 49 (H) <=2 /HPF    WBC Urine >182 (H) <=5 /HPF    Squamous Epithelials Urine 20 (H) <=1 /HPF   CT Abdomen Pelvis w Contrast    Narrative    EXAM: CT ABDOMEN PELVIS W CONTRAST  LOCATION: [facility]  DATE: 3/21/2025    INDICATION: Right lower quadrant and flank pain  COMPARISON: CT 3/24/2020  TECHNIQUE: CT scan of the abdomen and pelvis was performed following injection of IV contrast. Multiplanar reformats were obtained. Dose reduction techniques were used.  CONTRAST: 100mL Isovue 370    FINDINGS:   LOWER CHEST: Minimal bibasilar hypoventilatory changes without guille airspace consolidation or pleural effusion.    HEPATOBILIARY: Diffuse hepatic steatosis. No evidence of cholecystitis or biliary obstruction.    PANCREAS: Normal.    SPLEEN: Normal.    ADRENAL GLANDS: Normal.    KIDNEYS/BLADDER: Persistent or recurrent moderate right hydroureteronephrosis to the level of a 6 mm stone in the distal right ureter, could be new or slightly progressed in comparison to prior CT. Degree of perinephric and periureteric stranding is not   significantly changed. There is persistent cortical thinning in the right kidney, suggestive of chronic process. Punctate nonobstructing stone in upper pole the left kidney. No ureterolithiasis or hydronephrosis on the left. Urinary bladder is   unremarkable.    BOWEL: Diverticulosis of the colon. No acute inflammatory change. No obstruction. Normal appendix.    LYMPH NODES: No suspicious lymphadenopathy. No abdominopelvic free fluid.    VASCULATURE: Normal.    PELVIC ORGANS: Normal.    MUSCULOSKELETAL: No acute bony abnormality.      Impression    IMPRESSION:   1.  Likely chronically obstructing 6 mm in the distal right ureter with persistent moderate hydroureteronephrosis and  perinephric/periureteric stranding. If this is the same stone that was seen on prior CT, it is slightly progressed since 2020.   Significant cortical thinning in the right kidney also suggests chronic nature of this obstruction.  2.  While degree of perinephric/periureteric stranding is similar to prior CT, recommend correlation with urinalysis to exclude superimposed urinary tract infection.  3.  Diffuse hepatic steatosis.     Blood Culture Arm, Right    Specimen: Arm, Right; Blood   Result Value Ref Range    Culture Positive on the 1st day of incubation (A)     Culture Gram negative bacilli (AA)    Blood Culture Arm, Left    Specimen: Arm, Left; Blood   Result Value Ref Range    Culture Positive on the 1st day of incubation (A)     Culture Gram negative bacilli (AA)    Lactic Acid Whole Blood with 1X Repeat in 2 HR when >2   Result Value Ref Range    Lactic Acid, Initial 4.5 (HH) 0.7 - 2.0 mmol/L   Verigene GN Panel    Specimen: Arm, Left; Blood   Result Value Ref Range    Acinetobacter species Not Detected Not Detected    Citrobacter species Not Detected Not Detected    Enterobacter species Not Detected Not Detected    Proteus species Not Detected Not Detected    Escherichia coli Detected (A) Not Detected    Klebsiella pneumoniae Not Detected Not Detected    Klebsiella oxytoca Not Detected Not Detected    Pseudomonas aeruginosa Not Detected Not Detected    CTX-M Not Detected Not Detected, NA    KPC Not Detected Not Detected, NA    NDM Not Detected Not Detected, NA    VIM Not Detected Not Detected, NA    IMP Not Detected Not Detected, NA    OXA Not Detected Not Detected, NA    Narrative    Specimen tested with Verigene multiplex, gram-negative blood culture nucleic acid test for the following targets: Acinetobacter species, Citrobacter species, Enterobacter species, Proteus species, Escherichia coli, Klebsiella pneumoniae, Klebsiella oxytoca, Pseudomonas aeruginosa, and the following resistance markers: CTX-M,  KPC, NDM, VIM, IMP and OXA.   XR Surgery PINKY L/T 5 Min Fluoro w Stills    Narrative    This exam was marked as non-reportable because it will not be read by a   radiologist or a [place] non-radiologist provider.         Lactic acid whole blood   Result Value Ref Range    Lactic Acid 5.6 (HH) 0.7 - 2.0 mmol/L   Lactic acid whole blood   Result Value Ref Range    Lactic Acid 4.9 (HH) 0.7 - 2.0 mmol/L   Glucose by meter   Result Value Ref Range    GLUCOSE BY METER POCT 236 (H) 70 - 99 mg/dL   Glucose by meter   Result Value Ref Range    GLUCOSE BY METER POCT 190 (H) 70 - 99 mg/dL   Basic metabolic panel   Result Value Ref Range    Sodium 138 135 - 145 mmol/L    Potassium 4.1 3.4 - 5.3 mmol/L    Chloride 106 98 - 107 mmol/L    Carbon Dioxide (CO2) 22 22 - 29 mmol/L    Anion Gap 10 7 - 15 mmol/L    Urea Nitrogen 16.6 6.0 - 20.0 mg/dL    Creatinine 2.02 (H) 0.51 - 0.95 mg/dL    GFR Estimate 30 (L) >60 mL/min/1.73m2    Calcium 8.8 8.8 - 10.4 mg/dL    Glucose 203 (H) 70 - 99 mg/dL   CBC with platelets   Result Value Ref Range    WBC Count 22.1 (H) 4.0 - 11.0 10e3/uL    RBC Count 3.20 (L) 3.80 - 5.20 10e6/uL    Hemoglobin 9.8 (L) 11.7 - 15.7 g/dL    Hematocrit 29.6 (L) 35.0 - 47.0 %    MCV 93 78 - 100 fL    MCH 30.6 26.5 - 33.0 pg    MCHC 33.1 31.5 - 36.5 g/dL    RDW 15.4 (H) 10.0 - 15.0 %    Platelet Count 93 (L) 150 - 450 10e3/uL   RBC and Platelet Morphology   Result Value Ref Range    RBC Morphology Confirmed RBC Indices     Platelet Assessment  Automated Count Confirmed. Platelet morphology is normal.     Automated Count Confirmed. Platelet morphology is normal.   Glucose by meter   Result Value Ref Range    GLUCOSE BY METER POCT 167 (H) 70 - 99 mg/dL

## 2025-03-22 NOTE — PLAN OF CARE
"Pertinent assessments: Pt A&Ox4, up SBA. Elevated pressures, started desating on RA now stable on 2L NC, afebrile. C/o 4/10 pain to R side, prn dilaudid x1. Denies nausea, or sob. LR infusing @ 125. Q4 BG checks: 236 & 190. No orders for insulin, paged cross-cover to see if they wanted to treat BG, no new orders at this time. Some burning w/ first void after cysto, pink tinged urine.    Major Shift Events: Admitted to unit, Positive blood cultures    Treatment Plan: IV abx, pain management, IVF    Bedside Nurse: Araseli Erickson RN     Problem: Adult Inpatient Plan of Care  Goal: Plan of Care Review  Description: The Plan of Care Review/Shift note should be completed every shift.  The Outcome Evaluation is a brief statement about your assessment that the patient is improving, declining, or no change.  This information will be displayed automatically on your shiftnote.  Outcome: Progressing  Flowsheets (Taken 3/22/2025 0619)  Outcome Evaluation: POD1 stent placement, pain controlled w/ dilaudid x1, voiding w/o issue, IVF, IV abx, lactic acid trending down  Plan of Care Reviewed With: patient  Overall Patient Progress: improving  Goal: Patient-Specific Goal (Individualized)  Description: You can add care plan individualizations to a care plan. Examples of Individualization might be:  \"Parent requests to be called daily at 9am for status\", \"I have a hard time hearing out of my right ear\", or \"Do not touch me to wake me up as it startlesme\".  Outcome: Progressing  Goal: Absence of Hospital-Acquired Illness or Injury  Outcome: Progressing  Intervention: Identify and Manage Fall Risk  Recent Flowsheet Documentation  Taken 3/22/2025 0040 by Araseli Erickson, RN  Safety Promotion/Fall Prevention:   activity supervised   clutter free environment maintained   increased rounding and observation   lighting adjusted   nonskid shoes/slippers when out of bed   patient and family education   room near nurse's station  Intervention: Prevent " Skin Injury  Recent Flowsheet Documentation  Taken 3/22/2025 0040 by Araseli Erickson RN  Body Position: position changed independently  Intervention: Prevent and Manage VTE (Venous Thromboembolism) Risk  Recent Flowsheet Documentation  Taken 3/22/2025 0040 by Araseli Erickson RN  VTE Prevention/Management: SCDs on (sequential compression devices)  Intervention: Prevent Infection  Recent Flowsheet Documentation  Taken 3/22/2025 0040 by Araseli Erickson RN  Infection Prevention:   cohorting utilized   rest/sleep promoted   single patient room provided  Goal: Optimal Comfort and Wellbeing  Outcome: Progressing  Goal: Readiness for Transition of Care  Outcome: Progressing  Intervention: Mutually Develop Transition Plan  Recent Flowsheet Documentation  Taken 3/22/2025 0000 by Araseli Erickson RN  Equipment Currently Used at Home: none   Goal Outcome Evaluation:      Plan of Care Reviewed With: patient    Overall Patient Progress: improvingOverall Patient Progress: improving    Outcome Evaluation: POD1 stent placement, pain controlled w/ dilaudid x1, voiding w/o issue, IVF, IV abx, lactic acid trending down

## 2025-03-22 NOTE — PROGRESS NOTES
03/22/25 0040   Skin   Skin WDL WDL;all   Skin Color without discoloration   Skin Temperature warm   Skin Moisture dry   Skin Elasticity quick return to original state   Skin Integrity bruised (ecchymotic)   Bruised (ecchymotic) location Right;Upper Arm   Device Skin Interventions Taken No adjustments needed     Admission/Transfer from: PACU  2 RN skin assessment completed. Yes  Significant findings include: Bruise to R antecubital.  LDA added (if applicable)? NO  Requested WOC Nurse Consult from MD? Not Applicable

## 2025-03-22 NOTE — ED NOTES
[facility]  ED Nurse Handoff Report    ED Chief complaint: Abdominal Pain  . ED Diagnosis:   Final diagnoses:   Right ureteral stone       Allergies: No Known Allergies    Code Status: Full Code    Activity level - Baseline/Home:  independent.  Activity Level - Current:   independent.   Lift room needed: No.   Bariatric: No   Needed: No   Isolation: No.   Infection: Not Applicable.     Respiratory status: Room air    Vital Signs (within 30 minutes):   Vitals:    03/21/25 1530 03/21/25 1632 03/21/25 1635 03/21/25 1638   BP: (!) 177/121 (!) 167/135     Pulse: 79 88     Resp: 22      Temp: 97.9  F (36.6  C)      TempSrc: Oral      SpO2: 99% 98% 100% 99%   Height:           Cardiac Rhythm:  ,      Pain level:    Patient confused: No.   Patient Falls Risk: nonskid shoes/slippers when out of bed.   Elimination Status: Has voided     Patient Report - Initial Complaint: flank pain.   Focused Assessment: kidney stone     Abnormal Results:   Labs Ordered and Resulted from Time of ED Arrival to Time of ED Departure   COMPREHENSIVE METABOLIC PANEL - Abnormal       Result Value    Sodium 136      Potassium 3.8      Carbon Dioxide (CO2) 23      Anion Gap 12      Urea Nitrogen 11.0      Creatinine 1.40 (*)     GFR Estimate 47 (*)     Calcium 10.4      Chloride 101      Glucose 135 (*)     Alkaline Phosphatase 101      AST 24      ALT 17      Protein Total 7.1      Albumin 4.1      Bilirubin Total 0.4     ROUTINE UA WITH MICROSCOPIC - Abnormal    Color Urine Orange (*)     Appearance Urine Slightly Cloudy (*)     Glucose Urine Negative      Bilirubin Urine Negative      Ketones Urine Negative      Specific Gravity Urine 1.013      Blood Urine Small (*)     pH Urine 6.0      Protein Albumin Urine 70 (*)     Urobilinogen Urine Normal      Nitrite Urine Negative      Leukocyte Esterase Urine Large (*)     Bacteria Urine Few (*)     WBC Clumps Urine Present (*)     Mucus Urine Present (*)     RBC Urine  49 (*)     WBC Urine >182 (*)     Squamous Epithelials Urine 20 (*)    CBC WITH PLATELETS AND DIFFERENTIAL - Abnormal    WBC Count 16.0 (*)     RBC Count 3.95      Hemoglobin 12.2      Hematocrit 36.4      MCV 92      MCH 30.9      MCHC 33.5      RDW 14.4      Platelet Count 203      % Neutrophils 77      % Lymphocytes 14      % Monocytes 2      % Eosinophils 5      % Basophils 1      % Immature Granulocytes 1      NRBCs per 100 WBC 0      Absolute Neutrophils 12.4 (*)     Absolute Lymphocytes 2.3      Absolute Monocytes 0.3      Absolute Eosinophils 0.7      Absolute Basophils 0.1      Absolute Immature Granulocytes 0.2      Absolute NRBCs 0.0     D DIMER QUANTITATIVE - Normal    D-Dimer Quantitative 0.34     LIPASE - Normal    Lipase 43     HCG QUALITATIVE PREGNANCY - Normal    hCG Serum Qualitative Negative     HCG QUALITATIVE URINE - Normal    hCG Urine Qualitative Negative     LACTIC ACID WHOLE BLOOD WITH 1X REPEAT IN 2 HR WHEN >2   URINE CULTURE   BLOOD CULTURE   BLOOD CULTURE        CT Abdomen Pelvis w Contrast   Final Result   IMPRESSION:    1.  Likely chronically obstructing 6 mm in the distal right ureter with persistent moderate hydroureteronephrosis and perinephric/periureteric stranding. If this is the same stone that was seen on prior CT, it is slightly progressed since 2020.    Significant cortical thinning in the right kidney also suggests chronic nature of this obstruction.   2.  While degree of perinephric/periureteric stranding is similar to prior CT, recommend correlation with urinalysis to exclude superimposed urinary tract infection.   3.  Diffuse hepatic steatosis.             Treatments provided: labs, imaging, medications, monitoring.  Family Comments: updated by phone.  OBS brochure/video discussed/provided to patient:  No  ED Medications:   Medications   morphine (PF) injection 4 mg (4 mg Intravenous $Given 3/21/25 8829)   ondansetron (ZOFRAN) injection 4 mg (4 mg Intravenous $Given  3/21/25 1639)   cefTRIAXone (ROCEPHIN) 1 g vial to attach to  mL bag for ADULTS or NS 50 mL bag for PEDS (has no administration in time range)   sodium chloride 0.9% BOLUS 1,000 mL (has no administration in time range)   sodium chloride 0.9% BOLUS 1,000 mL (1,000 mLs Intravenous $New Bag 3/21/25 1638)   CT Scan Flush (65 mLs Intravenous $Given 3/21/25 1724)   iopamidol (ISOVUE-370) solution 500 mL (100 mLs Intravenous $Given 3/21/25 1724)   ketorolac (TORADOL) injection 15 mg (15 mg Intravenous $Given 3/21/25 1821)       Drips infusing:  No  For the majority of the shift this patient was Green.   Interventions performed were NA.    Sepsis treatment initiated: No    Cares/treatment/interventions/medications to be completed following ED care: Urology to place stent.    ED Nurse Name: Britt Duong RN  7:02 PM

## 2025-03-22 NOTE — CONSULTS
[facility] Consultation by [facility]    [unfilled] MRN# [id number]   Age: 44 year old YOB: 1980     Date of Admission:  3/21/2025    Reason for consult: Right ureteral stone and urinary tract infection               Level of consult: Consult, follow and place orders           Assessment and Plan:   Assessment:   Right ureteral stone and urinary tract infection      Plan:   I recommended that we proceed to the operating at this time for cystoscopy with right ureteral stent placement.  We discussed the procedure in detail along with its risks and expected recovery.  All of her questions were answered and she wishes to proceed.  She will be admitted to the hospital postoperatively.  Ultimately, after course of antibiotics she will need to return to the operating as outpatient for stone extraction and stent removal in the future.    Ruben Zacarias M.D.  [facility]  [phone]                 Chief Complaint:        History is obtained from the patient and EMR.         History of Present Illness:   This patient is a 44 year old female admitted with a right ureteral stone.  She has a history of multiple stones previously.  She has had previous ureteral stones treated in 2015 and 2020.  She says she has not felt any pains earlier today.  She began having right sided flank pain earlier today.  She has had no fevers chills nausea or vomiting.    She came to the emergency department the above complaints and had a likely urinary tract infection on urinalysis.  CT scan shows obstructing stone in the right kidney.  Her lactate is significantly elevated as well.    On exam she is currently alert and oriented and in no acute distress.    I personally viewed her CT scan images and there is hydronephrosis and hydroureter on the right side down to her distal right ureteral stone.  There is some thinning of the parenchyma of the right kidney as well.    Lactate significant elevated at 4.5.  The  urinalysis shows significant pyuria present.  Serum WBC is 16.  Urine culture and blood culture are pending.              Past Medical History:     Past Medical History:   Diagnosis Date    Abnormal Pap smear     colposcopy    Chronic kidney disease     Gastroesophageal reflux disease     Hypertension     Thyroid disease     on thyroid meds/hypothyroid             Past Surgical History:     Past Surgical History:   Procedure Laterality Date    COMBINED CYSTOSCOPY, INSERT STENT URETER(S) Right 3/24/2020    Procedure: Video cystoscopy, right double-J stent placement (5-Mexican x24 cm).;  Surgeon: Joseph Dangelo MD;  Location: [facility] OR    CYSTOSCOPY, RETROGRADES, INSERT STENT URETER(S), COMBINED Right 9/3/2015    Procedure: COMBINED CYSTOSCOPY, RETROGRADES, INSERT STENT URETER(S);  Surgeon: Joseph Dangelo MD;  Location: [facility] OR    DILATION AND CURETTAGE SUCTION N/A 5/7/2016    Procedure: DILATION AND CURETTAGE SUCTION;  Surgeon: Albert Hammer MD;  Location: [facility] OR    OPEN REDUCTION INTERNAL FIXATION FOOT Right 12/2/2019    Procedure: Open reduction internal fixation right LisFranc fracture, open reduction internal fixation right third metatarsal base fracture;  Surgeon: Monroe Skelton MD;  Location: [facility] OR             Social History:     Social History     Tobacco Use    Smoking status: Never    Smokeless tobacco: Never   Substance Use Topics    Alcohol use: No             Family History:   No family history on file.  Family history reviewed.             Allergies:   No Known Allergies          Medications:     Current Facility-Administered Medications   Medication Dose Route Frequency Provider Last Rate Last Admin    ceFAZolin Sodium (ANCEF) injection 2 g  2 g Intravenous Pre-Op/Pre-procedure x 1 dose Ruben Zacarias MD        ceFAZolin Sodium (ANCEF) injection 2 g  2 g Intravenous See Admin Instructions Ruben Zacarias MD        lactated ringers infusion   Intravenous Continuous  "Chery Estrada MD        lidocaine (LMX4) cream   Topical Q1H PRN Chery Estrada MD        lidocaine 1 % 0.1-1 mL  0.1-1 mL Other Q1H PRN Chery Estrada MD        [Auto Hold] morphine (PF) injection 4 mg  4 mg Intravenous Q15 Min PRPablo Romero MD   4 mg at 03/21/25 1845    [Auto Hold] ondansetron (ZOFRAN) injection 4 mg  4 mg Intravenous Q30 Min PRN Pablo Escalona MD   4 mg at 03/21/25 1639    sodium chloride (PF) 0.9% PF flush 3 mL  3 mL Intracatheter Q8H [facility] Chery Estrada MD        sodium chloride (PF) 0.9% PF flush 3 mL  3 mL Intracatheter q1 min prCheyr Brian MD                 Review of Systems:   A comprehensive 10-point review of systems was performed and found to be negative except as described in the HPI.     BP (!) 184/126   Pulse 114   Temp 98.4  F (36.9  C)   Resp 22   Ht 1.676 m (5' 6\")   SpO2 95%   BMI 38.74 kg/m    PSYCH: NAD  EYES: EOMI  MOUTH: MMM  NEURO: AAO x3            Data:     Lab Results   Component Value Date    WBC 16.0 (H) 03/21/2025    HGB 12.2 03/21/2025    HCT 36.4 03/21/2025    MCV 92 03/21/2025     03/21/2025     Lab Results   Component Value Date    CR 1.40 (H) 03/21/2025                  "

## 2025-03-23 PROBLEM — N17.0 ACUTE KIDNEY FAILURE WITH TUBULAR NECROSIS: Status: ACTIVE | Noted: 2025-03-23

## 2025-03-23 LAB
ANION GAP SERPL CALCULATED.3IONS-SCNC: 11 MMOL/L (ref 7–15)
BUN SERPL-MCNC: 21.2 MG/DL (ref 6–20)
CALCIUM SERPL-MCNC: 9.6 MG/DL (ref 8.8–10.4)
CHLORIDE SERPL-SCNC: 106 MMOL/L (ref 98–107)
CREAT SERPL-MCNC: 2.17 MG/DL (ref 0.51–0.95)
EGFRCR SERPLBLD CKD-EPI 2021: 28 ML/MIN/1.73M2
ERYTHROCYTE [DISTWIDTH] IN BLOOD BY AUTOMATED COUNT: 15.5 % (ref 10–15)
GLUCOSE SERPL-MCNC: 121 MG/DL (ref 70–99)
HCO3 SERPL-SCNC: 22 MMOL/L (ref 22–29)
HCT VFR BLD AUTO: 29.1 % (ref 35–47)
HGB BLD-MCNC: 9.2 G/DL (ref 11.7–15.7)
MCH RBC QN AUTO: 29.9 PG (ref 26.5–33)
MCHC RBC AUTO-ENTMCNC: 31.6 G/DL (ref 31.5–36.5)
MCV RBC AUTO: 95 FL (ref 78–100)
PLATELET # BLD AUTO: 64 10E3/UL (ref 150–450)
POTASSIUM SERPL-SCNC: 3.9 MMOL/L (ref 3.4–5.3)
RBC # BLD AUTO: 3.08 10E6/UL (ref 3.8–5.2)
SODIUM SERPL-SCNC: 139 MMOL/L (ref 135–145)
WBC # BLD AUTO: 16 10E3/UL (ref 4–11)

## 2025-03-23 PROCEDURE — 36415 COLL VENOUS BLD VENIPUNCTURE: CPT | Performed by: INTERNAL MEDICINE

## 2025-03-23 PROCEDURE — 250N000011 HC RX IP 250 OP 636: Performed by: NURSE PRACTITIONER

## 2025-03-23 PROCEDURE — 99232 SBSQ HOSP IP/OBS MODERATE 35: CPT | Performed by: INTERNAL MEDICINE

## 2025-03-23 PROCEDURE — 250N000013 HC RX MED GY IP 250 OP 250 PS 637: Performed by: NURSE PRACTITIONER

## 2025-03-23 PROCEDURE — 250N000013 HC RX MED GY IP 250 OP 250 PS 637: Performed by: INTERNAL MEDICINE

## 2025-03-23 PROCEDURE — 80048 BASIC METABOLIC PNL TOTAL CA: CPT | Performed by: INTERNAL MEDICINE

## 2025-03-23 PROCEDURE — 85027 COMPLETE CBC AUTOMATED: CPT | Performed by: INTERNAL MEDICINE

## 2025-03-23 PROCEDURE — 120N000013 HC R&B IMCU

## 2025-03-23 RX ORDER — OXYCODONE HYDROCHLORIDE 10 MG/1
10 TABLET ORAL EVERY 4 HOURS PRN
Status: DISCONTINUED | OUTPATIENT
Start: 2025-03-23 | End: 2025-03-24 | Stop reason: HOSPADM

## 2025-03-23 RX ORDER — OXYCODONE HYDROCHLORIDE 5 MG/1
5 TABLET ORAL EVERY 4 HOURS PRN
Status: DISCONTINUED | OUTPATIENT
Start: 2025-03-23 | End: 2025-03-24 | Stop reason: HOSPADM

## 2025-03-23 RX ADMIN — ACETAMINOPHINE, CAFFEINE 2 TABLET: 500; 65 TABLET, FILM COATED ORAL at 09:43

## 2025-03-23 RX ADMIN — PIPERACILLIN AND TAZOBACTAM 4.5 G: 4; .5 INJECTION, POWDER, FOR SOLUTION INTRAVENOUS at 23:51

## 2025-03-23 RX ADMIN — PIPERACILLIN AND TAZOBACTAM 4.5 G: 4; .5 INJECTION, POWDER, FOR SOLUTION INTRAVENOUS at 17:39

## 2025-03-23 RX ADMIN — PIPERACILLIN AND TAZOBACTAM 4.5 G: 4; .5 INJECTION, POWDER, FOR SOLUTION INTRAVENOUS at 00:28

## 2025-03-23 RX ADMIN — PANTOPRAZOLE SODIUM 40 MG: 40 TABLET, DELAYED RELEASE ORAL at 06:31

## 2025-03-23 RX ADMIN — PIPERACILLIN AND TAZOBACTAM 4.5 G: 4; .5 INJECTION, POWDER, FOR SOLUTION INTRAVENOUS at 12:00

## 2025-03-23 RX ADMIN — OXYCODONE HYDROCHLORIDE 10 MG: 10 TABLET ORAL at 20:00

## 2025-03-23 RX ADMIN — PIPERACILLIN AND TAZOBACTAM 4.5 G: 4; .5 INJECTION, POWDER, FOR SOLUTION INTRAVENOUS at 06:31

## 2025-03-23 RX ADMIN — ONDANSETRON 4 MG: 4 TABLET, ORALLY DISINTEGRATING ORAL at 15:54

## 2025-03-23 RX ADMIN — AMLODIPINE BESYLATE 5 MG: 5 TABLET ORAL at 08:49

## 2025-03-23 RX ADMIN — OXYBUTYNIN CHLORIDE 15 MG: 5 TABLET, EXTENDED RELEASE ORAL at 08:49

## 2025-03-23 RX ADMIN — LEVOTHYROXINE SODIUM 250 MCG: 0.12 TABLET ORAL at 08:49

## 2025-03-23 RX ADMIN — ACETAMINOPHEN 650 MG: 325 TABLET, FILM COATED ORAL at 06:31

## 2025-03-23 RX ADMIN — OXYCODONE HYDROCHLORIDE 5 MG: 5 TABLET ORAL at 15:54

## 2025-03-23 RX ADMIN — ACETAMINOPHEN 650 MG: 325 TABLET, FILM COATED ORAL at 13:14

## 2025-03-23 RX ADMIN — METOPROLOL SUCCINATE 100 MG: 100 TABLET, EXTENDED RELEASE ORAL at 08:49

## 2025-03-23 ASSESSMENT — ACTIVITIES OF DAILY LIVING (ADL)
ADLS_ACUITY_SCORE: 29
ADLS_ACUITY_SCORE: 29
ADLS_ACUITY_SCORE: 34
ADLS_ACUITY_SCORE: 29
ADLS_ACUITY_SCORE: 28
ADLS_ACUITY_SCORE: 28
ADLS_ACUITY_SCORE: 34
ADLS_ACUITY_SCORE: 28
ADLS_ACUITY_SCORE: 34
ADLS_ACUITY_SCORE: 28
ADLS_ACUITY_SCORE: 29
ADLS_ACUITY_SCORE: 28
ADLS_ACUITY_SCORE: 28
ADLS_ACUITY_SCORE: 34
ADLS_ACUITY_SCORE: 34
ADLS_ACUITY_SCORE: 28

## 2025-03-23 NOTE — ANESTHESIA POSTPROCEDURE EVALUATION
Patient: Enoch Barrios    Procedure: Procedure(s):  CYSTOSCOPY, WITH URETERAL RIGHT STENT INSERTION, RETROGRADES, PYELOGRAM       Anesthesia Type:  General    Note:  Disposition: Inpatient   Postop Pain Control: Uneventful            Sign Out: Well controlled pain   PONV: No   Neuro/Psych: Uneventful            Sign Out: Acceptable/Baseline neuro status   Airway/Respiratory: Uneventful            Sign Out: Acceptable/Baseline resp. status   CV/Hemodynamics: Uneventful            Sign Out: Acceptable CV status; No obvious hypovolemia; No obvious fluid overload   Other NRE:    DID A NON-ROUTINE EVENT OCCUR? No           Last vitals:  Vitals Value Taken Time   /93 03/21/25 2200   Temp 99.32  F (37.4  C) 03/21/25 2204   Pulse 108 03/21/25 2204   Resp 18 03/21/25 2204   SpO2 96 % 03/21/25 2204   Vitals shown include unfiled device data.    Electronically Signed By: Chery Estrada MD  March 23, 2025  7:05 AM

## 2025-03-23 NOTE — PROGRESS NOTES
[facility]    Medicine Progress Note - Hospitalist Service    Date of Admission:  3/21/2025    Assessment & Plan   Enoch Barrios is a 44 year old female admitted on 3/21/2025 with obstructive ureterolithiasis and was taken to the OR for stenting.      Ms. Barrios had come to attention with abrupt onset right flank pain and was noted to have a UTI. She had not reported fevers, but UA was strongly consistent with UTI, WBC 16, CT showed perinephric stranding and the LA was elevated.      Following the procedure, the pt's lactate had continued to rise despite vigorous fluid resuscitation. Ultimately, his blood cultures have grown E coli (sens pending).     DX:  Septic shock (elevated WBC, tachycardia, fluid-responsive septic shock, elevated lactic acid) due to E coli bacteremia.  Verigene testing was negative for identified resistance genes, but fluoroquinolones are ineffective.  Shock resolved after fluid resuscitation and treatment of the underlying infection.  Obstructive ureterolithiasis.  POD #2 s/p right ureteral stenting.   Acute on chronic CKD stage 3a (suspected). Baseline not completely clear. Acute kidney injury is slightly worse today.   HTN.   Elevated glucose.with HbA1c 5.2.  Fatty liver without evidence of hepatitis.  Plan:   Plan to switch to Ceftriaxone 2 grams IV.  Cont to hold Losartan. Will not add further IVF as pt is clinically hypervolemic.  Encourage oral intake. OK to stop IVF.           Diet: Regular Diet Adult    DVT Prophylaxis: Pneumatic Compression Devices  Erazo Catheter: Not present  Lines: None     Cardiac Monitoring: None  Code Status: Full Code      Clinically Significant Risk Factors                 # Thrombocytopenia: Lowest platelets = 64 in last 2 days, will monitor for bleeding  # Acute Kidney Injury, unspecified: based on a >150% or 0.3 mg/dL increase in last creatinine compared to past 90 day average, will monitor renal function             # Severe  "Obesity: Estimated body mass index is 40.07 kg/m  as calculated from the following:    Height as of this encounter: 1.676 m (5' 6\").    Weight as of this encounter: 112.6 kg (248 lb 3.8 oz)., PRESENT ON ADMISSION            Social Drivers of Health            Disposition Plan     Medically Ready for Discharge: Anticipated in 2-4 Days       Yovani Hand MD  Hospitalist Service  [facility]  Securely message with [employer] (more info)  Text page via [employer] Paging/Directory   ______________________________________________________________________    Interval History   Stable night per nursing. Creat again slightly higher, but the rate of rise has slowed.     Physical Exam   Vital Signs: Temp: 97.8  F (36.6  C) Temp src: Oral BP: 136/88 Pulse: 67   Resp: 20 SpO2: 94 % O2 Device: None (Room air)    Weight: 248 lbs 3.81 oz    General Appearance: Alert and coherent in NAD.   Respiratory: CTA  Cardiovascular: RRR without murmur  GI: soft, NTND  Skin: no lesions.  Other: No pitting edema     Medical Decision Making       40 MINUTES SPENT BY ME on the date of service doing chart review, history, exam, documentation & further activities per the note.      Data   Results for orders placed or performed during the hospital encounter of 03/21/25 (from the past 24 hours)   Basic metabolic panel   Result Value Ref Range    Sodium 139 135 - 145 mmol/L    Potassium 3.9 3.4 - 5.3 mmol/L    Chloride 106 98 - 107 mmol/L    Carbon Dioxide (CO2) 22 22 - 29 mmol/L    Anion Gap 11 7 - 15 mmol/L    Urea Nitrogen 21.2 (H) 6.0 - 20.0 mg/dL    Creatinine 2.17 (H) 0.51 - 0.95 mg/dL    GFR Estimate 28 (L) >60 mL/min/1.73m2    Calcium 9.6 8.8 - 10.4 mg/dL    Glucose 121 (H) 70 - 99 mg/dL   CBC with platelets   Result Value Ref Range    WBC Count 16.0 (H) 4.0 - 11.0 10e3/uL    RBC Count 3.08 (L) 3.80 - 5.20 10e6/uL    Hemoglobin 9.2 (L) 11.7 - 15.7 g/dL    Hematocrit 29.1 (L) 35.0 - 47.0 %    MCV 95 78 - 100 fL    MCH 29.9 26.5 - " 33.0 pg    MCHC 31.6 31.5 - 36.5 g/dL    RDW 15.5 (H) 10.0 - 15.0 %    Platelet Count 64 (L) 150 - 450 10e3/uL

## 2025-03-23 NOTE — PLAN OF CARE
"End of Shift Summary  For vital signs and complete assessments, please see documentation flowsheets.     Pertinent assessments:   Pt A&Ox4. Up ind. Pain well managed w/ tylenol and oxy. Mild nausea this afternoon, zofran given w/ relief. Voiding spontaneously, dysuria better today.     Major Shift Events: none    Treatment Plan: IVF, IV zosyn, pain management    Problem: Adult Inpatient Plan of Care  Goal: Plan of Care Review  Description: The Plan of Care Review/Shift note should be completed every shift.  The Outcome Evaluation is a brief statement about your assessment that the patient is improving, declining, or no change.  This information will be displayed automatically on your shiftnote.  Outcome: Progressing  Flowsheets (Taken 3/23/2025 1751)  Outcome Evaluation: Moving ind in room. Voiding spontaneously. R side pain managed w/ tylenol and oxycodone.  Plan of Care Reviewed With: patient  Overall Patient Progress: improving  Goal: Patient-Specific Goal (Individualized)  Description: You can add care plan individualizations to a care plan. Examples of Individualization might be:  \"Parent requests to be called daily at 9am for status\", \"I have a hard time hearing out of my right ear\", or \"Do not touch me to wake me up as it startlesme\".  Outcome: Progressing  Goal: Absence of Hospital-Acquired Illness or Injury  Outcome: Progressing  Intervention: Identify and Manage Fall Risk  Recent Flowsheet Documentation  Taken 3/23/2025 0848 by Leonela Nelson RN  Safety Promotion/Fall Prevention:   treat underlying cause   nonskid shoes/slippers when out of bed   patient and family education   mobility aid in reach   lighting adjusted   clutter free environment maintained   increased rounding and observation  Intervention: Prevent Skin Injury  Recent Flowsheet Documentation  Taken 3/23/2025 0848 by Leonela Nelson RN  Body Position: position changed independently  Goal: Optimal Comfort and Wellbeing  Outcome: " Progressing  Intervention: Monitor Pain and Promote Comfort  Recent Flowsheet Documentation  Taken 3/23/2025 0848 by Leonela Nelson RN  Pain Management Interventions:   medication (see MAR)   heat applied   rest  Goal: Readiness for Transition of Care  Outcome: Progressing     Problem: Comorbidity Management  Goal: Blood Pressure in Desired Range  Outcome: Progressing  Intervention: Maintain Blood Pressure Management  Recent Flowsheet Documentation  Taken 3/23/2025 0848 by Leonela Nelson RN  Medication Review/Management: medications reviewed     Problem: Infection  Goal: Absence of Infection Signs and Symptoms  Outcome: Progressing     Problem: Pain Acute  Goal: Optimal Pain Control and Function  Outcome: Progressing  Intervention: Develop Pain Management Plan  Recent Flowsheet Documentation  Taken 3/23/2025 0848 by Leonela Nelson RN  Pain Management Interventions:   medication (see MAR)   heat applied   rest  Intervention: Prevent or Manage Pain  Recent Flowsheet Documentation  Taken 3/23/2025 0848 by Leonela Nelson RN  Medication Review/Management: medications reviewed   Goal Outcome Evaluation:      Plan of Care Reviewed With: patient    Overall Patient Progress: improvingOverall Patient Progress: improving    Outcome Evaluation: Moving ind in room. Voiding spontaneously. R side pain managed w/ tylenol and oxycodone.

## 2025-03-23 NOTE — PLAN OF CARE
"Pertinent assessments: A&Ox4, VSS on RA, up SBA. C/o moderate abd pain, prn dilaudid x1 & heartburn, prn tums x1. Denies nausea or sob. Voiding spontaneously, dysuria, pink tinged urine. PIV SL. Tylenol this AM for headache.    Major Shift Events: none    Treatment Plan: IVF, IV zosyn, pain management    Bedside Nurse: Araseli Erickson RN    Problem: Adult Inpatient Plan of Care  Goal: Plan of Care Review  Description: The Plan of Care Review/Shift note should be completed every shift.  The Outcome Evaluation is a brief statement about your assessment that the patient is improving, declining, or no change.  This information will be displayed automatically on your shiftnote.  Outcome: Progressing  Flowsheets (Taken 3/23/2025 0640)  Outcome Evaluation: SBA to bathroom, voiding spontaneously, pink tinged urine, IV abx  Plan of Care Reviewed With: patient  Overall Patient Progress: improving  Goal: Patient-Specific Goal (Individualized)  Description: You can add care plan individualizations to a care plan. Examples of Individualization might be:  \"Parent requests to be called daily at 9am for status\", \"I have a hard time hearing out of my right ear\", or \"Do not touch me to wake me up as it startlesme\".  Outcome: Progressing  Goal: Absence of Hospital-Acquired Illness or Injury  Outcome: Progressing  Intervention: Identify and Manage Fall Risk  Recent Flowsheet Documentation  Taken 3/22/2025 2013 by Araseli Erickson RN  Safety Promotion/Fall Prevention:   activity supervised   clutter free environment maintained   increased rounding and observation   lighting adjusted   nonskid shoes/slippers when out of bed   patient and family education   room near nurse's station  Intervention: Prevent Skin Injury  Recent Flowsheet Documentation  Taken 3/22/2025 2013 by Araseli Erickson RN  Body Position: position changed independently  Intervention: Prevent and Manage VTE (Venous Thromboembolism) Risk  Recent Flowsheet Documentation  Taken " 3/22/2025 2013 by Araseli Erickson, RN  VTE Prevention/Management: SCDs off (sequential compression devices)  Intervention: Prevent Infection  Recent Flowsheet Documentation  Taken 3/22/2025 2013 by Araseli Erickson, RN  Infection Prevention:   cohorting utilized   rest/sleep promoted   single patient room provided  Goal: Optimal Comfort and Wellbeing  Outcome: Progressing  Intervention: Monitor Pain and Promote Comfort  Recent Flowsheet Documentation  Taken 3/22/2025 2013 by Araseli Erickson, RN  Pain Management Interventions:   heat applied   repositioned   rest  Goal: Readiness for Transition of Care  Outcome: Progressing   Goal Outcome Evaluation:      Plan of Care Reviewed With: patient    Overall Patient Progress: improvingOverall Patient Progress: improving    Outcome Evaluation: SBA to bathroom, voiding spontaneously, pink tinged urine, IV abx

## 2025-03-23 NOTE — PLAN OF CARE
"End of Shift Summary  For vital signs and complete assessments, please see documentation flowsheets.     Pertinent assessments:   Pt A&Ox4. Up SBA. Pain well managed w/ tylenol and dilaudid. Denies nausea, SOB. Afebrile. Dysuria. Pink tinged urine, voiding spontaneously.     Major Shift Events:     Treatment Plan: IVF, IV zithro, rocephin.       Problem: Adult Inpatient Plan of Care  Goal: Plan of Care Review  Description: The Plan of Care Review/Shift note should be completed every shift.  The Outcome Evaluation is a brief statement about your assessment that the patient is improving, declining, or no change.  This information will be displayed automatically on your shiftnote.  Outcome: Progressing  Flowsheets (Taken 3/22/2025 1916)  Outcome Evaluation: pt up SBA, pain well managed w/ tylenol and dilaudid.  Plan of Care Reviewed With: patient  Overall Patient Progress: improving  Goal: Patient-Specific Goal (Individualized)  Description: You can add care plan individualizations to a care plan. Examples of Individualization might be:  \"Parent requests to be called daily at 9am for status\", \"I have a hard time hearing out of my right ear\", or \"Do not touch me to wake me up as it startlesme\".  Outcome: Progressing  Goal: Absence of Hospital-Acquired Illness or Injury  Outcome: Progressing  Goal: Optimal Comfort and Wellbeing  Outcome: Progressing  Goal: Readiness for Transition of Care  Outcome: Progressing     Problem: Comorbidity Management  Goal: Blood Pressure in Desired Range  Outcome: Progressing     Problem: Infection  Goal: Absence of Infection Signs and Symptoms  Outcome: Progressing     Problem: Pain Acute  Goal: Optimal Pain Control and Function  Outcome: Progressing   Goal Outcome Evaluation:      Plan of Care Reviewed With: patient    Overall Patient Progress: improvingOverall Patient Progress: improving    Outcome Evaluation: pt up SBA, pain well managed w/ tylenol and dilaudid.      "

## 2025-03-24 VITALS
BODY MASS INDEX: 39.9 KG/M2 | HEIGHT: 66 IN | DIASTOLIC BLOOD PRESSURE: 97 MMHG | HEART RATE: 97 BPM | SYSTOLIC BLOOD PRESSURE: 154 MMHG | OXYGEN SATURATION: 94 % | TEMPERATURE: 98.2 F | RESPIRATION RATE: 18 BRPM | WEIGHT: 248.24 LBS

## 2025-03-24 PROBLEM — R65.21 SEPTIC SHOCK DUE TO ESCHERICHIA COLI (H): Status: ACTIVE | Noted: 2025-03-24

## 2025-03-24 PROBLEM — R73.9 HYPERGLYCEMIA: Status: ACTIVE | Noted: 2025-03-24

## 2025-03-24 PROBLEM — I10 PRIMARY HYPERTENSION: Status: ACTIVE | Noted: 2025-03-24

## 2025-03-24 PROBLEM — N17.9 AKI (ACUTE KIDNEY INJURY): Status: ACTIVE | Noted: 2025-03-23

## 2025-03-24 PROBLEM — N12 PYELONEPHRITIS: Status: ACTIVE | Noted: 2025-03-24

## 2025-03-24 PROBLEM — A41.51 SEPTIC SHOCK DUE TO ESCHERICHIA COLI (H): Status: ACTIVE | Noted: 2025-03-24

## 2025-03-24 LAB
ANION GAP SERPL CALCULATED.3IONS-SCNC: 13 MMOL/L (ref 7–15)
BACTERIA BLD CULT: ABNORMAL
BUN SERPL-MCNC: 18 MG/DL (ref 6–20)
CALCIUM SERPL-MCNC: 8.9 MG/DL (ref 8.8–10.4)
CHLORIDE SERPL-SCNC: 103 MMOL/L (ref 98–107)
CREAT SERPL-MCNC: 1.96 MG/DL (ref 0.51–0.95)
EGFRCR SERPLBLD CKD-EPI 2021: 31 ML/MIN/1.73M2
ERYTHROCYTE [DISTWIDTH] IN BLOOD BY AUTOMATED COUNT: 14.9 % (ref 10–15)
GLUCOSE SERPL-MCNC: 142 MG/DL (ref 70–99)
HCO3 SERPL-SCNC: 22 MMOL/L (ref 22–29)
HCT VFR BLD AUTO: 30.8 % (ref 35–47)
HGB BLD-MCNC: 9.9 G/DL (ref 11.7–15.7)
MCH RBC QN AUTO: 30.4 PG (ref 26.5–33)
MCHC RBC AUTO-ENTMCNC: 32.1 G/DL (ref 31.5–36.5)
MCV RBC AUTO: 95 FL (ref 78–100)
PLATELET # BLD AUTO: 71 10E3/UL (ref 150–450)
POTASSIUM SERPL-SCNC: 4 MMOL/L (ref 3.4–5.3)
RBC # BLD AUTO: 3.26 10E6/UL (ref 3.8–5.2)
SODIUM SERPL-SCNC: 138 MMOL/L (ref 135–145)
WBC # BLD AUTO: 15.2 10E3/UL (ref 4–11)

## 2025-03-24 PROCEDURE — 99238 HOSP IP/OBS DSCHRG MGMT 30/<: CPT | Performed by: INTERNAL MEDICINE

## 2025-03-24 PROCEDURE — 250N000013 HC RX MED GY IP 250 OP 250 PS 637: Performed by: INTERNAL MEDICINE

## 2025-03-24 PROCEDURE — 36415 COLL VENOUS BLD VENIPUNCTURE: CPT | Performed by: INTERNAL MEDICINE

## 2025-03-24 PROCEDURE — 99231 SBSQ HOSP IP/OBS SF/LOW 25: CPT | Performed by: PHYSICIAN ASSISTANT

## 2025-03-24 PROCEDURE — 250N000013 HC RX MED GY IP 250 OP 250 PS 637: Performed by: NURSE PRACTITIONER

## 2025-03-24 PROCEDURE — 250N000013 HC RX MED GY IP 250 OP 250 PS 637: Performed by: PHYSICIAN ASSISTANT

## 2025-03-24 PROCEDURE — 80048 BASIC METABOLIC PNL TOTAL CA: CPT | Performed by: INTERNAL MEDICINE

## 2025-03-24 PROCEDURE — 85027 COMPLETE CBC AUTOMATED: CPT | Performed by: INTERNAL MEDICINE

## 2025-03-24 PROCEDURE — 250N000011 HC RX IP 250 OP 636: Performed by: NURSE PRACTITIONER

## 2025-03-24 RX ORDER — SULFAMETHOXAZOLE AND TRIMETHOPRIM 800; 160 MG/1; MG/1
1 TABLET ORAL 2 TIMES DAILY
Qty: 14 TABLET | Refills: 0 | Status: SHIPPED | OUTPATIENT
Start: 2025-03-24 | End: 2025-03-31

## 2025-03-24 RX ORDER — ONDANSETRON 4 MG/1
4 TABLET, ORALLY DISINTEGRATING ORAL EVERY 6 HOURS PRN
Qty: 15 TABLET | Refills: 0 | Status: SHIPPED | OUTPATIENT
Start: 2025-03-24

## 2025-03-24 RX ORDER — CIPROFLOXACIN 500 MG/1
500 TABLET, FILM COATED ORAL 2 TIMES DAILY
Qty: 20 TABLET | Refills: 0 | Status: SHIPPED | OUTPATIENT
Start: 2025-03-24 | End: 2025-03-24

## 2025-03-24 RX ORDER — MIRABEGRON 25 MG/1
25 TABLET, FILM COATED, EXTENDED RELEASE ORAL DAILY
Status: DISCONTINUED | OUTPATIENT
Start: 2025-03-24 | End: 2025-03-24 | Stop reason: HOSPADM

## 2025-03-24 RX ORDER — OXYCODONE HYDROCHLORIDE 5 MG/1
5-10 TABLET ORAL EVERY 6 HOURS PRN
Qty: 24 TABLET | Refills: 0 | Status: SHIPPED | OUTPATIENT
Start: 2025-03-24 | End: 2025-03-27

## 2025-03-24 RX ORDER — CEFIXIME 400 MG/1
400 CAPSULE ORAL DAILY
Qty: 10 CAPSULE | Refills: 0 | Status: SHIPPED | OUTPATIENT
Start: 2025-03-24 | End: 2025-03-24

## 2025-03-24 RX ORDER — LOSARTAN POTASSIUM 100 MG/1
100 TABLET ORAL DAILY
Status: SHIPPED
Start: 2025-03-24

## 2025-03-24 RX ORDER — FLUCONAZOLE 150 MG/1
150 TABLET ORAL
Qty: 1 TABLET | Refills: 0 | Status: SHIPPED | OUTPATIENT
Start: 2025-03-24 | End: 2025-03-25

## 2025-03-24 RX ORDER — TAMSULOSIN HYDROCHLORIDE 0.4 MG/1
0.4 CAPSULE ORAL DAILY
Qty: 30 CAPSULE | Refills: 0 | Status: SHIPPED | OUTPATIENT
Start: 2025-03-25

## 2025-03-24 RX ORDER — SULFAMETHOXAZOLE AND TRIMETHOPRIM 800; 160 MG/1; MG/1
1 TABLET ORAL 2 TIMES DAILY
Qty: 14 TABLET | Refills: 0 | Status: SHIPPED | OUTPATIENT
Start: 2025-03-24 | End: 2025-03-24

## 2025-03-24 RX ORDER — MIRABEGRON 25 MG/1
25 TABLET, FILM COATED, EXTENDED RELEASE ORAL DAILY
Qty: 30 TABLET | Refills: 0 | Status: SHIPPED | OUTPATIENT
Start: 2025-03-24 | End: 2025-03-24

## 2025-03-24 RX ORDER — OXYBUTYNIN CHLORIDE 15 MG/1
15 TABLET, EXTENDED RELEASE ORAL DAILY
Qty: 30 TABLET | Refills: 0 | Status: SHIPPED | OUTPATIENT
Start: 2025-03-24

## 2025-03-24 RX ORDER — CEFPODOXIME PROXETIL 200 MG/1
200 TABLET, FILM COATED ORAL 2 TIMES DAILY
Qty: 14 TABLET | Refills: 0 | Status: SHIPPED | OUTPATIENT
Start: 2025-03-24 | End: 2025-03-24

## 2025-03-24 RX ORDER — TAMSULOSIN HYDROCHLORIDE 0.4 MG/1
0.4 CAPSULE ORAL DAILY
Status: DISCONTINUED | OUTPATIENT
Start: 2025-03-24 | End: 2025-03-24 | Stop reason: HOSPADM

## 2025-03-24 RX ADMIN — MIRABEGRON 25 MG: 25 TABLET, FILM COATED, EXTENDED RELEASE ORAL at 11:43

## 2025-03-24 RX ADMIN — LEVOTHYROXINE SODIUM 400 MCG: 150 TABLET ORAL at 10:09

## 2025-03-24 RX ADMIN — AMLODIPINE BESYLATE 5 MG: 5 TABLET ORAL at 10:09

## 2025-03-24 RX ADMIN — PIPERACILLIN AND TAZOBACTAM 4.5 G: 4; .5 INJECTION, POWDER, FOR SOLUTION INTRAVENOUS at 11:43

## 2025-03-24 RX ADMIN — PIPERACILLIN AND TAZOBACTAM 4.5 G: 4; .5 INJECTION, POWDER, FOR SOLUTION INTRAVENOUS at 05:21

## 2025-03-24 RX ADMIN — PANTOPRAZOLE SODIUM 40 MG: 40 TABLET, DELAYED RELEASE ORAL at 06:32

## 2025-03-24 RX ADMIN — TAMSULOSIN HYDROCHLORIDE 0.4 MG: 0.4 CAPSULE ORAL at 10:09

## 2025-03-24 RX ADMIN — METOPROLOL SUCCINATE 100 MG: 100 TABLET, EXTENDED RELEASE ORAL at 10:09

## 2025-03-24 RX ADMIN — ACETAMINOPHEN 650 MG: 325 TABLET, FILM COATED ORAL at 10:12

## 2025-03-24 RX ADMIN — OXYBUTYNIN CHLORIDE 15 MG: 5 TABLET, EXTENDED RELEASE ORAL at 10:09

## 2025-03-24 ASSESSMENT — ACTIVITIES OF DAILY LIVING (ADL)
ADLS_ACUITY_SCORE: 34
ADLS_ACUITY_SCORE: 34
ADLS_ACUITY_SCORE: 38
ADLS_ACUITY_SCORE: 34
ADLS_ACUITY_SCORE: 38
ADLS_ACUITY_SCORE: 34
ADLS_ACUITY_SCORE: 38
ADLS_ACUITY_SCORE: 34
ADLS_ACUITY_SCORE: 38

## 2025-03-24 NOTE — PROGRESS NOTES
[facility] Urology Progress Note          Assessment and Plan:     Assessment:      POD 3 Cystoscopy, right retrograde pyelogram, interpretation of fluoroscopic images. Right ureteral stent placement     Right ureteral stone    ALYSSIA (acute kidney injury)     Primary hypertension    Septic shock due to Escherichia coli (H)    Hyperglycemia    Pyelonephritis      Plan:   -Continue with indwelling ureteral stent.  -Will need definitive stone management in several weeks after treatment of infection to include cystoscopy, right-sided ureteroscopy with laser lithotripsy and basketing, and right ureteral stent exchange.  Our office will coordinate, and orders have already been placed.  -Possible side effects with an indwelling ureteral stent such as urgency and frequency of urination, dysuria, hematuria, symptoms of urine reflux, and some achiness in the side. Indwelling ureteral stents need to be exchanged every three months or removed by three months.    -Continue with IV antibiotics while inpatient.  Patient will need at least 14 days of antibiotics total for pyelonephritis, and will need to factor in positive blood cultures for total duration of antibiotics.  -Continue to monitor kidney function and leukocytosis.  Both are slowly improving.  -Pain and nausea management per primary service.  -Continue oxybutynin 15 mg extended release.  -Have added on Myrbetriq 25 mg once daily to help with urgency and frequency.  -Have also added on Flomax 0.4 mg once daily to see if this would help with flank discomfort from ureteral stent.  -Discussed that when cleared by medicine, okay to discharge on antibiotics and stent management medication from urology standpoint.  Suspect that this will be in 1 to 2 days based upon persistent leukocytosis and ALYSSIA, as well as positive blood cultures.    Liv Tristan PA-C   [facility]  [phone]               Interval History:     Doing okay.  Dysuria is improving,  but still having quite a bit of urgency and frequency.  On IV Zosyn.  Urine culture and blood cultures both show E. coli.  Tmax of 99.  No tachycardia.  Patient endorses nausea but denies fevers or chills.  Still having a fair amount of right-sided flank pain.  WBC 7.2 (16.0 (22.1)).  Creatinine 1.96 EGFR 31 (2.17 EGFR 28).              Review of Systems:     The 5 point Review of Systems is negative other than noted in the HPI             Medications:     Current Facility-Administered Medications   Medication Dose Route Frequency Provider Last Rate Last Admin    acetaminophen (TYLENOL) tablet 650 mg  650 mg Oral Q4H PRN Gresetshanita, Asad, APRN CNP   650 mg at 03/24/25 1012    Or    acetaminophen (TYLENOL) Suppository 650 mg  650 mg Rectal Q4H PRN Gresetshanita, Asad, APRN CNP        acetaminophen-caffeine (EXCEDRIN TENSION HEADACHE) 500-65 MG tablet 2 tablet  2 tablet Oral Q6H PRN Yovani Hand MD   2 tablet at 03/23/25 0943    amLODIPine (NORVASC) tablet 5 mg  5 mg Oral Daily Yovani Hand MD   5 mg at 03/24/25 1009    calcium carbonate (TUMS) chewable tablet 1,000 mg  1,000 mg Oral 4x Daily PRN Sethsetshanita, Asad, APRN CNP   1,000 mg at 03/22/25 2144    HYDROmorphone (DILAUDID) injection 0.2 mg  0.2 mg Intravenous Q2H PRN Sethseth, Asad, APRN CNP   0.2 mg at 03/22/25 1849    HYDROmorphone (DILAUDID) injection 0.4 mg  0.4 mg Intravenous Q2H PRN Sethsetshanita, Asad, APRN CNP   0.4 mg at 03/22/25 2054    levothyroxine (SYNTHROID/LEVOTHROID) tablet 400 mcg  400 mcg Oral Daily Yovani Hand MD   400 mcg at 03/24/25 1009    lidocaine (LMX4) cream   Topical Q1H PRN Greseth, Asad, APRN CNP        lidocaine 1 % 0.1-1 mL  0.1-1 mL Other Q1H PRN Greseth, Asad, APRN CNP        [Held by provider] losartan (COZAAR) tablet 100 mg  100 mg Oral Daily Yovani Hand MD   100 mg at 03/22/25 1128    metoprolol succinate ER (TOPROL XL) 24 hr tablet 100 mg  100 mg Oral Daily Yovani Hand MD   100 mg at 03/24/25 1009     mirabegron (MYRBETRIQ) 24 hr tablet 25 mg  25 mg Oral Daily Liv Tristan PA-C        naloxone (NARCAN) injection 0.2 mg  0.2 mg Intravenous Q2 Min PRN Greseth, Asad, APRN CNP        Or    naloxone (NARCAN) injection 0.4 mg  0.4 mg Intravenous Q2 Min PRN Greseth, Asad, APRN CNP        Or    naloxone (NARCAN) injection 0.2 mg  0.2 mg Intramuscular Q2 Min PRN Greseth, Asad, APRN CNP        Or    naloxone (NARCAN) injection 0.4 mg  0.4 mg Intramuscular Q2 Min PRN Greseth, Asad, APRN CNP        ondansetron (ZOFRAN ODT) ODT tab 4 mg  4 mg Oral Q6H PRN Greseth, Asad, APRN CNP   4 mg at 03/23/25 1554    Or    ondansetron (ZOFRAN) injection 4 mg  4 mg Intravenous Q6H PRN Greseth, Asad, APRN CNP        oxyBUTYnin ER (DITROPAN XL) 24 hr tablet 15 mg  15 mg Oral Daily Yovani Hand MD   15 mg at 03/24/25 1009    oxyCODONE (ROXICODONE) tablet 5 mg  5 mg Oral Q4H PRN Yovani Hand MD   5 mg at 03/23/25 1554    oxyCODONE IR (ROXICODONE) tablet 10 mg  10 mg Oral Q4H PRN Yovani Hand MD   10 mg at 03/23/25 2000    pantoprazole (PROTONIX) EC tablet 40 mg  40 mg Oral QAM AC Asad Smith APRN CNP   40 mg at 03/24/25 0632    piperacillin-tazobactam (ZOSYN) 4.5 g vial to attach to  mL bag  4.5 g Intravenous Q6H SethsetAsad diehl APRN CNP   4.5 g at 03/24/25 0521    senna-docusate (SENOKOT-S/PERICOLACE) 8.6-50 MG per tablet 1 tablet  1 tablet Oral BID PRN SethsetMaurice diehlAsad, APRN CNP   1 tablet at 03/22/25 1313    Or    senna-docusate (SENOKOT-S/PERICOLACE) 8.6-50 MG per tablet 2 tablet  2 tablet Oral BID PRN Maurice Smithriel, APRN CNP        sodium chloride (PF) 0.9% PF flush 3 mL  3 mL Intracatheter Q8H [facility] Maurice Smithriel, APRN CNP   3 mL at 03/21/25 2247    sodium chloride (PF) 0.9% PF flush 3 mL  3 mL Intracatheter q1 min prn Asad Smith, APRN CNP        sodium chloride (PF) 0.9% PF flush 3 mL  3 mL Intracatheter Q8H [facility] Ruben Zacarias MD   3 mL at 03/24/25 0521    sodium  "chloride (PF) 0.9% PF flush 3 mL  3 mL Intracatheter q1 min prn Ruben Zacarias MD        tamsulosin (FLOMAX) capsule 0.4 mg  0.4 mg Oral Daily Liv Tristan PA-C   0.4 mg at 03/24/25 1009                  Physical Exam:   Vitals were reviewed  Patient Vitals for the past 8 hrs:   BP Temp Temp src Pulse Resp SpO2   03/24/25 0743 (!) 154/97 98.2  F (36.8  C) Oral 97 18 94 %     GEN: NAD, lying in bed  EYES: EOMI  MOUTH: MMM  NECK: Supple  RESP: Unlabored breathing  NEURO: AAO  URO: Urinating on own with decreasing dysuria           Data:   No results found for: \"NTBNPI\", \"NTBNP\"  Lab Results   Component Value Date    WBC 15.2 (H) 03/24/2025    WBC 16.0 (H) 03/23/2025    WBC 22.1 (H) 03/22/2025    HGB 9.9 (L) 03/24/2025    HGB 9.2 (L) 03/23/2025    HGB 9.8 (L) 03/22/2025    HCT 30.8 (L) 03/24/2025    HCT 29.1 (L) 03/23/2025    HCT 29.6 (L) 03/22/2025    MCV 95 03/24/2025    MCV 95 03/23/2025    MCV 93 03/22/2025    PLT 71 (L) 03/24/2025    PLT 64 (L) 03/23/2025    PLT 93 (L) 03/22/2025     Lab Results   Component Value Date    INR 0.92 05/07/2016    INR 0.95 09/03/2015      All cultures:  Recent Labs   Lab 03/21/25  1936 03/21/25  1641   CULTURE Positive on the 1st day of incubation*  Escherichia coli*  Positive on the 1st day of incubation*  Escherichia coli* >100,000 CFU/mL Escherichia coli*      "

## 2025-03-24 NOTE — PROGRESS NOTES
Pt d/c'd home. Discharge instructions given & verbalized understanding. Discharge meds sent with pt & to pt's pharmacy.

## 2025-03-24 NOTE — PLAN OF CARE
"Pertinent assessments: A&Ox4, elevated pressures otherwise VSS on RA. C/o moderate abd pain, prn oxy x1. Independent in room. Voiding spontaneously, denies dysuria, clear yellow urine w/ adequate output. PIV SL.     Major Shift Events: none    Treatment Plan: IVF, IV zosyn, pain management    Bedside Nurse: Araseli Erickson RN    Problem: Adult Inpatient Plan of Care  Goal: Plan of Care Review  Description: The Plan of Care Review/Shift note should be completed every shift.  The Outcome Evaluation is a brief statement about your assessment that the patient is improving, declining, or no change.  This information will be displayed automatically on your shiftnote.  Outcome: Progressing  Flowsheets (Taken 3/24/2025 0532)  Outcome Evaluation: Ind in room, voiding spontaneously-adequate amounts, clear yellow urine, no dysuria, pain controlled w/ tylenol & oxy  Plan of Care Reviewed With: patient  Overall Patient Progress: improving  Goal: Patient-Specific Goal (Individualized)  Description: You can add care plan individualizations to a care plan. Examples of Individualization might be:  \"Parent requests to be called daily at 9am for status\", \"I have a hard time hearing out of my right ear\", or \"Do not touch me to wake me up as it startlesme\".  Outcome: Progressing  Goal: Absence of Hospital-Acquired Illness or Injury  Outcome: Progressing  Intervention: Identify and Manage Fall Risk  Recent Flowsheet Documentation  Taken 3/23/2025 2000 by Araseli Erickson RN  Safety Promotion/Fall Prevention:   activity supervised   clutter free environment maintained   lighting adjusted   nonskid shoes/slippers when out of bed   patient and family education   room near nurse's station   room organization consistent  Intervention: Prevent Skin Injury  Recent Flowsheet Documentation  Taken 3/23/2025 2000 by Araseli Erickson RN  Body Position: position changed independently  Intervention: Prevent and Manage VTE (Venous Thromboembolism) Risk  Recent " Flowsheet Documentation  Taken 3/23/2025 2000 by Araseli Erickson RN  VTE Prevention/Management: SCDs off (sequential compression devices)  Intervention: Prevent Infection  Recent Flowsheet Documentation  Taken 3/23/2025 2000 by Araseli Erickson RN  Infection Prevention:   cohorting utilized   rest/sleep promoted   single patient room provided  Goal: Optimal Comfort and Wellbeing  Outcome: Progressing  Intervention: Monitor Pain and Promote Comfort  Recent Flowsheet Documentation  Taken 3/23/2025 2000 by Araseli Erickson RN  Pain Management Interventions: medication (see MAR)  Goal: Readiness for Transition of Care  Outcome: Progressing   Goal Outcome Evaluation:      Plan of Care Reviewed With: patient    Overall Patient Progress: improvingOverall Patient Progress: improving    Outcome Evaluation: Ind in room, voiding spontaneously-adequate amounts, clear yellow urine, no dysuria, pain controlled w/ tylenol & oxy

## 2025-03-24 NOTE — DISCHARGE SUMMARY
[facility] Discharge Summary    Enoch Barrios MRN# [id number]   Age: 44 year old YOB: 1980     Date of Admission:  3/21/2025  Date of Discharge::  3/24/2025  Admitting Physician:  Justin Rubalcava DO  Discharge Physician:  Yovani Hand MD     Home clinic: [facility]          Admission Diagnoses:   Right ureteral stone [N20.1]          Discharge Diagnosis:     Principal Problem:    Right ureteral stone  Active Problems:    ALYSSIA (acute kidney injury)    Primary hypertension    Septic shock due to Escherichia coli (H)    Hyperglycemia    Pyelonephritis     Lactic acidosis due to fluid-responsive septic shock          Procedures:   Cystoscopy, right retrograde pyelogram, interpretation of fluoroscopic images. Right ureteral stent placement.          Medications Prior to Admission:     Medications Prior to Admission   Medication Sig Dispense Refill Last Dose/Taking    acetaminophen (TYLENOL) 650 MG CR tablet Take 650 mg by mouth daily as needed for pain.   Taking As Needed    amLODIPine (NORVASC) 5 MG tablet Take 5 mg by mouth daily.   Past Week    levothyroxine (SYNTHROID/LEVOTHROID) 200 MCG tablet Take 400 mcg by mouth daily.   Past Week    metoprolol succinate ER (TOPROL XL) 100 MG 24 hr tablet Take 100 mg by mouth daily.   Past Week    omeprazole 20 MG tablet Take 20 mg by mouth daily   Past Week    [DISCONTINUED] losartan (COZAAR) 100 MG tablet Take 100 mg by mouth daily.   Past Week    [DISCONTINUED] oxyBUTYnin ER (DITROPAN XL) 15 MG 24 hr tablet Take 1 tablet by mouth daily.                Discharge Medications:     Current Discharge Medication List        START taking these medications    Details   fluconazole (DIFLUCAN) 150 MG tablet Take 1 tablet (150 mg) by mouth every 3 days for 1 dose.  Qty: 1 tablet, Refills: 0    Associated Diagnoses: Yeast vaginitis      ondansetron (ZOFRAN ODT) 4 MG ODT tab Take 1 tablet (4 mg) by mouth every 6 hours as needed for nausea or  vomiting.  Qty: 15 tablet, Refills: 0    Associated Diagnoses: Right ureteral stone      oxyCODONE (ROXICODONE) 5 MG tablet Take 1-2 tablets (5-10 mg) by mouth every 6 hours as needed for pain.  Qty: 24 tablet, Refills: 0    Associated Diagnoses: Right ureteral stone      sulfamethoxazole-trimethoprim (BACTRIM DS) 800-160 MG tablet Take 1 tablet by mouth 2 times daily for 7 days.  Qty: 14 tablet, Refills: 0    Associated Diagnoses: Septic shock due to Escherichia coli (H); Acute cystitis with hematuria; Kidney stone      tamsulosin (FLOMAX) 0.4 MG capsule Take 1 capsule (0.4 mg) by mouth daily.  Qty: 30 capsule, Refills: 0    Associated Diagnoses: Right ureteral stone           CONTINUE these medications which have CHANGED    Details   losartan (COZAAR) 100 MG tablet Take 1 tablet (100 mg) by mouth daily.    Comments: Do not resume until Wednesday, 3/26.  Associated Diagnoses: ALYSSIA (acute kidney injury)      oxyBUTYnin ER (DITROPAN XL) 15 MG 24 hr tablet Take 1 tablet (15 mg) by mouth daily.  Qty: 30 tablet, Refills: 0    Associated Diagnoses: Acute cystitis with hematuria           CONTINUE these medications which have NOT CHANGED    Details   acetaminophen (TYLENOL) 650 MG CR tablet Take 650 mg by mouth daily as needed for pain.      amLODIPine (NORVASC) 5 MG tablet Take 5 mg by mouth daily.      levothyroxine (SYNTHROID/LEVOTHROID) 200 MCG tablet Take 400 mcg by mouth daily.      metoprolol succinate ER (TOPROL XL) 100 MG 24 hr tablet Take 100 mg by mouth daily.      omeprazole 20 MG tablet Take 20 mg by mouth daily                   Consultations:   Consultation during this admission received from urology          Brief History of Illness:   Enoch Barrios is a 44 year old female admitted on 3/21/2025 with obstructive ureterolithiasis and was taken to the OR for stenting.       Ms. Barrios had come to attention with abrupt onset right flank pain and was noted to have a UTI. She had not reported fevers, but UA  "was strongly consistent with UTI, WBC 16, CT showed perinephric stranding and the LA was elevated.            Hospital Course:   Following the procedure, the pt's lactate continued to rise despite vigorous fluid resuscitation that included 3 L crystalloid in the emergency department.  Despite the rising lactic acid, patient's blood pressures remained stable.    Patient empirically received 1 g of ceftriaxone in the emergency department and subsequently was treated with Zosyn.  Ultimately, her blood cultures returned positive for an E. Coli that was resistant to fluoroquinolones but intermediate to ampicillin sulbactam.  She was treated with Zosyn for the 4 days of her hospital stay.      In addition to the rising lactic acid, the patient's creatinine bumped after surgery.  This was felt to be another marker of sepsis.  She was managed with supportive care and at the time of discharge, creatinine is improving.    Upon discharge, I attempted to give her multiple oral cephalosporins but for 1 reason or another (not available, not covered by insurance, not clearly indicated for complicated urinary tract infection) I ultimately decided to give her Bactrim DS to complete an additional week of therapy.    BP (!) 154/97 (BP Location: Right arm)   Pulse 97   Temp 98.2  F (36.8  C) (Oral)   Resp 18   Ht 1.676 m (5' 6\")   Wt 112.6 kg (248 lb 3.8 oz)   SpO2 94%   BMI 40.07 kg/m    On the date of discharge, Ms. Barrios is alert, coherent and in no apparent distress.  HEENT: No facial muscular weakness.  EOMI.  Chest: Normal work of breathing.  Clear to auscultation.  Heart: Regular rate and rhythm without murmur.  Abdomen: Soft without obvious mass.  Patient continues to complain of some right sided flank and lower abdominal pain though this is improving.         Discharge Instructions and Follow-Up:     Discharge diet: Regular   Discharge activity: Activity as tolerated   Discharge follow-up: With urology as planned.     "        Discharge Disposition:     Discharged to home      Attestation:  I have reviewed today's vital signs, notes, medications, labs and imaging.    Yovani Hand MD

## 2025-03-25 ENCOUNTER — PATIENT OUTREACH (OUTPATIENT)
Dept: CARE COORDINATION | Facility: CLINIC | Age: 45
End: 2025-03-25
Payer: COMMERCIAL

## 2025-03-25 NOTE — PROGRESS NOTES
[facility]: Transitions of Care Outreach  Chief Complaint   Patient presents with    Clinic Care Coordination - Post Hospital       Most Recent Admission Date: 3/21/2025   Most Recent Admission Diagnosis: Right ureteral stone - N20.1     Most Recent Discharge Date: 3/24/2025   Most Recent Discharge Diagnosis: Right ureteral stone - N20.1  Pyelonephritis - N12  ALYSSIA (acute kidney injury) - N17.9  Yeast vaginitis - B37.31  Septic shock due to Escherichia coli (H) - A41.51, R65.21  Acute cystitis with hematuria - N30.01  Kidney stone - N20.0     Transitions of Care Assessment    Discharge Assessment  How are you doing now that you are home?: Writer spoke to patient who is doing well, she asked for the number for [facility]. It was passed along to her from her AVS. No other questions or concerns.  How are your symptoms? (Red Flag symptoms escalate to triage hotline per guidelines): Improved  Does the patient have their discharge instructions? : Yes  Does the patient have questions regarding their discharge instructions? : No  Were you started on any new medications or were there changes to any of your previous medications? : Yes  Does the patient have all of their medications?: Yes  Do you have questions regarding any of your medications? : No  Do you have all of your needed medical supplies or equipment (DME)?  (i.e. oxygen tank, CPAP, cane, etc.): Yes              Follow up Plan   Follow Up  Follow up with urology, as planned.  Discharge Follow-Up  Discharge follow up appointment scheduled in alignment with recommended follow up timeframe or Transitions of Risk Category? (Low = within 30 days; Moderate= within 14 days; High= within 7 days): No (She will call today.)  Patient's follow up appointment not scheduled: Patient declined scheduling support. Education on the importance of transitions of care follow up. Provided scheduling phone number.    No future appointments.    Outpatient Plan as  outlined on AVS reviewed with patient.    For any urgent concerns, please contact our 24 hour nurse triage line: [phone] ([phone])       JONATHAN Lau

## 2025-03-27 ENCOUNTER — TELEPHONE (OUTPATIENT)
Dept: UROLOGY | Facility: CLINIC | Age: 45
End: 2025-03-27
Payer: COMMERCIAL

## 2025-03-27 NOTE — TELEPHONE ENCOUNTER
Pt called back, confirmed surgery date. Went through surgery info over the phone. I told her she doesn't need a preop. Surgery instructions sent via email.

## 2025-04-09 ENCOUNTER — HOSPITAL ENCOUNTER (OUTPATIENT)
Facility: CLINIC | Age: 45
Discharge: HOME | End: 2025-04-09
Attending: UROLOGY | Admitting: UROLOGY
Payer: COMMERCIAL

## 2025-04-09 ENCOUNTER — ANESTHESIA EVENT (OUTPATIENT)
Dept: SURGERY | Facility: CLINIC | Age: 45
End: 2025-04-09
Payer: COMMERCIAL

## 2025-04-09 ENCOUNTER — ANESTHESIA (OUTPATIENT)
Dept: SURGERY | Facility: CLINIC | Age: 45
End: 2025-04-09
Payer: COMMERCIAL

## 2025-04-09 VITALS
SYSTOLIC BLOOD PRESSURE: 137 MMHG | RESPIRATION RATE: 15 BRPM | TEMPERATURE: 98.2 F | HEART RATE: 79 BPM | BODY MASS INDEX: 36.71 KG/M2 | DIASTOLIC BLOOD PRESSURE: 87 MMHG | WEIGHT: 228.4 LBS | HEIGHT: 66 IN | OXYGEN SATURATION: 95 %

## 2025-04-09 PROCEDURE — C1758 CATHETER, URETERAL: HCPCS | Performed by: UROLOGY

## 2025-04-09 PROCEDURE — C1769 GUIDE WIRE: HCPCS | Performed by: UROLOGY

## 2025-04-09 PROCEDURE — 250N000011 HC RX IP 250 OP 636: Performed by: UROLOGY

## 2025-04-09 PROCEDURE — 250N000011 HC RX IP 250 OP 636: Performed by: NURSE ANESTHETIST, CERTIFIED REGISTERED

## 2025-04-09 PROCEDURE — 272N000001 HC OR GENERAL SUPPLY STERILE: Performed by: UROLOGY

## 2025-04-09 PROCEDURE — 74420 UROGRAPHY RTRGR +-KUB: CPT | Mod: 26 | Performed by: UROLOGY

## 2025-04-09 PROCEDURE — 250N000025 HC SEVOFLURANE, PER MIN: Performed by: UROLOGY

## 2025-04-09 PROCEDURE — 258N000003 HC RX IP 258 OP 636: Performed by: NURSE ANESTHETIST, CERTIFIED REGISTERED

## 2025-04-09 PROCEDURE — 710N000012 HC RECOVERY PHASE 2, PER MINUTE: Performed by: UROLOGY

## 2025-04-09 PROCEDURE — 250N000009 HC RX 250: Performed by: UROLOGY

## 2025-04-09 PROCEDURE — 360N000083 HC SURGERY LEVEL 3 W/ FLUORO, PER MIN: Performed by: UROLOGY

## 2025-04-09 PROCEDURE — 999N000141 HC STATISTIC PRE-PROCEDURE NURSING ASSESSMENT: Performed by: UROLOGY

## 2025-04-09 PROCEDURE — 255N000002 HC RX 255 OP 636: Performed by: UROLOGY

## 2025-04-09 PROCEDURE — 82365 CALCULUS SPECTROSCOPY: CPT | Performed by: UROLOGY

## 2025-04-09 PROCEDURE — 250N000009 HC RX 250: Performed by: NURSE ANESTHETIST, CERTIFIED REGISTERED

## 2025-04-09 PROCEDURE — 370N000017 HC ANESTHESIA TECHNICAL FEE, PER MIN: Performed by: UROLOGY

## 2025-04-09 PROCEDURE — 250N000011 HC RX IP 250 OP 636: Mod: JZ | Performed by: ANESTHESIOLOGY

## 2025-04-09 PROCEDURE — 250N000013 HC RX MED GY IP 250 OP 250 PS 637: Performed by: UROLOGY

## 2025-04-09 PROCEDURE — 250N000013 HC RX MED GY IP 250 OP 250 PS 637: Performed by: ANESTHESIOLOGY

## 2025-04-09 PROCEDURE — 710N000009 HC RECOVERY PHASE 1, LEVEL 1, PER MIN: Performed by: UROLOGY

## 2025-04-09 PROCEDURE — 52353 CYSTOURETERO W/LITHOTRIPSY: CPT | Mod: RT | Performed by: UROLOGY

## 2025-04-09 PROCEDURE — 258N000001 HC RX 258: Performed by: UROLOGY

## 2025-04-09 RX ORDER — DEXAMETHASONE SODIUM PHOSPHATE 4 MG/ML
4 INJECTION, SOLUTION INTRA-ARTICULAR; INTRALESIONAL; INTRAMUSCULAR; INTRAVENOUS; SOFT TISSUE
Status: DISCONTINUED | OUTPATIENT
Start: 2025-04-09 | End: 2025-04-09 | Stop reason: HOSPADM

## 2025-04-09 RX ORDER — OXYCODONE HYDROCHLORIDE 5 MG/1
5 TABLET ORAL
Status: DISCONTINUED | OUTPATIENT
Start: 2025-04-09 | End: 2025-04-09 | Stop reason: HOSPADM

## 2025-04-09 RX ORDER — HYDROMORPHONE HYDROCHLORIDE 1 MG/ML
0.5 INJECTION, SOLUTION INTRAMUSCULAR; INTRAVENOUS; SUBCUTANEOUS ONCE
Status: COMPLETED | OUTPATIENT
Start: 2025-04-09 | End: 2025-04-09

## 2025-04-09 RX ORDER — NALOXONE HYDROCHLORIDE 0.4 MG/ML
0.1 INJECTION, SOLUTION INTRAMUSCULAR; INTRAVENOUS; SUBCUTANEOUS
Status: DISCONTINUED | OUTPATIENT
Start: 2025-04-09 | End: 2025-04-09 | Stop reason: HOSPADM

## 2025-04-09 RX ORDER — HYDROMORPHONE HYDROCHLORIDE 1 MG/ML
0.5 INJECTION, SOLUTION INTRAMUSCULAR; INTRAVENOUS; SUBCUTANEOUS EVERY 5 MIN PRN
Status: DISCONTINUED | OUTPATIENT
Start: 2025-04-09 | End: 2025-04-09 | Stop reason: HOSPADM

## 2025-04-09 RX ORDER — ONDANSETRON 2 MG/ML
4 INJECTION INTRAMUSCULAR; INTRAVENOUS EVERY 30 MIN PRN
Status: DISCONTINUED | OUTPATIENT
Start: 2025-04-09 | End: 2025-04-09 | Stop reason: HOSPADM

## 2025-04-09 RX ORDER — LIDOCAINE 40 MG/G
CREAM TOPICAL
Status: DISCONTINUED | OUTPATIENT
Start: 2025-04-09 | End: 2025-04-09 | Stop reason: HOSPADM

## 2025-04-09 RX ORDER — OXYCODONE HYDROCHLORIDE 5 MG/1
10 TABLET ORAL
Status: COMPLETED | OUTPATIENT
Start: 2025-04-09 | End: 2025-04-09

## 2025-04-09 RX ORDER — SODIUM CHLORIDE, SODIUM LACTATE, POTASSIUM CHLORIDE, CALCIUM CHLORIDE 600; 310; 30; 20 MG/100ML; MG/100ML; MG/100ML; MG/100ML
INJECTION, SOLUTION INTRAVENOUS CONTINUOUS
Status: DISCONTINUED | OUTPATIENT
Start: 2025-04-09 | End: 2025-04-09 | Stop reason: HOSPADM

## 2025-04-09 RX ORDER — LABETALOL HYDROCHLORIDE 5 MG/ML
10 INJECTION, SOLUTION INTRAVENOUS
Status: DISCONTINUED | OUTPATIENT
Start: 2025-04-09 | End: 2025-04-09 | Stop reason: HOSPADM

## 2025-04-09 RX ORDER — ACETAMINOPHEN 325 MG/1
975 TABLET ORAL ONCE
Status: COMPLETED | OUTPATIENT
Start: 2025-04-09 | End: 2025-04-09

## 2025-04-09 RX ORDER — PROPOFOL 10 MG/ML
INJECTION, EMULSION INTRAVENOUS PRN
Status: DISCONTINUED | OUTPATIENT
Start: 2025-04-09 | End: 2025-04-09

## 2025-04-09 RX ORDER — ALBUTEROL SULFATE 0.83 MG/ML
2.5 SOLUTION RESPIRATORY (INHALATION) EVERY 4 HOURS PRN
Status: DISCONTINUED | OUTPATIENT
Start: 2025-04-09 | End: 2025-04-09 | Stop reason: HOSPADM

## 2025-04-09 RX ORDER — LIDOCAINE HYDROCHLORIDE 20 MG/ML
INJECTION, SOLUTION INFILTRATION; PERINEURAL PRN
Status: DISCONTINUED | OUTPATIENT
Start: 2025-04-09 | End: 2025-04-09

## 2025-04-09 RX ORDER — MAGNESIUM SULFATE HEPTAHYDRATE 40 MG/ML
2 INJECTION, SOLUTION INTRAVENOUS
Status: DISCONTINUED | OUTPATIENT
Start: 2025-04-09 | End: 2025-04-09 | Stop reason: HOSPADM

## 2025-04-09 RX ORDER — FENTANYL CITRATE 50 UG/ML
50 INJECTION, SOLUTION INTRAMUSCULAR; INTRAVENOUS EVERY 5 MIN PRN
Status: DISCONTINUED | OUTPATIENT
Start: 2025-04-09 | End: 2025-04-09 | Stop reason: HOSPADM

## 2025-04-09 RX ORDER — ONDANSETRON 2 MG/ML
INJECTION INTRAMUSCULAR; INTRAVENOUS PRN
Status: DISCONTINUED | OUTPATIENT
Start: 2025-04-09 | End: 2025-04-09

## 2025-04-09 RX ORDER — KETOROLAC TROMETHAMINE 15 MG/ML
15 INJECTION, SOLUTION INTRAMUSCULAR; INTRAVENOUS
Status: COMPLETED | OUTPATIENT
Start: 2025-04-09 | End: 2025-04-09

## 2025-04-09 RX ORDER — DEXAMETHASONE SODIUM PHOSPHATE 4 MG/ML
INJECTION, SOLUTION INTRA-ARTICULAR; INTRALESIONAL; INTRAMUSCULAR; INTRAVENOUS; SOFT TISSUE PRN
Status: DISCONTINUED | OUTPATIENT
Start: 2025-04-09 | End: 2025-04-09

## 2025-04-09 RX ORDER — FENTANYL CITRATE 50 UG/ML
25 INJECTION, SOLUTION INTRAMUSCULAR; INTRAVENOUS EVERY 5 MIN PRN
Status: DISCONTINUED | OUTPATIENT
Start: 2025-04-09 | End: 2025-04-09 | Stop reason: HOSPADM

## 2025-04-09 RX ORDER — ACETAMINOPHEN 650 MG/1
650 SUPPOSITORY RECTAL ONCE
Status: COMPLETED | OUTPATIENT
Start: 2025-04-09 | End: 2025-04-09

## 2025-04-09 RX ORDER — HYDRALAZINE HYDROCHLORIDE 20 MG/ML
5-10 INJECTION INTRAMUSCULAR; INTRAVENOUS EVERY 10 MIN PRN
Status: DISCONTINUED | OUTPATIENT
Start: 2025-04-09 | End: 2025-04-09 | Stop reason: HOSPADM

## 2025-04-09 RX ORDER — SODIUM CHLORIDE, SODIUM LACTATE, POTASSIUM CHLORIDE, CALCIUM CHLORIDE 600; 310; 30; 20 MG/100ML; MG/100ML; MG/100ML; MG/100ML
INJECTION, SOLUTION INTRAVENOUS CONTINUOUS PRN
Status: DISCONTINUED | OUTPATIENT
Start: 2025-04-09 | End: 2025-04-09

## 2025-04-09 RX ORDER — FENTANYL CITRATE 50 UG/ML
INJECTION, SOLUTION INTRAMUSCULAR; INTRAVENOUS PRN
Status: DISCONTINUED | OUTPATIENT
Start: 2025-04-09 | End: 2025-04-09

## 2025-04-09 RX ORDER — ONDANSETRON 4 MG/1
4 TABLET, ORALLY DISINTEGRATING ORAL EVERY 30 MIN PRN
Status: DISCONTINUED | OUTPATIENT
Start: 2025-04-09 | End: 2025-04-09 | Stop reason: HOSPADM

## 2025-04-09 RX ORDER — CEFAZOLIN SODIUM/WATER 2 G/20 ML
2 SYRINGE (ML) INTRAVENOUS
Status: DISCONTINUED | OUTPATIENT
Start: 2025-04-09 | End: 2025-04-09 | Stop reason: HOSPADM

## 2025-04-09 RX ORDER — CEFAZOLIN SODIUM/WATER 2 G/20 ML
2 SYRINGE (ML) INTRAVENOUS SEE ADMIN INSTRUCTIONS
Status: DISCONTINUED | OUTPATIENT
Start: 2025-04-09 | End: 2025-04-09 | Stop reason: HOSPADM

## 2025-04-09 RX ADMIN — SODIUM CHLORIDE, SODIUM LACTATE, POTASSIUM CHLORIDE, AND CALCIUM CHLORIDE: .6; .31; .03; .02 INJECTION, SOLUTION INTRAVENOUS at 15:31

## 2025-04-09 RX ADMIN — PROPOFOL 150 MG: 10 INJECTION, EMULSION INTRAVENOUS at 15:08

## 2025-04-09 RX ADMIN — FENTANYL CITRATE 100 MCG: 50 INJECTION INTRAMUSCULAR; INTRAVENOUS at 15:08

## 2025-04-09 RX ADMIN — LIDOCAINE HYDROCHLORIDE 50 MG: 20 INJECTION, SOLUTION INFILTRATION; PERINEURAL at 15:08

## 2025-04-09 RX ADMIN — Medication 2 G: at 15:03

## 2025-04-09 RX ADMIN — KETOROLAC TROMETHAMINE 15 MG: 15 INJECTION, SOLUTION INTRAMUSCULAR; INTRAVENOUS at 15:52

## 2025-04-09 RX ADMIN — HYDROMORPHONE HYDROCHLORIDE 0.5 MG: 1 INJECTION, SOLUTION INTRAMUSCULAR; INTRAVENOUS; SUBCUTANEOUS at 16:51

## 2025-04-09 RX ADMIN — MIDAZOLAM 2 MG: 1 INJECTION INTRAMUSCULAR; INTRAVENOUS at 15:02

## 2025-04-09 RX ADMIN — DEXAMETHASONE SODIUM PHOSPHATE 4 MG: 4 INJECTION, SOLUTION INTRA-ARTICULAR; INTRALESIONAL; INTRAMUSCULAR; INTRAVENOUS; SOFT TISSUE at 15:08

## 2025-04-09 RX ADMIN — ACETAMINOPHEN 975 MG: 325 TABLET, FILM COATED ORAL at 12:33

## 2025-04-09 RX ADMIN — FENTANYL CITRATE 50 MCG: 50 INJECTION, SOLUTION INTRAMUSCULAR; INTRAVENOUS at 16:03

## 2025-04-09 RX ADMIN — OXYCODONE HYDROCHLORIDE 10 MG: 5 TABLET ORAL at 16:10

## 2025-04-09 RX ADMIN — ONDANSETRON 4 MG: 2 INJECTION INTRAMUSCULAR; INTRAVENOUS at 15:08

## 2025-04-09 RX ADMIN — PHENYLEPHRINE HYDROCHLORIDE 200 MCG: 10 INJECTION INTRAVENOUS at 15:28

## 2025-04-09 RX ADMIN — SODIUM CHLORIDE, SODIUM LACTATE, POTASSIUM CHLORIDE, AND CALCIUM CHLORIDE: .6; .31; .03; .02 INJECTION, SOLUTION INTRAVENOUS at 14:50

## 2025-04-09 RX ADMIN — PHENYLEPHRINE HYDROCHLORIDE 200 MCG: 10 INJECTION INTRAVENOUS at 15:22

## 2025-04-09 ASSESSMENT — ACTIVITIES OF DAILY LIVING (ADL)
ADLS_ACUITY_SCORE: 35
ADLS_ACUITY_SCORE: 35
ADLS_ACUITY_SCORE: 36
ADLS_ACUITY_SCORE: 35

## 2025-04-09 NOTE — ANESTHESIA CARE TRANSFER NOTE
Patient: Enoch Barrios    Procedure: Procedure(s):  Cystoscopy, right ureteral stent removal, right ureteroscopy with laser lithotripsy and stone basketing       Diagnosis: Ureteral stone [N20.1]  Diagnosis Additional Information: No value filed.    Anesthesia Type:   General     Note:    Oropharynx: spontaneously breathing  Level of Consciousness: awake  Oxygen Supplementation: face mask    Independent Airway: airway patency satisfactory and stable  Dentition: dentition unchanged  Vital Signs Stable: post-procedure vital signs reviewed and stable  Report to RN Given: handoff report given  Patient transferred to: PACU    Handoff Report: Identifed the Patient, Identified the Reponsible Provider, Reviewed the pertinent medical history, Discussed the surgical course, Reviewed Intra-OP anesthesia mangement and issues during anesthesia, Set expectations for post-procedure period and Allowed opportunity for questions and acknowledgement of understanding  Vitals:  Vitals Value Taken Time   /82 04/09/25 1537   Temp     Pulse 86 04/09/25 1539   Resp 17 04/09/25 1539   SpO2 100 % 04/09/25 1539   Vitals shown include unfiled device data.    Electronically Signed By: MARY Echeverria CRNA  April 9, 2025  3:40 PM

## 2025-04-09 NOTE — ANESTHESIA PROCEDURE NOTES
Airway       Patient location during procedure: OR  Staff -        Performed By: CRNA  Consent for Airway        Urgency: elective  Indications and Patient Condition       Indications for airway management: fredy-procedural and airway protection       Induction type:intravenous       Mask difficulty assessment: 1 - vent by mask    Final Airway Details       Final airway type: supraglottic airway    Supraglottic Airway Details        Type: LMA       Brand: I-Gel       LMA size: 4    Post intubation assessment        Placement verified by: capnometry        Number of attempts at approach: 1       Number of other approaches attempted: 0       Secured with: commercial tube crook       Ease of procedure: easy       Dentition: Intact

## 2025-04-09 NOTE — ANESTHESIA PREPROCEDURE EVALUATION
Anesthesia Pre-Procedure Evaluation    Patient: Enoch Barrios   MRN: [id number] : 1980        Procedure : Procedure(s):  Cystoscopy, right ureteral stent removal, right ureteroscopy with laser lithotripsy and stone basketing, possible right ureteral stent placement          Past Medical History:   Diagnosis Date    Abnormal Pap smear     colposcopy    Chronic kidney disease     Gastroesophageal reflux disease     Hypertension     Thyroid disease     on thyroid meds/hypothyroid      Past Surgical History:   Procedure Laterality Date    COMBINED CYSTOSCOPY, INSERT STENT URETER(S) Right 3/24/2020    Procedure: Video cystoscopy, right double-J stent placement (5-Setswana x24 cm).;  Surgeon: Joseph Dangelo MD;  Location: [facility]    CYSTOSCOPY, RETROGRADES, INSERT STENT URETER(S), COMBINED Right 9/3/2015    Procedure: COMBINED CYSTOSCOPY, RETROGRADES, INSERT STENT URETER(S);  Surgeon: Joseph Dangelo MD;  Location: [facility]    CYSTOSCOPY, RETROGRADES, INSERT STENT URETER(S), COMBINED Right 3/21/2025    Procedure: Cystoscopy, right retrograde pyelogram, interpretation of fluoroscopic images, right ureteral stent placement;  Surgeon: Ruben Zacarias MD;  Location: [facility]    DILATION AND CURETTAGE SUCTION N/A 2016    Procedure: DILATION AND CURETTAGE SUCTION;  Surgeon: Albert Hammer MD;  Location: [facility]    OPEN REDUCTION INTERNAL FIXATION FOOT Right 2019    Procedure: Open reduction internal fixation right LisFranc fracture, open reduction internal fixation right third metatarsal base fracture;  Surgeon: Monroe Skelton MD;  Location: [facility]      No Known Allergies   Social History     Tobacco Use    Smoking status: Never    Smokeless tobacco: Never   Substance Use Topics    Alcohol use: No      Wt Readings from Last 1 Encounters:   25 103.6 kg (228 lb 6.4 oz)        Anesthesia Evaluation   Pt has had prior anesthetic.     No history of anesthetic complications       ROS/MED  HX  ENT/Pulmonary:  - neg pulmonary ROS     Neurologic:  - neg neurologic ROS     Cardiovascular:     (+)  hypertension- -   -  - -                                      METS/Exercise Tolerance:     Hematologic:     (+)      anemia,          Musculoskeletal:  - neg musculoskeletal ROS     GI/Hepatic:     (+) GERD,                   Renal/Genitourinary:     (+) renal disease,      Nephrolithiasis ,       Endo:     (+)          thyroid problem, hypothyroidism,    Obesity,       Psychiatric/Substance Use:  - neg psychiatric ROS     Infectious Disease:  - neg infectious disease ROS     Malignancy:       Other:            Physical Exam    Airway        Mallampati: II   TM distance: > 3 FB   Neck ROM: full   Mouth opening: > 3 cm    Respiratory Devices and Support         Dental       (+) Modest Abnormalities - crowns, retainers, 1 or 2 missing teeth      Cardiovascular          Rhythm and rate: regular and normal     Pulmonary           breath sounds clear to auscultation           OUTSIDE LABS:  CBC:   Lab Results   Component Value Date    WBC 15.2 (H) 03/24/2025    WBC 16.0 (H) 03/23/2025    HGB 9.9 (L) 03/24/2025    HGB 9.2 (L) 03/23/2025    HCT 30.8 (L) 03/24/2025    HCT 29.1 (L) 03/23/2025    PLT 71 (L) 03/24/2025    PLT 64 (L) 03/23/2025     BMP:   Lab Results   Component Value Date     03/24/2025     03/23/2025    POTASSIUM 4.0 03/24/2025    POTASSIUM 3.9 03/23/2025    CHLORIDE 103 03/24/2025    CHLORIDE 106 03/23/2025    CO2 22 03/24/2025    CO2 22 03/23/2025    BUN 18.0 03/24/2025    BUN 21.2 (H) 03/23/2025    CR 1.96 (H) 03/24/2025    CR 2.17 (H) 03/23/2025     (H) 03/24/2025     (H) 03/23/2025     COAGS:   Lab Results   Component Value Date    INR 0.92 05/07/2016     POC:   Lab Results   Component Value Date    HCG Negative 03/21/2025    HCGS Negative 03/21/2025     HEPATIC:   Lab Results   Component Value Date    ALBUMIN 4.1 03/21/2025    PROTTOTAL 7.1 03/21/2025    ALT 17 03/21/2025  "   AST 24 03/21/2025    ALKPHOS 101 03/21/2025    BILITOTAL 0.4 03/21/2025    BILIDIRECT 0.1 04/23/2012     OTHER:   Lab Results   Component Value Date    LACT 4.9 (HH) 03/22/2025    A1C 5.2 03/22/2025    YENY 8.9 03/24/2025    MAG 1.6 09/03/2015    LIPASE 43 03/21/2025    T4 0.5 (L) 08/17/2012       Anesthesia Plan    ASA Status:  2    NPO Status:  NPO Appropriate    Anesthesia Type: General.     - Airway: LMA   Induction: Intravenous.   Maintenance: Balanced.        Consents    Anesthesia Plan(s) and associated risks, benefits, and realistic alternatives discussed. Questions answered and patient/representative(s) expressed understanding.     - Discussed:     - Discussed with:  Patient      - Extended Intubation/Ventilatory Support Discussed: No.      - Patient is DNR/DNI Status: No     Use of blood products discussed: No .     Postoperative Care    Pain management: IV analgesics, Oral pain medications, Multi-modal analgesia.   PONV prophylaxis: Dexamethasone or Solumedrol, Ondansetron (or other 5HT-3)     Comments:               Feilx Beatty MD    Clinically Significant Risk Factors Present on Admission                   # Hypertension: Noted on problem list           # Obesity: Estimated body mass index is 36.88 kg/m  as calculated from the following:    Height as of this encounter: 1.676 m (5' 5.98\").    Weight as of this encounter: 103.6 kg (228 lb 6.4 oz).                "

## 2025-04-09 NOTE — ANESTHESIA POSTPROCEDURE EVALUATION
Patient: Enoch Barrios    Procedure: Procedure(s):  Cystoscopy, right ureteral stent removal, right ureteroscopy with laser lithotripsy and stone basketing       Anesthesia Type:  General    Note:  Disposition: Outpatient   Postop Pain Control: Uneventful            Sign Out: Well controlled pain   PONV: No   Neuro/Psych: Uneventful            Sign Out: Acceptable/Baseline neuro status   Airway/Respiratory: Uneventful            Sign Out: Acceptable/Baseline resp. status   CV/Hemodynamics: Uneventful            Sign Out: Acceptable CV status   Other NRE: NONE   DID A NON-ROUTINE EVENT OCCUR? No           Last vitals:  Vitals Value Taken Time   /84 04/09/25 1620   Temp 97.4  F (36.3  C) 04/09/25 1615   Pulse 87 04/09/25 1624   Resp 14 04/09/25 1624   SpO2 94 % 04/09/25 1625   Vitals shown include unfiled device data.    Electronically Signed By: Felix Beatty MD  April 9, 2025  5:45 PM

## 2025-04-09 NOTE — OP NOTE
OPERATIVE REPORT    PREOPERATIVE DIAGNOSIS: Right ureteral stone    POSTOPERATIVE DIAGNOSIS: Same    PROCEDURES PERFORMED: Cystoscopy, right ureteral stent removal, right ureteroscopy with thulium laser lithotripsy and stone basketing.  Right retrograde pyelogram, interpretation of fluoroscopic images.    SURGEON: Ruben Zacarias M.D.    ANESTHESIA: General    COMPLICATIONS: None.     SIGNIFICANT FINDINGS:       BRIEF OPERATIVE INDICATIONS: Enoch Barrios is a(n) 44 year old female who presented with a urinary tract infection and obstructing ureteral stone who had a stent placed previously.  She now presents for stent removal and stone extraction.  After a discussion of all risks, benefits, and alternatives, the patient elected to proceed with definitive stone management. The patient understands the potential need for more than one procedure to eliminate all stone burden.      DESCRIPTION OF PROCEDURE:  After informed consent was obtained, the patient was transported to the operating room & placed supine on the table. After adequate anesthesia was induced, the patient was placed in lithotomy and prepped and draped in the usual sterile fashion. A timeout was taken to confirm correct patient, procedure and laterality. Pre-operative IV antibiotics were administered.     I introduced the 22 Portuguese rigid cystoscope through the urethra into the bladder and performed cystoscopy.  The bladder was normal throughout.  I grasped the right sided stent and pulled it to the level of the urethral meatus.  I cannulated the stent with a Glidewire under fluoroscopic guidance and then removed the stent.  Alongside the Glidewire passed the rigid ureteroscope through the ureter in spite of the right ureter.  The stone was seen in the distal right ureter.  I used the 200 micron thulium laser fiber to break up the stone into multiple fragments and then I basketed out the fragments and placed them in the bladder.  Once I removed all  fragments I could perform ureteroscopy although up to the right kidney no stones remained.  I injected contrast into the right kidney for a retrograde pyelogram and there are no filling defects or dilatations present.  I removed the ureteroscope.  I removed the wire and I did not replace the stent.  I drained the stones from the bladder and the procedure was concluded.    The patient taught the procedure well without complications.  She went to the postanesthetic care unit in good condition.  She will go home from there.    I will recommend that this patient sees one of our advanced practice providers in 6 months with an ultrasound to make certain she is not forming any hydronephrosis on the right side.

## 2025-04-09 NOTE — DISCHARGE INSTRUCTIONS
DR. CANDACE SOTO   [facility] PHONE NUMBER:  [phone]  [facility]     Maximum acetaminophen (Tylenol) dose from all sources should not exceed 4 grams (4000 mg) per day.     You received Toradol, an IV form of Ibuprofen (Motrin) at 3:50 p.m.  Do not take any Ibuprofen products until 9:50 p.m.

## 2025-04-10 DIAGNOSIS — N20.0 CALCULUS OF KIDNEY: Primary | ICD-10-CM

## 2025-04-11 ENCOUNTER — TELEPHONE (OUTPATIENT)
Dept: UROLOGY | Facility: CLINIC | Age: 45
End: 2025-04-11
Payer: COMMERCIAL

## 2025-04-11 NOTE — TELEPHONE ENCOUNTER
Patient called nurse line and SERGIO. She is needing FMLA paperwork or a return to work note. Will forward to RN care coordinator as business office is closed today.     Elham Beatty LPN

## 2025-04-11 NOTE — TELEPHONE ENCOUNTER
Spoke with patient, she's looking to have FMLA paperwork filled out post surgery with Dr. Zacarias on 4/9/25.  Patient has been out of work since 3/31/25 due to severe symptoms from kidney stones.  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  Patient will fax FMLA paperwork to our office for Lauren to fill out next week.    Renuka Vick, RN, BSN  Urology Care Coordinator  [facility]  [phone]

## 2025-04-13 LAB
APPEARANCE STONE: NORMAL
COMPN STONE: NORMAL
SPECIMEN WT: 29 MG

## 2025-04-29 ENCOUNTER — TELEPHONE (OUTPATIENT)
Dept: UROLOGY | Facility: CLINIC | Age: 45
End: 2025-04-29
Payer: COMMERCIAL

## 2025-04-29 NOTE — TELEPHONE ENCOUNTER
Has  had mild flank pain surgery   . However this weekend  went to [employer] and carried  a case of water and then had significant R  flank pain that required a pain pill  . States pain is isolated to R flank and does not feel it is musculoskeletal pain .     Also she is worried bout returning to work on Thursday as she  does heavy lifting .  Do you want her to do a CT scan? Also should she go back to work on Thursday  ?

## (undated) DEVICE — PAD CHUX UNDERPAD 30X36" P3036C

## (undated) DEVICE — GUIDEWIRE URO ANG .035"X150CM NITINOL 150NFA35

## (undated) DEVICE — LINEN HALF SHEET 5512

## (undated) DEVICE — Device

## (undated) DEVICE — TUBING IRRIG TUR Y TYPE 96" LF 6543-01

## (undated) DEVICE — BLADE SAW OSCIL/SAG STRK MICRO 5.5X25X0.38MM 2296-003-414

## (undated) DEVICE — PACK LOWER EXTREMITY RIDGES

## (undated) DEVICE — CONTRAST ISOVUE 300 61% IOPAMIDOL 10X30ML VIAL 131525

## (undated) DEVICE — GLOVE PROTEXIS POWDER FREE SMT 7.5  2D72PT75X

## (undated) DEVICE — PREP DYNA-HEX 4% CHG SCRUB 4OZ BOTTLE MDS098710

## (undated) DEVICE — GLOVE PROTEXIS POWDER FREE 8.0 ORTHOPEDIC 2D73ET80

## (undated) DEVICE — GLOVE PROTEXIS W/NEU-THERA 7.5  2D73TE75

## (undated) DEVICE — LINEN FULL SHEET 5511

## (undated) DEVICE — SOLUTION IV IRRIGATION 0.9% NACL 3L R8206

## (undated) DEVICE — BASKET STONE RETRIEVAL NTNL ZERO TIP 1.9FRX90CM M0063901050

## (undated) DEVICE — GLOVE BIOGEL PI ULTRATOUCH SZ 7.5 41175

## (undated) DEVICE — PACK CYSTO CUSTOM RIDGES

## (undated) DEVICE — CAST BUCKET

## (undated) DEVICE — SOL WATER IRRIG 1000ML BOTTLE 2F7114

## (undated) DEVICE — IMPLANTABLE DEVICE: Type: IMPLANTABLE DEVICE | Status: NON-FUNCTIONAL

## (undated) DEVICE — FIBER LASER 200 UM DISPOSABLE TFL-FBX200S

## (undated) DEVICE — BLADE KNIFE SURG 10 371110

## (undated) DEVICE — SU ETHIBOND 3-0 RB-1 30" X872H

## (undated) DEVICE — BAG CLEAR TRASH 1.3M 39X33" P4040C

## (undated) DEVICE — GUIDEWIRE URO STR STIFF .035"X150CM NITINOL 150NSS35

## (undated) DEVICE — SOLUTION IV WATER 1000ML R5000-01

## (undated) DEVICE — CAST PADDING 4" UNSTERILE 9044

## (undated) DEVICE — SUCTION TIP YANKAUER W/O VENT K86

## (undated) DEVICE — SOL WATER IRRIG 3000ML BAG 2B7117

## (undated) DEVICE — SOLUTION WATER 1000ML R5000-01

## (undated) DEVICE — SOL NACL 0.9% IRRIG 1000ML BOTTLE 2F7124

## (undated) DEVICE — SU VICRYL 3-0 CT-2 27" UND J232H

## (undated) DEVICE — CAST PADDING 4" STERILE 9044S

## (undated) DEVICE — CAST PLASTER SPLINT XTRA FAST 5X30" 7392

## (undated) DEVICE — TUBING IRR LG BORE TUBE DRIP CHMBR 2 BG 94IN 313003

## (undated) DEVICE — BNDG ELASTIC 6" DBL LENGTH UNSTERILE 6611-16

## (undated) DEVICE — SU VICRYL 0 CT-2 27" UND J270H

## (undated) DEVICE — PREP CHLORAPREP 26ML TINTED ORANGE  260815

## (undated) DEVICE — GLOVE PROTEXIS MICRO 8.0  2D73PM80

## (undated) DEVICE — PREP TECHNI-CARE CHLOROXYLENOL 3% 4OZ BOTTLE C222-4ZWO

## (undated) DEVICE — CATH URETERAL FLEX TIP TIGERTAIL 06FRX70CM 139006

## (undated) DEVICE — DRAPE C-ARM 60X42" 1013

## (undated) DEVICE — COVER FOOTSWITCH W/CINCH 20X24" 923267

## (undated) DEVICE — ESU GROUND PAD ADULT W/CORD E7507

## (undated) DEVICE — LINEN ORTHO ACL PACK 5447

## (undated) DEVICE — GLOVE BIOGEL PI MICRO INDICATOR UNDERGLOVE SZ 6.5 48965

## (undated) DEVICE — SU ETHILON 3-0 PS-2 18" 1669H

## (undated) RX ORDER — CEFAZOLIN SODIUM/WATER 2 G/20 ML
SYRINGE (ML) INTRAVENOUS
Status: DISPENSED
Start: 2025-04-09

## (undated) RX ORDER — ACETAMINOPHEN 325 MG/1
TABLET ORAL
Status: DISPENSED
Start: 2025-04-09

## (undated) RX ORDER — EPHEDRINE SULFATE 50 MG/ML
INJECTION, SOLUTION INTRAMUSCULAR; INTRAVENOUS; SUBCUTANEOUS
Status: DISPENSED
Start: 2020-03-24

## (undated) RX ORDER — ONDANSETRON 2 MG/ML
INJECTION INTRAMUSCULAR; INTRAVENOUS
Status: DISPENSED
Start: 2020-03-24

## (undated) RX ORDER — BUPIVACAINE HYDROCHLORIDE 5 MG/ML
INJECTION, SOLUTION EPIDURAL; INTRACAUDAL
Status: DISPENSED
Start: 2019-12-02

## (undated) RX ORDER — IBUPROFEN 600 MG/1
TABLET, FILM COATED ORAL
Status: DISPENSED
Start: 2019-12-02

## (undated) RX ORDER — PROPOFOL 10 MG/ML
INJECTION, EMULSION INTRAVENOUS
Status: DISPENSED
Start: 2020-03-24

## (undated) RX ORDER — FENTANYL CITRATE 50 UG/ML
INJECTION, SOLUTION INTRAMUSCULAR; INTRAVENOUS
Status: DISPENSED
Start: 2025-04-09

## (undated) RX ORDER — HYDROMORPHONE HYDROCHLORIDE 1 MG/ML
INJECTION, SOLUTION INTRAMUSCULAR; INTRAVENOUS; SUBCUTANEOUS
Status: DISPENSED
Start: 2025-04-09

## (undated) RX ORDER — FENTANYL CITRATE 50 UG/ML
INJECTION, SOLUTION INTRAMUSCULAR; INTRAVENOUS
Status: DISPENSED
Start: 2025-03-21

## (undated) RX ORDER — DEXAMETHASONE SODIUM PHOSPHATE 4 MG/ML
INJECTION, SOLUTION INTRA-ARTICULAR; INTRALESIONAL; INTRAMUSCULAR; INTRAVENOUS; SOFT TISSUE
Status: DISPENSED
Start: 2020-03-24

## (undated) RX ORDER — PHENYLEPHRINE HCL IN 0.9% NACL 1 MG/10 ML
SYRINGE (ML) INTRAVENOUS
Status: DISPENSED
Start: 2020-03-24

## (undated) RX ORDER — PROPOFOL 10 MG/ML
INJECTION, EMULSION INTRAVENOUS
Status: DISPENSED
Start: 2019-11-29

## (undated) RX ORDER — OXYCODONE HYDROCHLORIDE 5 MG/1
TABLET ORAL
Status: DISPENSED
Start: 2019-12-02

## (undated) RX ORDER — ONDANSETRON 2 MG/ML
INJECTION INTRAMUSCULAR; INTRAVENOUS
Status: DISPENSED
Start: 2019-12-02

## (undated) RX ORDER — FENTANYL CITRATE 50 UG/ML
INJECTION, SOLUTION INTRAMUSCULAR; INTRAVENOUS
Status: DISPENSED
Start: 2020-03-24

## (undated) RX ORDER — KETOROLAC TROMETHAMINE 15 MG/ML
INJECTION, SOLUTION INTRAMUSCULAR; INTRAVENOUS
Status: DISPENSED
Start: 2025-04-09

## (undated) RX ORDER — GLYCOPYRROLATE 0.2 MG/ML
INJECTION INTRAMUSCULAR; INTRAVENOUS
Status: DISPENSED
Start: 2019-11-29

## (undated) RX ORDER — EPHEDRINE SULFATE 50 MG/ML
INJECTION, SOLUTION INTRAMUSCULAR; INTRAVENOUS; SUBCUTANEOUS
Status: DISPENSED
Start: 2019-11-29

## (undated) RX ORDER — GLYCOPYRROLATE 0.2 MG/ML
INJECTION INTRAMUSCULAR; INTRAVENOUS
Status: DISPENSED
Start: 2020-03-24

## (undated) RX ORDER — CEFAZOLIN SODIUM 2 G/100ML
INJECTION, SOLUTION INTRAVENOUS
Status: DISPENSED
Start: 2019-12-02

## (undated) RX ORDER — FENTANYL CITRATE-0.9 % NACL/PF 10 MCG/ML
PLASTIC BAG, INJECTION (ML) INTRAVENOUS
Status: DISPENSED
Start: 2025-04-09

## (undated) RX ORDER — PHENYLEPHRINE HCL IN 0.9% NACL 1 MG/10 ML
SYRINGE (ML) INTRAVENOUS
Status: DISPENSED
Start: 2019-11-29

## (undated) RX ORDER — OXYCODONE HYDROCHLORIDE 5 MG/1
TABLET ORAL
Status: DISPENSED
Start: 2025-04-09

## (undated) RX ORDER — DEXAMETHASONE SODIUM PHOSPHATE 4 MG/ML
INJECTION, SOLUTION INTRA-ARTICULAR; INTRALESIONAL; INTRAMUSCULAR; INTRAVENOUS; SOFT TISSUE
Status: DISPENSED
Start: 2019-11-29

## (undated) RX ORDER — FENTANYL CITRATE 50 UG/ML
INJECTION, SOLUTION INTRAMUSCULAR; INTRAVENOUS
Status: DISPENSED
Start: 2019-12-02

## (undated) RX ORDER — ACETAMINOPHEN 325 MG/1
TABLET ORAL
Status: DISPENSED
Start: 2025-03-21

## (undated) RX ORDER — LIDOCAINE HYDROCHLORIDE 10 MG/ML
INJECTION, SOLUTION EPIDURAL; INFILTRATION; INTRACAUDAL; PERINEURAL
Status: DISPENSED
Start: 2019-11-29

## (undated) RX ORDER — CEFAZOLIN SODIUM/WATER 2 G/20 ML
SYRINGE (ML) INTRAVENOUS
Status: DISPENSED
Start: 2025-03-21

## (undated) RX ORDER — PHENYLEPHRINE HCL IN 0.9% NACL 1 MG/10 ML
SYRINGE (ML) INTRAVENOUS
Status: DISPENSED
Start: 2019-12-02

## (undated) RX ORDER — LIDOCAINE HYDROCHLORIDE 10 MG/ML
INJECTION, SOLUTION EPIDURAL; INFILTRATION; INTRACAUDAL; PERINEURAL
Status: DISPENSED
Start: 2020-03-24